# Patient Record
Sex: MALE | Race: BLACK OR AFRICAN AMERICAN | URBAN - METROPOLITAN AREA
[De-identification: names, ages, dates, MRNs, and addresses within clinical notes are randomized per-mention and may not be internally consistent; named-entity substitution may affect disease eponyms.]

---

## 2021-08-28 ENCOUNTER — TELEPHONE ENCOUNTER (OUTPATIENT)
Dept: URBAN - METROPOLITAN AREA CLINIC 13 | Facility: CLINIC | Age: 66
End: 2021-08-28

## 2021-08-29 ENCOUNTER — TELEPHONE ENCOUNTER (OUTPATIENT)
Dept: URBAN - METROPOLITAN AREA CLINIC 13 | Facility: CLINIC | Age: 66
End: 2021-08-29

## 2022-09-09 ENCOUNTER — HOSPITAL ENCOUNTER (INPATIENT)
Dept: HOSPITAL 5 - ED | Age: 67
LOS: 14 days | DRG: 853 | End: 2022-09-23
Attending: STUDENT IN AN ORGANIZED HEALTH CARE EDUCATION/TRAINING PROGRAM | Admitting: STUDENT IN AN ORGANIZED HEALTH CARE EDUCATION/TRAINING PROGRAM
Payer: MEDICARE

## 2022-09-09 DIAGNOSIS — G93.41: ICD-10-CM

## 2022-09-09 DIAGNOSIS — N17.9: ICD-10-CM

## 2022-09-09 DIAGNOSIS — A04.72: ICD-10-CM

## 2022-09-09 DIAGNOSIS — E11.52: ICD-10-CM

## 2022-09-09 DIAGNOSIS — A41.9: Primary | ICD-10-CM

## 2022-09-09 DIAGNOSIS — I25.10: ICD-10-CM

## 2022-09-09 DIAGNOSIS — I12.9: ICD-10-CM

## 2022-09-09 DIAGNOSIS — J96.21: ICD-10-CM

## 2022-09-09 DIAGNOSIS — D50.9: ICD-10-CM

## 2022-09-09 DIAGNOSIS — E87.6: ICD-10-CM

## 2022-09-09 DIAGNOSIS — E11.621: ICD-10-CM

## 2022-09-09 DIAGNOSIS — I96: ICD-10-CM

## 2022-09-09 DIAGNOSIS — E11.22: ICD-10-CM

## 2022-09-09 DIAGNOSIS — I46.9: ICD-10-CM

## 2022-09-09 DIAGNOSIS — L97.529: ICD-10-CM

## 2022-09-09 DIAGNOSIS — N18.9: ICD-10-CM

## 2022-09-09 DIAGNOSIS — Z20.822: ICD-10-CM

## 2022-09-09 DIAGNOSIS — M86.8X7: ICD-10-CM

## 2022-09-09 DIAGNOSIS — E44.0: ICD-10-CM

## 2022-09-09 DIAGNOSIS — D69.6: ICD-10-CM

## 2022-09-09 DIAGNOSIS — E11.65: ICD-10-CM

## 2022-09-09 PROCEDURE — 84300 ASSAY OF URINE SODIUM: CPT

## 2022-09-09 PROCEDURE — 85027 COMPLETE CBC AUTOMATED: CPT

## 2022-09-09 PROCEDURE — 99285 EMERGENCY DEPT VISIT HI MDM: CPT

## 2022-09-09 PROCEDURE — 83036 HEMOGLOBIN GLYCOSYLATED A1C: CPT

## 2022-09-09 PROCEDURE — 81001 URINALYSIS AUTO W/SCOPE: CPT

## 2022-09-09 PROCEDURE — U0003 INFECTIOUS AGENT DETECTION BY NUCLEIC ACID (DNA OR RNA); SEVERE ACUTE RESPIRATORY SYNDROME CORONAVIRUS 2 (SARS-COV-2) (CORONAVIRUS DISEASE [COVID-19]), AMPLIFIED PROBE TECHNIQUE, MAKING USE OF HIGH THROUGHPUT TECHNOLOGIES AS DESCRIBED BY CMS-2020-01-R: HCPCS

## 2022-09-09 PROCEDURE — 71045 X-RAY EXAM CHEST 1 VIEW: CPT

## 2022-09-09 PROCEDURE — 84100 ASSAY OF PHOSPHORUS: CPT

## 2022-09-09 PROCEDURE — 82607 VITAMIN B-12: CPT

## 2022-09-09 PROCEDURE — 83880 ASSAY OF NATRIURETIC PEPTIDE: CPT

## 2022-09-09 PROCEDURE — 80053 COMPREHEN METABOLIC PANEL: CPT

## 2022-09-09 PROCEDURE — C1725 CATH, TRANSLUMIN NON-LASER: HCPCS

## 2022-09-09 PROCEDURE — 94003 VENT MGMT INPAT SUBQ DAY: CPT

## 2022-09-09 PROCEDURE — 76700 US EXAM ABDOM COMPLETE: CPT

## 2022-09-09 PROCEDURE — 37225: CPT

## 2022-09-09 PROCEDURE — 96375 TX/PRO/DX INJ NEW DRUG ADDON: CPT

## 2022-09-09 PROCEDURE — 86038 ANTINUCLEAR ANTIBODIES: CPT

## 2022-09-09 PROCEDURE — 85018 HEMOGLOBIN: CPT

## 2022-09-09 PROCEDURE — C9113 INJ PANTOPRAZOLE SODIUM, VIA: HCPCS

## 2022-09-09 PROCEDURE — 85007 BL SMEAR W/DIFF WBC COUNT: CPT

## 2022-09-09 PROCEDURE — 82550 ASSAY OF CK (CPK): CPT

## 2022-09-09 PROCEDURE — 87070 CULTURE OTHR SPECIMN AEROBIC: CPT

## 2022-09-09 PROCEDURE — 87040 BLOOD CULTURE FOR BACTERIA: CPT

## 2022-09-09 PROCEDURE — A9575 INJ GADOTERATE MEGLUMI 0.1ML: HCPCS

## 2022-09-09 PROCEDURE — 87205 SMEAR GRAM STAIN: CPT

## 2022-09-09 PROCEDURE — 93306 TTE W/DOPPLER COMPLETE: CPT

## 2022-09-09 PROCEDURE — 82803 BLOOD GASES ANY COMBINATION: CPT

## 2022-09-09 PROCEDURE — 37220: CPT

## 2022-09-09 PROCEDURE — 83935 ASSAY OF URINE OSMOLALITY: CPT

## 2022-09-09 PROCEDURE — C1769 GUIDE WIRE: HCPCS

## 2022-09-09 PROCEDURE — 84156 ASSAY OF PROTEIN URINE: CPT

## 2022-09-09 PROCEDURE — 85610 PROTHROMBIN TIME: CPT

## 2022-09-09 PROCEDURE — 82140 ASSAY OF AMMONIA: CPT

## 2022-09-09 PROCEDURE — 94002 VENT MGMT INPAT INIT DAY: CPT

## 2022-09-09 PROCEDURE — 86021 WBC ANTIBODY IDENTIFICATION: CPT

## 2022-09-09 PROCEDURE — P9035 PLATELET PHERES LEUKOREDUCED: HCPCS

## 2022-09-09 PROCEDURE — 96367 TX/PROPH/DG ADDL SEQ IV INF: CPT

## 2022-09-09 PROCEDURE — 80048 BASIC METABOLIC PNL TOTAL CA: CPT

## 2022-09-09 PROCEDURE — 76937 US GUIDE VASCULAR ACCESS: CPT

## 2022-09-09 PROCEDURE — 88305 TISSUE EXAM BY PATHOLOGIST: CPT

## 2022-09-09 PROCEDURE — 80061 LIPID PANEL: CPT

## 2022-09-09 PROCEDURE — 85025 COMPLETE CBC W/AUTO DIFF WBC: CPT

## 2022-09-09 PROCEDURE — 74018 RADEX ABDOMEN 1 VIEW: CPT

## 2022-09-09 PROCEDURE — 36415 COLL VENOUS BLD VENIPUNCTURE: CPT

## 2022-09-09 PROCEDURE — 83010 ASSAY OF HAPTOGLOBIN QUANT: CPT

## 2022-09-09 PROCEDURE — 83735 ASSAY OF MAGNESIUM: CPT

## 2022-09-09 PROCEDURE — 76770 US EXAM ABDO BACK WALL COMP: CPT

## 2022-09-09 PROCEDURE — C1887 CATHETER, GUIDING: HCPCS

## 2022-09-09 PROCEDURE — 87075 CULTR BACTERIA EXCEPT BLOOD: CPT

## 2022-09-09 PROCEDURE — 86160 COMPLEMENT ANTIGEN: CPT

## 2022-09-09 PROCEDURE — 96365 THER/PROPH/DIAG IV INF INIT: CPT

## 2022-09-09 PROCEDURE — C2623 CATH, TRANSLUMIN, DRUG-COAT: HCPCS

## 2022-09-09 PROCEDURE — 86901 BLOOD TYPING SEROLOGIC RH(D): CPT

## 2022-09-09 PROCEDURE — C8929 TTE W OR WO FOL WCON,DOPPLER: HCPCS

## 2022-09-09 PROCEDURE — 96366 THER/PROPH/DIAG IV INF ADDON: CPT

## 2022-09-09 PROCEDURE — 36600 WITHDRAWAL OF ARTERIAL BLOOD: CPT

## 2022-09-09 PROCEDURE — 82570 ASSAY OF URINE CREATININE: CPT

## 2022-09-09 PROCEDURE — 83550 IRON BINDING TEST: CPT

## 2022-09-09 PROCEDURE — 82106 ALPHA-FETOPROTEIN AMNIOTIC: CPT

## 2022-09-09 PROCEDURE — 84133 ASSAY OF URINE POTASSIUM: CPT

## 2022-09-09 PROCEDURE — 88311 DECALCIFY TISSUE: CPT

## 2022-09-09 PROCEDURE — 70450 CT HEAD/BRAIN W/O DYE: CPT

## 2022-09-09 PROCEDURE — 86022 PLATELET ANTIBODIES: CPT

## 2022-09-09 PROCEDURE — 86850 RBC ANTIBODY SCREEN: CPT

## 2022-09-09 PROCEDURE — C1760 CLOSURE DEV, VASC: HCPCS

## 2022-09-09 PROCEDURE — 82728 ASSAY OF FERRITIN: CPT

## 2022-09-09 PROCEDURE — 84165 PROTEIN E-PHORESIS SERUM: CPT

## 2022-09-09 PROCEDURE — 87116 MYCOBACTERIA CULTURE: CPT

## 2022-09-09 PROCEDURE — 85384 FIBRINOGEN ACTIVITY: CPT

## 2022-09-09 PROCEDURE — 82962 GLUCOSE BLOOD TEST: CPT

## 2022-09-09 PROCEDURE — 85014 HEMATOCRIT: CPT

## 2022-09-09 PROCEDURE — C1714 CATH, TRANS ATHERECTOMY, DIR: HCPCS

## 2022-09-09 PROCEDURE — 86900 BLOOD TYPING SEROLOGIC ABO: CPT

## 2022-09-09 PROCEDURE — 94760 N-INVAS EAR/PLS OXIMETRY 1: CPT

## 2022-09-09 PROCEDURE — 86920 COMPATIBILITY TEST SPIN: CPT

## 2022-09-09 PROCEDURE — C1884 EMBOLIZATION PROTECT SYST: HCPCS

## 2022-09-09 PROCEDURE — 90732 PPSV23 VACC 2 YRS+ SUBQ/IM: CPT

## 2022-09-09 PROCEDURE — 94660 CPAP INITIATION&MGMT: CPT

## 2022-09-09 PROCEDURE — P9016 RBC LEUKOCYTES REDUCED: HCPCS

## 2022-09-10 LAB
%HYPO/RBC NFR BLD AUTO: (no result) %
ALBUMIN SERPL-MCNC: 3.6 G/DL (ref 3.9–5)
ALT SERPL-CCNC: 28 UNITS/L (ref 7–56)
ANISOCYTOSIS BLD QL SMEAR: (no result)
BAND NEUTROPHILS # (MANUAL): 0.5 K/MM3
BUN SERPL-MCNC: 57 MG/DL (ref 9–20)
BUN/CREAT SERPL: 34 %
CALCIUM SERPL-MCNC: 8.9 MG/DL (ref 8.4–10.2)
HCT VFR BLD CALC: 25.5 % (ref 35.5–45.6)
HDLC SERPL-MCNC: 37 MG/DL (ref 40–59)
HEMOLYSIS INDEX: 17
HGB BLD-MCNC: 8.2 GM/DL (ref 11.8–15.2)
MACROCYTES BLD QL SMEAR: (no result)
MCHC RBC AUTO-ENTMCNC: 32 % (ref 32–34)
MCV RBC AUTO: 101 FL (ref 84–94)
MYELOCYTES # (MANUAL): 0 K/MM3
PLATELET # BLD: 31 K/MM3 (ref 140–440)
PROMYELOCYTES # (MANUAL): 0 K/MM3
RBC # BLD AUTO: 2.52 M/MM3 (ref 3.65–5.03)
TOTAL CELLS COUNTED BLD: 100

## 2022-09-10 RX ADMIN — Medication SCH ML: at 11:12

## 2022-09-10 RX ADMIN — POVIDONE-IODINE SCH APPLIC: 100 OINTMENT TOPICAL at 16:27

## 2022-09-10 RX ADMIN — HEPARIN SODIUM SCH UNIT: 5000 INJECTION, SOLUTION INTRAVENOUS; SUBCUTANEOUS at 21:44

## 2022-09-10 RX ADMIN — INSULIN HUMAN SCH UNITS: 100 INJECTION, SOLUTION PARENTERAL at 21:46

## 2022-09-10 RX ADMIN — LINEZOLID SCH MLS/HR: 600 INJECTION, SOLUTION INTRAVENOUS at 16:27

## 2022-09-10 RX ADMIN — OXYCODONE AND ACETAMINOPHEN PRN TAB: 5; 325 TABLET ORAL at 15:36

## 2022-09-10 RX ADMIN — CEFEPIME SCH MLS/HR: 1 INJECTION, POWDER, FOR SOLUTION INTRAMUSCULAR; INTRAVENOUS at 16:27

## 2022-09-10 RX ADMIN — SODIUM CHLORIDE SCH MLS/HR: 0.9 INJECTION, SOLUTION INTRAVENOUS at 15:10

## 2022-09-10 RX ADMIN — INSULIN HUMAN SCH UNITS: 100 INJECTION, SOLUTION PARENTERAL at 21:44

## 2022-09-10 RX ADMIN — HEPARIN SODIUM SCH UNIT: 5000 INJECTION, SOLUTION INTRAVENOUS; SUBCUTANEOUS at 15:10

## 2022-09-10 RX ADMIN — CEFEPIME SCH MLS/HR: 1 INJECTION, POWDER, FOR SOLUTION INTRAMUSCULAR; INTRAVENOUS at 09:10

## 2022-09-10 RX ADMIN — Medication SCH ML: at 21:47

## 2022-09-10 RX ADMIN — INSULIN HUMAN SCH UNITS: 100 INJECTION, SOLUTION PARENTERAL at 09:57

## 2022-09-10 RX ADMIN — INSULIN HUMAN SCH UNITS: 100 INJECTION, SOLUTION PARENTERAL at 16:24

## 2022-09-10 NOTE — XRAY REPORT
LEFT FOOT 3 VIEWS.



INDICATION / CLINICAL INFORMATION: r/o osteo 



COMPARISON: None available.

 

FINDINGS:

Small bubbles of gas are present within the soft tissues of the forefoot adjacent the third through f
ifth toes. Osteolysis of the second toe present. The third toe demonstrates overlying small bubbles o
f gas versus osteopenia and possible changes of early acute osteomyelitis. Moderate hallux valgus def
ormity present.



IMPRESSION:

1. Soft tissue gas suggestive of gas producing organism and infection as detailed.

2. Early acute osteomyelitis involving third toe not excluded. MRI recommended for further evaluation
.



Signer Name: Alessandro Chung II, MD 

Signed: 9/10/2022 2:43 AM

Workstation Name: [id number]

## 2022-09-10 NOTE — CONSULTATION
History of Present Illness





- Reason for Consult


Consult date: 09/10/22


3rd toe infection


Requesting physician: NORTH GALLEGOS





- History of Present Illness


67-year-old male with a past medical history of diabetes, CKD, prior bypass of 

the RLE 10+ years ago, stenting of the LLE from outside facility many years ago 

who presents to the emergency room with worsening left foot infection.  The 

patient states he has had an ulcer of the third toe of his left foot for the 

last 1 month.  He regularly sees a podiatrist who referred him to vascular 

surgery.  The patient has undergone vascular work-up including ultrasound and 

diagnostic angiogram by myself.  He was being scheduled for a revascularization 

procedure.  





The patient was also started on doxycycline and instructed to apply Betadine to 

the wound daily for wound care.  The patient states that over the last 2 to 3 

days his ulcer became rapidly worse and became infected.  He is noted a stronger

odor from the area and more drainage.  There is mild swelling of his foot.  In 

the emergency room work-up included lab work and a x-ray of the left foot.  

Patient was noted to have a leukocytosis greater than 20 and blood sugar greater

than 500.  X-ray revealed possible osteomyelitis.





Vascular consulted for evaluation.  Patient has nonpalpable pedal pulses with a 

open left third digit PIP joint with subluxation of the joint and soft tissue 

necrosis of the distal and mid phalanx.  The toe has a foul odor.  Betadine 

applied.





Discussed with patient that plan will be for revascularization on Monday, and 

third digit amputation on Tuesday.  May benefit from hyperbaric oxygen as 

outpatient.





Contacted infectious disease to substitute vancomycin for linezolid to avoid JASMEET

on CKD.  Dr. Pineda agreed with plan.





Past History


Past Medical History: diabetes, PVD (s/p RLE bypass 10+ years ago, occluded 

(pop-dp) ; s/p LLE SFA/pop stenting at outside facility years ago), renal 

failure (CKD)


Past Surgical History: Other (PVD surgery)


Social history: no significant social history


Family history: no significant family history





Medications and Allergies


                                    Allergies











Allergy/AdvReac Type Severity Reaction Status Date / Time


 


No Known Allergies Allergy   Verified 09/10/22 05:35











Active Meds: 


Active Medications





Acetaminophen (Acetaminophen 325 Mg Tab)  650 mg PO Q4H PRN


   PRN Reason: Pain MILD(1-3)/Fever >100.5/HA


Dextrose (Dextrose 50% In Water (25gm) 50 Ml Syringe)  50 ml IV Q30MIN PRN; 

Protocol


   PRN Reason: Hypoglycemia


Heparin Sodium (Porcine) (Heparin 5,000 Unit/1 Ml Vial)  5,000 unit SUB-Q Q8HR 

ELVIE


Sodium Chloride (Nacl 0.9% 1000 Ml)  1,000 mls @ 100 mls/hr IV AS DIRECT ELVIE


Cefepime HCl (Cefepime/Ns 1 Gm/100 Ml)  1 gm in 100 mls @ 200 mls/hr IV Q8H ELVIE;

Protocol


   Last Admin: 09/10/22 09:10 Dose:  200 mls/hr


   


Lactated Ringer's (Lactated Ringers)  1,000 mls @ 999 mls/hr IV BOLUS ONE


   Stop: 09/10/22 14:12


Insulin Human Isoph/Insulin Regular (Insulin Nph/Regular 70/30 Inj)  15 unit 

SUB-Q BIDDIAB ELVIE


Insulin Human Regular (Insulin Regular, Human 100 Units/1 Ml)  0 units SUB-Q Q6H

ELVIE; Protocol


   Last Admin: 09/10/22 09:57 Dose:  8 units


   


Morphine Sulfate (Morphine 4 Mg/1 Ml Inj)  2 mg IV Q4H PRN


   PRN Reason: Pain , Severe (7-10)


Ondansetron HCl (Ondansetron 4 Mg/2 Ml Inj)  4 mg IV Q8H PRN


   PRN Reason: Nausea And Vomiting


Oxycodone/Acetaminophen (Oxycodone /Acetaminophen 5-325mg Tab)  1 tab PO Q6H PRN


   PRN Reason: Pain, Moderate (4-6)


Sodium Chloride (Sodium Chloride 0.9% 10 Ml Flush Syringe)  10 ml IV BID [facility]


   Last Admin: 09/10/22 11:12 Dose:  10 ml


   


Sodium Chloride (Sodium Chloride 0.9% 10 Ml Flush Syringe)  10 ml IV PRN PRN


   PRN Reason: LINE FLUSH











Review of Systems


All systems: negative (see HPI)





Exam





- Constitutional


Vitals: 


                                        











Temp Pulse Resp BP Pulse Ox


 


 99.0 F   109 H  16   124/49   99 


 


 09/09/22 21:14  09/09/22 21:14  09/09/22 21:14  09/09/22 21:14  09/09/22 21:14











General appearance: Present: no acute distress





- EENT


Eyes: Present: EOM intact


ENT: hearing intact





- Neck


Neck: Present: supple





- Respiratory


Respiratory effort: normal





- Extremities


Extremities: normal temperature, normal color


Extremity abnormal: pulses diminished, other (Left third digit as described in 

HPI)





- Abdominal


General gastrointestinal: Present: soft, non-tender





- Psychiatric


Psychiatric: appropriate mood/affect, cooperative





Results





- Labs


CBC & Chem 7: 


                                 09/10/22 02:26





                                 09/10/22 02:26


Labs: 


                              Abnormal lab results











  09/10/22 09/10/22 09/10/22 Range/Units





  02:26 02:26 08:41 


 


WBC  27.0 H    (4.5-11.0)  K/mm3


 


RBC  2.52 L    (3.65-5.03)  M/mm3


 


Hgb  8.2 L    (11.8-15.2)  gm/dl


 


Hct  25.5 L    (35.5-45.6)  %


 


MCV  101 H    (84-94)  fl


 


MCH  33 H    (28-32)  pg


 


RDW  13.1 L    (13.2-15.2)  %


 


Plt Count  31 L    (140-440)  K/mm3


 


Seg Neuts % (Manual)  88.0 H    (40.0-70.0)  %


 


Lymphocytes % (Manual)  7.0 L    (13.4-35.0)  %


 


Seg Neutrophils # Man  23.8 H    (1.8-7.7)  K/mm3


 


Sodium   129 L   (137-145)  mmol/L


 


Chloride   91.5 L   ()  mmol/L


 


Carbon Dioxide   16 L   (22-30)  mmol/L


 


BUN   57 H   (9-20)  mg/dL


 


Creatinine   1.7 H   (0.8-1.3)  mg/dL


 


Glucose   509 H*   ()  mg/dL


 


POC Glucose     ()  mg/dL


 


Hemoglobin A1c    6.5 H  (4-6)  %


 


AST   41 H   (5-40)  units/L


 


Albumin   3.6 L   (3.9-5)  g/dL


 


LDL Cholesterol Direct     ()  mg/dL


 


HDL Cholesterol     (40-59)  mg/dL














  09/10/22 09/10/22 09/10/22 Range/Units





  08:41 09:19 11:22 


 


WBC     (4.5-11.0)  K/mm3


 


RBC     (3.65-5.03)  M/mm3


 


Hgb     (11.8-15.2)  gm/dl


 


Hct     (35.5-45.6)  %


 


MCV     (84-94)  fl


 


MCH     (28-32)  pg


 


RDW     (13.2-15.2)  %


 


Plt Count     (140-440)  K/mm3


 


Seg Neuts % (Manual)     (40.0-70.0)  %


 


Lymphocytes % (Manual)     (13.4-35.0)  %


 


Seg Neutrophils # Man     (1.8-7.7)  K/mm3


 


Sodium     (137-145)  mmol/L


 


Chloride     ()  mmol/L


 


Carbon Dioxide     (22-30)  mmol/L


 


BUN     (9-20)  mg/dL


 


Creatinine     (0.8-1.3)  mg/dL


 


Glucose     ()  mg/dL


 


POC Glucose   394 H  343 H  ()  mg/dL


 


Hemoglobin A1c     (4-6)  %


 


AST     (5-40)  units/L


 


Albumin     (3.9-5)  g/dL


 


LDL Cholesterol Direct  46 L    ()  mg/dL


 


HDL Cholesterol  37 L    (40-59)  mg/dL














Assessment and Plan





67-year-old male with past medical history of diabetes, PVD, right TMA, left thi

rd digit ulceration who presents with wet gangrene of the left third digit 

distal phalanx and mid phalanx.  There is an open left PIP.





There is a septic joint left third digit PIP with distal and mid phalanx 

necrosis and foul odor compatible with wet gangrene.





Patient has nonpalpable pedal pulses.





Plan for revascularization on Monday.  Plan for amputation on Tuesday.





Contact infectious disease for antibiotic management and changing vancomycin to 

linezolid.  Appreciate assistance.





Continue aspirin, cilostazol, and will start Plavix.





N.p.o. after midnight on Monday for revascularization.





Recommend continued IV hydration in preparation for procedure.

## 2022-09-10 NOTE — CONSULTATION
History of Present Illness


Consult date: 09/10/22


Reason for consult: wound care


Chief complaint: 





Left third toe open wound





- History of present illness


History of present illness: 





67-year-old male with a past medical history of diabetes who presents to the 

emergency room with worsening left foot infection.  The patient states he has 

had an infection of the third toe of his left foot for the last 1 month.  He 

regularly sees a podiatrist who referred him to vascular surgery.  The patient 

has undergone vascular work-up including ultrasound and diagnostic angiogram 

with Dr. Wells.  He was being scheduled for a revascularization procedure.  The 

patient was also started on doxycycline and instructed to apply Betadine to the 

wound daily for wound care.  The patient states that over the last 2 to 3 days 

his infection has gotten worse.  He is noted a stronger odor from the area and 

more drainage.  There is mild swelling of his foot.  In the emergency room work-

up included lab work and a x-ray of the left foot.  Patient was noted to have a 

leukocytosis greater than 20 and blood sugar greater than 500.  X-ray revealed 

possible osteomyelitis.





Past History


Past Medical History: diabetes, hypertension


Past Surgical History: Other (Right lower extremity arterial bypass, right 

transmetatarsal amputation)


Social history: no significant social history


Family history: no significant family history





Medications and Allergies


                                    Allergies











Allergy/AdvReac Type Severity Reaction Status Date / Time


 


No Known Allergies Allergy   Verified 09/10/22 05:35











Active Meds: 


Active Medications





Acetaminophen (Acetaminophen 325 Mg Tab)  650 mg PO Q4H PRN


   PRN Reason: Pain MILD(1-3)/Fever >100.5/HA


Dextrose (Dextrose 50% In Water (25gm) 50 Ml Syringe)  50 ml IV Q30MIN PRN; Pr

otocol


   PRN Reason: Hypoglycemia


Heparin Sodium (Porcine) (Heparin 5,000 Unit/1 Ml Vial)  5,000 unit SUB-Q Q8HR 

ELVIE


Sodium Chloride (Nacl 0.9% 1000 Ml)  1,000 mls @ 100 mls/hr IV AS DIRECT [facility]


Cefepime HCl (Cefepime/Ns 1 Gm/100 Ml)  1 gm in 100 mls @ 200 mls/hr IV Q8H EL[facility];

Protocol


   Last Admin: 09/10/22 09:10 Dose:  200 mls/hr


   


Insulin Human Isoph/Insulin Regular (Insulin Nph/Regular 70/30 Inj)  15 unit 

SUB-Q BIDDIAB ELVIE


Insulin Human Regular (Insulin Regular, Human 100 Units/1 Ml)  0 units SUB-Q Q6H

[facility]; Protocol


   Last Admin: 09/10/22 09:57 Dose:  8 units


   


Morphine Sulfate (Morphine 4 Mg/1 Ml Inj)  2 mg IV Q4H PRN


   PRN Reason: Pain , Severe (7-10)


Ondansetron HCl (Ondansetron 4 Mg/2 Ml Inj)  4 mg IV Q8H PRN


   PRN Reason: Nausea And Vomiting


Oxycodone/Acetaminophen (Oxycodone /Acetaminophen 5-325mg Tab)  1 tab PO Q6H PRN


   PRN Reason: Pain, Moderate (4-6)


Sodium Chloride (Sodium Chloride 0.9% 10 Ml Flush Syringe)  10 ml IV BID [facility]


   Last Admin: 09/10/22 11:12 Dose:  10 ml


   


Sodium Chloride (Sodium Chloride 0.9% 10 Ml Flush Syringe)  10 ml IV PRN PRN


   PRN Reason: LINE FLUSH











Review of Systems


All systems: negative (10 point ROS performed and negative except for that 

listed in HPI)





Exam


                                   Vital Signs











Temp Pulse Resp BP Pulse Ox


 


 99.0 F   109 H  16   124/49   99 


 


 09/09/22 21:14  09/09/22 21:14  09/09/22 21:14  09/09/22 21:14  09/09/22 21:14











Narrative exam: 





Gen.: Awake, alert, oriented x3.  No apparent distress


ENT: Trachea midline.  No lymphadenopathy.  No scleral icterus or conjunctival 

pallor


CV: S1, S2 present


Respiratory: No audible wheezes


Abdomen: Soft, nondistended, nontender.  No rebound, rigidity, guarding


Extremities: Previous right TMA.  Left foot is warm.  There is mild edema and 

erythema of the distal forefoot.  Distal pulses are not palpable.  There is an 

open wound of the third toe with necrotic tissue present and bone exposed.  The 

wound was cleansed and Betadine applied.  The wound was wrapped with 4 x 4 gauze

and secured with a Kerlix.





Results





- Labs





                                 09/10/22 02:26





                                 09/10/22 02:26


                              Abnormal lab results











  09/10/22 09/10/22 09/10/22 Range/Units





  02:26 02:26 08:41 


 


WBC  27.0 H    (4.5-11.0)  K/mm3


 


RBC  2.52 L    (3.65-5.03)  M/mm3


 


Hgb  8.2 L    (11.8-15.2)  gm/dl


 


Hct  25.5 L    (35.5-45.6)  %


 


MCV  101 H    (84-94)  fl


 


MCH  33 H    (28-32)  pg


 


RDW  13.1 L    (13.2-15.2)  %


 


Plt Count  31 L    (140-440)  K/mm3


 


Seg Neuts % (Manual)  88.0 H    (40.0-70.0)  %


 


Lymphocytes % (Manual)  7.0 L    (13.4-35.0)  %


 


Seg Neutrophils # Man  23.8 H    (1.8-7.7)  K/mm3


 


Sodium   129 L   (137-145)  mmol/L


 


Chloride   91.5 L   ()  mmol/L


 


Carbon Dioxide   16 L   (22-30)  mmol/L


 


BUN   57 H   (9-20)  mg/dL


 


Creatinine   1.7 H   (0.8-1.3)  mg/dL


 


Glucose   509 H*   ()  mg/dL


 


POC Glucose     ()  mg/dL


 


Hemoglobin A1c    6.5 H  (4-6)  %


 


AST   41 H   (5-40)  units/L


 


Albumin   3.6 L   (3.9-5)  g/dL


 


LDL Cholesterol Direct     ()  mg/dL


 


HDL Cholesterol     (40-59)  mg/dL














  09/10/22 09/10/22 09/10/22 Range/Units





  08:41 09:19 11:22 


 


WBC     (4.5-11.0)  K/mm3


 


RBC     (3.65-5.03)  M/mm3


 


Hgb     (11.8-15.2)  gm/dl


 


Hct     (35.5-45.6)  %


 


MCV     (84-94)  fl


 


MCH     (28-32)  pg


 


RDW     (13.2-15.2)  %


 


Plt Count     (140-440)  K/mm3


 


Seg Neuts % (Manual)     (40.0-70.0)  %


 


Lymphocytes % (Manual)     (13.4-35.0)  %


 


Seg Neutrophils # Man     (1.8-7.7)  K/mm3


 


Sodium     (137-145)  mmol/L


 


Chloride     ()  mmol/L


 


Carbon Dioxide     (22-30)  mmol/L


 


BUN     (9-20)  mg/dL


 


Creatinine     (0.8-1.3)  mg/dL


 


Glucose     ()  mg/dL


 


POC Glucose   394 H  343 H  ()  mg/dL


 


Hemoglobin A1c     (4-6)  %


 


AST     (5-40)  units/L


 


Albumin     (3.9-5)  g/dL


 


LDL Cholesterol Direct  46 L    ()  mg/dL


 


HDL Cholesterol  37 L    (40-59)  mg/dL








                                 Diabetes panel











  09/10/22 09/10/22 09/10/22 Range/Units





  02:26 08:41 08:41 


 


Sodium  129 L    (137-145)  mmol/L


 


Potassium  4.8    (3.6-5.0)  mmol/L


 


Chloride  91.5 L    ()  mmol/L


 


Carbon Dioxide  16 L    (22-30)  mmol/L


 


BUN  57 H    (9-20)  mg/dL


 


Creatinine  1.7 H    (0.8-1.3)  mg/dL


 


Glucose  509 H*    ()  mg/dL


 


Hemoglobin A1c   6.5 H   (4-6)  %


 


Calcium  8.9    (8.4-10.2)  mg/dL


 


AST  41 H    (5-40)  units/L


 


ALT  28    (7-56)  units/L


 


Alkaline Phosphatase  129    ()  units/L


 


Total Protein  6.9    (6.3-8.2)  g/dL


 


Albumin  3.6 L    (3.9-5)  g/dL


 


Triglycerides    114  (2-149)  mg/dL


 


HDL Cholesterol    37 L  (40-59)  mg/dL








                                  Calcium panel











  09/10/22 Range/Units





  02:26 


 


Calcium  8.9  (8.4-10.2)  mg/dL


 


Albumin  3.6 L  (3.9-5)  g/dL








                                 Pituitary panel











  09/10/22 Range/Units





  02:26 


 


Sodium  129 L  (137-145)  mmol/L


 


Potassium  4.8  (3.6-5.0)  mmol/L


 


Chloride  91.5 L  ()  mmol/L


 


Carbon Dioxide  16 L  (22-30)  mmol/L


 


BUN  57 H  (9-20)  mg/dL


 


Creatinine  1.7 H  (0.8-1.3)  mg/dL


 


Glucose  509 H*  ()  mg/dL


 


Calcium  8.9  (8.4-10.2)  mg/dL








                                  Adrenal panel











  09/10/22 Range/Units





  02:26 


 


Sodium  129 L  (137-145)  mmol/L


 


Potassium  4.8  (3.6-5.0)  mmol/L


 


Chloride  91.5 L  ()  mmol/L


 


Carbon Dioxide  16 L  (22-30)  mmol/L


 


BUN  57 H  (9-20)  mg/dL


 


Creatinine  1.7 H  (0.8-1.3)  mg/dL


 


Glucose  509 H*  ()  mg/dL


 


Calcium  8.9  (8.4-10.2)  mg/dL


 


Total Bilirubin  0.70  (0.1-1.2)  mg/dL


 


AST  41 H  (5-40)  units/L


 


ALT  28  (7-56)  units/L


 


Alkaline Phosphatase  129  ()  units/L


 


Total Protein  6.9  (6.3-8.2)  g/dL


 


Albumin  3.6 L  (3.9-5)  g/dL














- Imaging


Additional studies: 





X-ray left foot





Assessment and Plan





67-year-old male with





1.  Infected diabetic foot wound left third toe


2.  Osteomyelitis


3.  Peripheral arterial disease





Plan:


1. Consistent carb diet


2. strict glucose control


3. gentle IVF


4. IV abx


5. Daily wound care


6. Vascular surgery consulted - d/w Dr. Wells. Pt to be scheduled for revasc on 

Monday


7. Obtain MRI L foot 


8.  Patient at the very least will need an amputation of his left third toe.  We

will await results from revascularization procedure and MRI left foot to 

determine extent of amputation.


9.  Patient will be a candidate for hyperbaric oxygen therapy as an outpatient. 

Will obtain chest x-ray as Pre-HBOT workup





Plan was discussed in detail with the patient and he acknowledged understanding.

 All questions answered.


Thank you for this consultation. Please call with any questions or concerns.

## 2022-09-10 NOTE — HISTORY AND PHYSICAL REPORT
History of Present Illness


Date of examination: 09/10/22


Date of admission: 


09/10/22 08:30





Chief complaint: 





Infected left diabetic foot





Past History


Past Medical History: diabetes, PVD (s/p RLE bypass 10+ years ago, occluded 

(pop-dp) ; s/p LLE SFA/pop stenting at outside facility years ago), renal 

failure (CKD)


Past Surgical History: Other (PVD surgery; transmetatarsal amputation of right 

foot)


Social history: no significant social history


Family history: no significant family history





Medications and Allergies


                                    Allergies











Allergy/AdvReac Type Severity Reaction Status Date / Time


 


No Known Allergies Allergy   Verified 09/10/22 05:35











Active Meds: 


Active Medications





Acetaminophen (Acetaminophen 325 Mg Tab)  650 mg PO Q4H PRN


   PRN Reason: Pain MILD(1-3)/Fever >100.5/HA


Dextrose (Dextrose 50% In Water (25gm) 50 Ml Syringe)  50 ml IV Q30MIN PRN; 

Protocol


   PRN Reason: Hypoglycemia


Heparin Sodium (Porcine) (Heparin 5,000 Unit/1 Ml Vial)  5,000 unit SUB-Q Q8HR 

ELVIE


   Last Admin: 09/10/22 15:10 Dose:  5,000 unit


   


Sodium Chloride (Nacl 0.9% 1000 Ml)  1,000 mls @ 100 mls/hr IV AS DIRECT ELVIE


   Last Admin: 09/10/22 15:10 Dose:  100 mls/hr


   


Cefepime HCl (Cefepime/Ns 1 Gm/100 Ml)  1 gm in 100 mls @ 200 mls/hr IV Q8H ELVIE;

Protocol


   Last Admin: 09/10/22 16:27 Dose:  200 mls/hr


   


Linezolid (Zyvox 600mg/300ml)  600 mg in 300 mls @ 300 mls/hr IV Q12H ELVIE; 

Protocol


   Last Admin: 09/10/22 16:27 Dose:  300 mls/hr


   


Insulin Human Isoph/Insulin Regular (Insulin Nph/Regular 70/30 Inj)  25 unit 

SUB-Q BIDDIAB ELVIE


Insulin Human Regular (Insulin Regular, Human 100 Units/1 Ml)  0 units SUB-Q Q6H

ELVIE; Protocol


   Last Admin: 09/10/22 16:24 Dose:  8 units


   


Insulin Human Regular (Insulin Regular, Human 100 Units/1 Ml)  10 units SUB-Q 

ONCE PRN


   PRN Reason: Hyperglycemia


   Last Admin: 09/10/22 18:29 Dose:  10 units


   


Insulin Human Regular (Insulin Regular, Human 100 Units/1 Ml)  5 units SUB-Q 

[facility]


Morphine Sulfate (Morphine 4 Mg/1 Ml Inj)  2 mg IV Q4H PRN


   PRN Reason: Pain , Severe (7-10)


Ondansetron HCl (Ondansetron 4 Mg/2 Ml Inj)  4 mg IV Q8H PRN


   PRN Reason: Nausea And Vomiting


Oxycodone/Acetaminophen (Oxycodone /Acetaminophen 5-325mg Tab)  1 tab PO Q6H PRN


   PRN Reason: Pain, Moderate (4-6)


   Last Admin: 09/10/22 15:36 Dose:  1 tab


   


Pneumococcal Polyvalent Vaccine (Pneumococcal 23 Valent 0.5 Ml Vial)  0.5 ml IM 

.ONCE ONE


   Stop: 09/11/22 12:01


Povidone Iodine (Povidone-Iodine Ointment 28.35 Gm)  1 applic TP DAILY [facility]


   Last Admin: 09/10/22 16:27 Dose:  1 applic


   


Sodium Chloride (Sodium Chloride 0.9% 10 Ml Flush Syringe)  10 ml IV BID [facility]


   Last Admin: 09/10/22 11:12 Dose:  10 ml


   


Sodium Chloride (Sodium Chloride 0.9% 10 Ml Flush Syringe)  10 ml IV PRN PRN


   PRN Reason: LINE FLUSH











Review of Systems


All systems: negative


Constitutional: fever, chills, malaise


Gastrointestinal: nausea, diarrhea





Exam





- Constitutional


Vitals: 


                                        











Temp Pulse Resp BP Pulse Ox


 


 97.8 F   109 H  18   141/61   99 


 


 09/10/22 14:16  09/10/22 14:16  09/10/22 14:16  09/10/22 14:16  09/09/22 21:14











General appearance: Present: no acute distress, well-nourished, malodorous 

(Malodorous wet gangrene of left foot with ulceration of third digit)





- EENT


Eyes: Present: PERRL, EOM intact


ENT: hearing intact, clear oral mucosa, dentition normal





- Neck


Neck: Present: supple, normal ROM





- Respiratory


Respiratory effort: normal


Respiratory: bilateral: CTA





- Cardiovascular


Rhythm: regular


Heart Sounds: Present: S1 & S2





- Extremities


Extremities: no ischemia, pulses symmetrical, No edema, normal temperature, 

abnormal (Transmetatarsal amputation of right foot)


Extremity abnormal: pulses diminished (Nonpalpable pedal pulses)


Peripheral Pulses: within normal limits





- Abdominal


General gastrointestinal: Present: soft, non-tender, non-distended, normal bowel

sounds


Male genitourinary: Present: deferred





- Rectal


Rectal Exam: deferred





- Integumentary


Integumentary: Present: clear, warm, dry





- Musculoskeletal


Musculoskeletal: strength equal bilaterally





- Psychiatric


Psychiatric: appropriate mood/affect, cooperative





- Neurologic


Neurologic: CNII-XII intact, moves all extremities





- Allied Health


Allied health notes reviewed: nursing





Results





- Labs


CBC & Chem 7: 


                                 09/10/22 02:26





                                 09/10/22 02:26


Labs: 


                             Laboratory Last Values











WBC  27.0 K/mm3 (4.5-11.0)  H  09/10/22  02:26    


 


RBC  2.52 M/mm3 (3.65-5.03)  L  09/10/22  02:26    


 


Hgb  8.2 gm/dl (11.8-15.2)  L  09/10/22  02:26    


 


Hct  25.5 % (35.5-45.6)  L  09/10/22  02:26    


 


MCV  101 fl (84-94)  H  09/10/22  02:26    


 


MCH  33 pg (28-32)  H  09/10/22  02:26    


 


MCHC  32 % (32-34)   09/10/22  02:26    


 


RDW  13.1 % (13.2-15.2)  L  09/10/22  02:26    


 


Plt Count  31 K/mm3 (140-440)  L  09/10/22  02:26    


 


Add Manual Diff  Complete   09/10/22  02:26    


 


Total Counted  100   09/10/22  02:26    


 


Seg Neuts % (Manual)  88.0 % (40.0-70.0)  H  09/10/22  02:26    


 


Band Neutrophils %  2.0 %  09/10/22  02:26    


 


Lymphocytes % (Manual)  7.0 % (13.4-35.0)  L  09/10/22  02:26    


 


Reactive Lymphs % (Man)  0 %  09/10/22  02:26    


 


Monocytes % (Manual)  3.0 % (0.0-7.3)   09/10/22  02:26    


 


Eosinophils % (Manual)  0 % (0.0-4.3)   09/10/22  02:26    


 


Basophils % (Manual)  0 % (0.0-1.8)   09/10/22  02:26    


 


Metamyelocytes %  0 %  09/10/22  02:26    


 


Myelocytes %  0 %  09/10/22  02:26    


 


Promyelocytes %  0 %  09/10/22  02:26    


 


Blast Cells %  0 %  09/10/22  02:26    


 


Nucleated RBC %  Not Reportable   09/10/22  02:26    


 


Seg Neutrophils # Man  23.8 K/mm3 (1.8-7.7)  H  09/10/22  02:26    


 


Band Neutrophils #  0.5 K/mm3  09/10/22  02:26    


 


Lymphocytes # (Manual)  1.9 K/mm3 (1.2-5.4)   09/10/22  02:26    


 


Abs React Lymphs (Man)  0.0 K/mm3  09/10/22  02:26    


 


Monocytes # (Manual)  0.8 K/mm3 (0.0-0.8)   09/10/22  02:26    


 


Eosinophils # (Manual)  0.0 K/mm3 (0.0-0.4)   09/10/22  02:26    


 


Basophils # (Manual)  0.0 K/mm3 (0.0-0.1)   09/10/22  02:26    


 


Metamyelocytes #  0.0 K/mm3  09/10/22  02:26    


 


Myelocytes #  0.0 K/mm3  09/10/22  02:26    


 


Promyelocytes #  0.0 K/mm3  09/10/22  02:26    


 


Blast Cells #  0.0 K/mm3  09/10/22  02:26    


 


WBC Morphology  Not Reportable   09/10/22  02:26    


 


Hypersegmented Neuts  Not Reportable   09/10/22  02:26    


 


Hyposegmented Neuts  Not Reportable   09/10/22  02:26    


 


Hypogranular Neuts  Not Reportable   09/10/22  02:26    


 


Smudge Cells  Not Reportable   09/10/22  02:26    


 


Toxic Granulation  Not Reportable   09/10/22  02:26    


 


Toxic Vacuolation  Not Reportable   09/10/22  02:26    


 


Dohle Bodies  Not Reportable   09/10/22  02:26    


 


Pelger-Huet Anomaly  Not Reportable   09/10/22  02:26    


 


Olive Rods  Not Reportable   09/10/22  02:26    


 


Platelet Estimate  Appears decreased   09/10/22  02:26    


 


Clumped Platelets  Few   09/10/22  02:26    


 


Plt Clumps, EDTA  Not Reportable   09/10/22  02:26    


 


Large Platelets  Not Reportable   09/10/22  02:26    


 


Giant Platelets  Not Reportable   09/10/22  02:26    


 


Platelet Satelliting  Not Reportable   09/10/22  02:26    


 


Plt Morphology Comment  Not Reportable   09/10/22  02:26    


 


RBC Morphology  Not Reportable   09/10/22  02:26    


 


Dimorphic RBCs  Not Reportable   09/10/22  02:26    


 


Polychromasia  Not Reportable   09/10/22  02:26    


 


Hypochromasia  2+   09/10/22  02:26    


 


Poikilocytosis  Not Reportable   09/10/22  02:26    


 


Anisocytosis  1+   09/10/22  02:26    


 


Microcytosis  Not Reportable   09/10/22  02:26    


 


Macrocytosis  1+   09/10/22  02:26    


 


Spherocytes  Not Reportable   09/10/22  02:26    


 


Pappenheimer Bodies  Not Reportable   09/10/22  02:26    


 


Sickle Cells  Not Reportable   09/10/22  02:26    


 


Target Cells  Not Reportable   09/10/22  02:26    


 


Tear Drop Cells  Not Reportable   09/10/22  02:26    


 


Ovalocytes  Few   09/10/22  02:26    


 


Helmet Cells  Not Reportable   09/10/22  02:26    


 


[place] Bodies  Not Reportable   09/10/22  02:26    


 


Cabot Rings  Not Reportable   09/10/22  02:26    


 


[place] Cells  Not Reportable   09/10/22  02:26    


 


Bite Cells  Not Reportable   09/10/22  02:26    


 


Crenated Cell  Not Reportable   09/10/22  02:26    


 


Elliptocytes  Not Reportable   09/10/22  02:26    


 


Acanthocytes (Spur)  Not Reportable   09/10/22  02:26    


 


Rouleaux  Not Reportable   09/10/22  02:26    


 


Hemoglobin C Crystals  Not Reportable   09/10/22  02:26    


 


Schistocytes  Not Reportable   09/10/22  02:26    


 


Malaria parasites  Not Reportable   09/10/22  02:26    


 


Tej Bodies  Not Reportable   09/10/22  02:26    


 


Hem Pathologist Commnt  No   09/10/22  02:26    


 


Sodium  129 mmol/L (137-145)  L  09/10/22  02:26    


 


Potassium  4.8 mmol/L (3.6-5.0)   09/10/22  02:26    


 


Chloride  91.5 mmol/L ()  L  09/10/22  02:26    


 


Carbon Dioxide  16 mmol/L (22-30)  L  09/10/22  02:26    


 


Anion Gap  26 mmol/L  09/10/22  02:26    


 


BUN  57 mg/dL (9-20)  H  09/10/22  02:26    


 


Creatinine  1.7 mg/dL (0.8-1.3)  H  09/10/22  02:26    


 


Estimated GFR  40 ml/min  09/10/22  02:26    


 


BUN/Creatinine Ratio  34 %  09/10/22  02:26    


 


Glucose  509 mg/dL ()  H*  09/10/22  02:26    


 


POC Glucose  464 mg/dL ()  H  09/10/22  18:16    


 


Hemoglobin A1c  6.5 % (4-6)  H  09/10/22  08:41    


 


Lactic Acid  1.50 mmol/L (0.7-2.0)   09/10/22  02:26    


 


Calcium  8.9 mg/dL (8.4-10.2)   09/10/22  02:26    


 


Total Bilirubin  0.70 mg/dL (0.1-1.2)   09/10/22  02:26    


 


AST  41 units/L (5-40)  H  09/10/22  02:26    


 


ALT  28 units/L (7-56)   09/10/22  02:26    


 


Alkaline Phosphatase  129 units/L ()   09/10/22  02:26    


 


Total Protein  6.9 g/dL (6.3-8.2)   09/10/22  02:26    


 


Albumin  3.6 g/dL (3.9-5)  L  09/10/22  02:26    


 


Albumin/Globulin Ratio  1.1 %  09/10/22  02:26    


 


Triglycerides  114 mg/dL (2-149)   09/10/22  08:41    


 


Cholesterol  116 mg/dL ()   09/10/22  08:41    


 


LDL Cholesterol Direct  46 mg/dL ()  L  09/10/22  08:41    


 


HDL Cholesterol  37 mg/dL (40-59)  L  09/10/22  08:41    


 


Cholesterol/HDL Ratio  3.13 %  09/10/22  08:41    











Microbiology: 


Microbiology





09/10/22 02:26   Peripheral/Venous   Blood Culture - Preliminary


                            Culture in Progress


09/10/22 02:26   Peripheral/Venous   Blood Culture - Preliminary


                            Culture in Progress








Toney/IV: 


                                        





Voiding Method                   Urinal











Assessment and Plan


Assessment and plan: 





#Wet gangrene of left third distal phalanx and middle phalanx


#Diabetic foot infection with ulceration of left third digit


#Osteomyelitis of left foot


#Peripheral arterial disease of bilateral lower extremities


Visualized on x-ray of left foot and CT of left lower extremity. Pending MRI 

left foot


WBC 27


Continue cefepime 1 g every 8 hours and IV linezolid 600 mg every 12 hours.  

Status post aggressive IV fluid resuscitation.


Infectious disease consulted; pending recs


Vascular surgery consulted; appreciate recs.  Planning for revascularization on

9/12/2022.


General surgery consulted; appreciate recs.  Plan for possible amputation on 

9/13/2022.


Continue to monitor





#Insulin dependent type II diabetes mellitus with hyperglycemia


Worsened by diabetic foot infection


- hemoglobin A1c: 6.5


- home regimen: Unknown


- current regimen: NPH 25 units twice daily + moderate SSI +8 units regular 

insulin with meals


- blood glucose goal 140-180 while inpatient


- continue to monitor





#Pseudohyponatremia


Sodium 129 (corrected 136)





#JASMEET on likely CKD stage III


Creatinine 1.7 (baseline unknown)


Status post aggressive IV fluid resuscitation


Renally dose meds and avoid nephrotoxic drugs.


Monitor with repeat BMP tomorrow.  Consider nephrology consult showed 

creatinine 1 continue to increase.





#Normocytic anemia


#Likely anemia of chronic disease


Hemoglobin 8.2


Pending iron studies; transfuse if hemoglobin <7.





#Chronic thrombocytopenia


Platelets 31


Patient endorses being aware but not having etiology determined by other 

clinicians.


Transfusing 2 jumbo platelets tomorrow for pending vascularization on 

9/12/2022.





#Moderate protein caloric malnutrition


Albumin 3.6


Will initiate dietary supplementation once hyperglycemia is under control.





#Advanced care planning


-Disease education conducted, care plan discussed, diagnoses discussed, 

prognosis discussed, and patient acknowledges understanding with care plan


-Time: +30 min


Advance Directives: No


VTE prophylaxis?: Chemical


Plan of care discussed with patient/family: Yes

## 2022-09-10 NOTE — EMERGENCY DEPARTMENT REPORT
ED General Adult HPI





- General


Chief complaint: Extremity Injury, Lower


Stated complaint: INFECTED TOE


Time Seen by Provider: 09/10/22 01:56


Source: patient


Mode of arrival: Ambulatory


Limitations: No Limitations





- History of Present Illness


Initial comments: 


Patient 67-year-old male with history of diabetes and diabetic right third toe 

presents for drainage pain to same.  Patient is followed by vascular surgery.  

However states symptoms have worsened over the past 2 weeks.  Patient not 

currently on antibiotic therapy.  Is not currently on wound therapy.  Patient 

states yellow-greenish drainage.  Patient denies fevers chills or rigors.  

Patient remains amatory.  Diabetic shoes.  Patient is followed by [facility]

vascular surgery Dr. Wells.  Patient denies fevers chills nausea vomiting or 

other symptoms at this time.








- Related Data


                                    Allergies











Allergy/AdvReac Type Severity Reaction Status Date / Time


 


No Known Allergies Allergy   Verified 09/10/22 05:35














ED Review of Systems


ROS: 


Stated complaint: INFECTED TOE


Other details as noted in HPI





Constitutional: denies: chills, fever


Eyes: denies: eye pain, eye discharge, vision change


ENT: denies: ear pain, throat pain


Respiratory: denies: cough, shortness of breath, wheezing


Cardiovascular: denies: chest pain, palpitations


Endocrine: no symptoms reported


Gastrointestinal: denies: abdominal pain, nausea, diarrhea


Genitourinary: denies: urgency, dysuria


Musculoskeletal: other (Diabetic right foot wound)


Skin: other (Right foot third toe wound).  denies: rash, lesions


Neurological: denies: headache, weakness, paresthesias


Psychiatric: denies: anxiety, depression


Hematological/Lymphatic: denies: easy bleeding, easy bruising





ED Physical Exam





- General


Limitations: No Limitations


General appearance: alert, in no apparent distress





- Head


Head exam: Present: normocephalic, normal inspection





- Eye


Eye exam: Present: normal appearance, EOMI


Pupils: Present: normal accommodation





- ENT


ENT exam: Present: mucous membranes moist





- Neck


Neck exam: Present: normal inspection, full ROM.  Absent: tenderness





- Respiratory


Respiratory exam: Present: normal lung sounds bilaterally.  Absent: respiratory 

distress, wheezes





- Cardiovascular


Cardiovascular Exam: Present: regular rate, normal rhythm, normal heart sounds. 

Absent: systolic murmur, diastolic murmur, rubs, gallop





- GI/Abdominal


GI/Abdominal exam: Present: soft, normal bowel sounds





- Rectal


Rectal exam: Present: deferred





- Extremities Exam


Extremities exam: Present: full ROM





- Expanded Lower Extremity Exam


  ** Right


Foot/Toe exam: Present: tenderness, swelling, erythema (Third toe ulcer with 

drainage).  Absent: ecchymosis, crepidus


Neuro vascular tendon exam: Absent: pulse deficit, motor deficit, sensory 

deficit, tendon deficit


Gait: Positive: observed and limited by pain





- Back Exam


Back exam: Present: normal inspection, full ROM.  Absent: tenderness





- Neurological Exam


Neurological exam: Present: alert, oriented X3, CN II-XII intact, motor sensory 

deficit, reflexes normal





- Expanded Neurological Exam


  ** Expanded


Patient oriented to: Present: person, place, time


Speech: Present: fluid speech


Motor strength exam: RLE: 5, LLE: 5


Best Eye Response ([place]): (4) open spontaneously


Best Motor Response ([place]): (6) obeys commands


Best Verbal Response ([place]): (5) oriented


[place] Total: 15





- Psychiatric


Psychiatric exam: Present: normal affect, normal mood





- Skin


Skin exam: Present: warm, dry, normal color, other (Diabetic right foot ulcer). 

Absent: rash





ED Course


                                   Vital Signs











  09/09/22





  21:14


 


Temperature 99.0 F


 


Pulse Rate 109 H


 


Respiratory 16





Rate 


 


Blood Pressure 124/49


 


O2 Sat by Pulse 99





Oximetry 














ED Medical Decision Making





- Lab Data


Result diagrams: 


                                 09/10/22 02:26





                                 09/10/22 02:26








Labs











  09/10/22 09/10/22 09/10/22





  02:26 02:26 02:26


 


WBC  27.0 H  


 


RBC  2.52 L  


 


Hgb  8.2 L  


 


Hct  25.5 L  


 


MCV  101 H  


 


MCH  33 H  


 


MCHC  32  


 


RDW  13.1 L  


 


Plt Count  31 L  


 


Add Manual Diff  Complete  


 


Total Counted  100  


 


Seg Neuts % (Manual)  88.0 H  


 


Band Neutrophils %  2.0  


 


Lymphocytes % (Manual)  7.0 L  


 


Reactive Lymphs % (Man)  0  


 


Monocytes % (Manual)  3.0  


 


Eosinophils % (Manual)  0  


 


Basophils % (Manual)  0  


 


Metamyelocytes %  0  


 


Myelocytes %  0  


 


Promyelocytes %  0  


 


Blast Cells %  0  


 


Nucleated RBC %  Not Reportable  


 


Seg Neutrophils # Man  23.8 H  


 


Band Neutrophils #  0.5  


 


Lymphocytes # (Manual)  1.9  


 


Abs React Lymphs (Man)  0.0  


 


Monocytes # (Manual)  0.8  


 


Eosinophils # (Manual)  0.0  


 


Basophils # (Manual)  0.0  


 


Metamyelocytes #  0.0  


 


Myelocytes #  0.0  


 


Promyelocytes #  0.0  


 


Blast Cells #  0.0  


 


WBC Morphology  Not Reportable  


 


Hypersegmented Neuts  Not Reportable  


 


Hyposegmented Neuts  Not Reportable  


 


Hypogranular Neuts  Not Reportable  


 


Smudge Cells  Not Reportable  


 


Toxic Granulation  Not Reportable  


 


Toxic Vacuolation  Not Reportable  


 


Dohle Bodies  Not Reportable  


 


Pelger-Huet Anomaly  Not Reportable  


 


Olive Rods  Not Reportable  


 


Platelet Estimate  Appears decreased  


 


Clumped Platelets  Few  


 


Plt Clumps, EDTA  Not Reportable  


 


Large Platelets  Not Reportable  


 


Giant Platelets  Not Reportable  


 


Platelet Satelliting  Not Reportable  


 


Plt Morphology Comment  Not Reportable  


 


RBC Morphology  Not Reportable  


 


Dimorphic RBCs  Not Reportable  


 


Polychromasia  Not Reportable  


 


Hypochromasia  2+  


 


Poikilocytosis  Not Reportable  


 


Anisocytosis  1+  


 


Microcytosis  Not Reportable  


 


Macrocytosis  1+  


 


Spherocytes  Not Reportable  


 


Pappenheimer Bodies  Not Reportable  


 


Sickle Cells  Not Reportable  


 


Target Cells  Not Reportable  


 


Tear Drop Cells  Not Reportable  


 


Ovalocytes  Few  


 


Helmet Cells  Not Reportable  


 


Soler-[place] Bodies  Not Reportable  


 


Cabot Rings  Not Reportable  


 


Jamison Cells  Not Reportable  


 


Bite Cells  Not Reportable  


 


Crenated Cell  Not Reportable  


 


Elliptocytes  Not Reportable  


 


Acanthocytes (Spur)  Not Reportable  


 


Rouleaux  Not Reportable  


 


Hemoglobin C Crystals  Not Reportable  


 


Schistocytes  Not Reportable  


 


Malaria parasites  Not Reportable  


 


Tej Bodies  Not Reportable  


 


Hem Pathologist Commnt  No  


 


Sodium   129 L 


 


Potassium   4.8 


 


Chloride   91.5 L 


 


Carbon Dioxide   16 L 


 


Anion Gap   26 


 


BUN   57 H 


 


Creatinine   1.7 H 


 


Estimated GFR   40 


 


BUN/Creatinine Ratio   34 


 


Glucose   509 H* 


 


Lactic Acid    1.50


 


Calcium   8.9 


 


Total Bilirubin   0.70 


 


AST   41 H 


 


ALT   28 


 


Alkaline Phosphatase   129 


 


Total Protein   6.9 


 


Albumin   3.6 L 


 


Albumin/Globulin Ratio   1.1 














- Radiology Data


Radiology results: report reviewed, image reviewed


LEFT FOOT 3 VIEWS.  


 


 INDICATION / CLINICAL INFORMATION: r/o osteo   


 


 COMPARISON: None available.  


 


 FINDINGS:  


 Small bubbles of gas are present within the soft tissues of the forefoot 

adjacent the third through


fifth toes. Osteolysis of the second toe present. The third toe demonstrates 

overlying small bubbles


of gas versus osteopenia and possible changes of early acute osteomyelitis. 

Moderate hallux valgus 


deformity present.  


 


 IMPRESSION:  


 1. Soft tissue gas suggestive of gas producing organism and infection as 

detailed.  


 2. Early acute osteomyelitis involving third toe not excluded. MRI recommended 

for further 


evaluation.  


 


 Signer Name: Katarina Khan II, MD   


 Signed: 9/10/2022 2:43 AM  


 Workstation Name: [id number]   


 


 


Transcribed By: MJ  


Dictated By: KATARINA KHAN II, MD  


Electronically Authenticated By: KATARINA KHAN II, MD    


Signed Date/Time: 09/10/22 0243                                


 


 


 


DD/DT: 09/10/22 0241                                                            

 


TD/TT:











- Medical Decision Making


Consulted vascular surgery Dr. Wells for diagnosis osteomyelitis left third and 

second toe, recommendation,: Consult general surgery, admit to hospitalist, 

diagnosis osteomyelitis left foot.  Second and third toe.  Discussed same with 

hospitalist hospitalist agrees with treatment plan.  Have discussed same with 

patient patient agrees with treatment plan.  Patient is currently alert oriented

x3 with no acute distress.  Patient will be admitted to inpatient diagnosis 

osteomyelitis left foot.





Gen Surgery consult completed, Dr. Miller, agrees with tx plan, Dx Osteomyelitis

left foot, admit to hospitalists, Consult General Surgery.  Pt admitted to 

hospitalist at this time, pt verbalizes consent and understanding of treatments 

plan. 





Critical care attestation.: 


If time is entered above; I have spent that time in minutes in the direct care 

of this critically ill patient, excluding procedure time.








ED Disposition


Clinical Impression: 


Foot osteomyelitis, left


Qualifiers:


 Osteomyelitis type: other acute Qualified Code(s): M86.172 - Other acute 

osteomyelitis, left ankle and foot





Disposition: 09 ADMITTED AS INPATIENT


Is pt being admited?: Yes


Does the pt Need Aspirin: No


Condition: Stable


Time of Disposition: 05:21

## 2022-09-10 NOTE — XRAY REPORT
CHEST 1 VIEW 9/10/2022 2:35 PM



INDICATION / CLINICAL INFORMATION: hyperbaric oxygen therapy w/u.



COMPARISON: None available.



FINDINGS:



SUPPORT DEVICES: None.

HEART / MEDIASTINUM: No significant abnormality. 

LUNGS / PLEURA: Mild atelectasis in the left lower lung. No pneumothorax. 



ADDITIONAL FINDINGS: No significant additional findings.



IMPRESSION:

1. No acute findings.



Signer Name: Vamsi Monsalve MD 

Signed: 9/10/2022 2:47 PM

Workstation Name: [email or web address]-

## 2022-09-11 LAB
ANISOCYTOSIS BLD QL SMEAR: (no result)
BAND NEUTROPHILS # (MANUAL): 0 K/MM3
BUN SERPL-MCNC: 29 MG/DL (ref 9–20)
BUN/CREAT SERPL: 32 %
CALCIUM SERPL-MCNC: 8.5 MG/DL (ref 8.4–10.2)
HCT VFR BLD CALC: 25.3 % (ref 35.5–45.6)
HEMOLYSIS INDEX: 40
HGB BLD-MCNC: 7.9 GM/DL (ref 11.8–15.2)
IRON SERPL-MCNC: 16 UG/DL (ref 49–181)
MACROCYTES BLD QL SMEAR: (no result)
MCHC RBC AUTO-ENTMCNC: 31 % (ref 32–34)
MCV RBC AUTO: 102 FL (ref 84–94)
MYELOCYTES # (MANUAL): 0 K/MM3
PLATELET # BLD: 27 K/MM3 (ref 140–440)
PROMYELOCYTES # (MANUAL): 0 K/MM3
RBC # BLD AUTO: 2.49 M/MM3 (ref 3.65–5.03)
TIBC SERPL-MCNC: 133 MCG/DL (ref 250–450)
TOTAL CELLS COUNTED BLD: 200

## 2022-09-11 PROCEDURE — 30233R1 TRANSFUSION OF NONAUTOLOGOUS PLATELETS INTO PERIPHERAL VEIN, PERCUTANEOUS APPROACH: ICD-10-PCS | Performed by: STUDENT IN AN ORGANIZED HEALTH CARE EDUCATION/TRAINING PROGRAM

## 2022-09-11 RX ADMIN — INSULIN HUMAN SCH UNITS: 100 INJECTION, SOLUTION PARENTERAL at 17:18

## 2022-09-11 RX ADMIN — NIFEDIPINE SCH MG: 30 TABLET, FILM COATED, EXTENDED RELEASE ORAL at 09:50

## 2022-09-11 RX ADMIN — INSULIN HUMAN SCH UNITS: 100 INJECTION, SOLUTION PARENTERAL at 09:00

## 2022-09-11 RX ADMIN — OXYCODONE AND ACETAMINOPHEN PRN TAB: 5; 325 TABLET ORAL at 09:49

## 2022-09-11 RX ADMIN — Medication SCH ML: at 13:53

## 2022-09-11 RX ADMIN — CEFEPIME SCH MLS/HR: 1 INJECTION, POWDER, FOR SOLUTION INTRAMUSCULAR; INTRAVENOUS at 13:35

## 2022-09-11 RX ADMIN — INSULIN HUMAN SCH UNITS: 100 INJECTION, SOLUTION PARENTERAL at 13:52

## 2022-09-11 RX ADMIN — INSULIN HUMAN SCH: 100 INJECTION, SOLUTION PARENTERAL at 06:00

## 2022-09-11 RX ADMIN — LINEZOLID SCH MLS/HR: 600 INJECTION, SOLUTION INTRAVENOUS at 03:19

## 2022-09-11 RX ADMIN — HEPARIN SODIUM SCH UNIT: 5000 INJECTION, SOLUTION INTRAVENOUS; SUBCUTANEOUS at 15:20

## 2022-09-11 RX ADMIN — INSULIN HUMAN SCH UNIT: 100 INJECTION, SUSPENSION SUBCUTANEOUS at 17:42

## 2022-09-11 RX ADMIN — POVIDONE-IODINE SCH APPLIC: 100 OINTMENT TOPICAL at 13:53

## 2022-09-11 RX ADMIN — HEPARIN SODIUM SCH UNIT: 5000 INJECTION, SOLUTION INTRAVENOUS; SUBCUTANEOUS at 22:03

## 2022-09-11 RX ADMIN — INSULIN HUMAN SCH: 100 INJECTION, SOLUTION PARENTERAL at 23:18

## 2022-09-11 RX ADMIN — INSULIN HUMAN SCH UNITS: 100 INJECTION, SOLUTION PARENTERAL at 13:55

## 2022-09-11 RX ADMIN — HEPARIN SODIUM SCH UNIT: 5000 INJECTION, SOLUTION INTRAVENOUS; SUBCUTANEOUS at 06:08

## 2022-09-11 RX ADMIN — INSULIN HUMAN SCH UNITS: 100 INJECTION, SOLUTION PARENTERAL at 06:08

## 2022-09-11 RX ADMIN — INSULIN HUMAN SCH: 100 INJECTION, SOLUTION PARENTERAL at 00:28

## 2022-09-11 RX ADMIN — Medication SCH ML: at 21:10

## 2022-09-11 RX ADMIN — CEFEPIME SCH MLS/HR: 1 INJECTION, POWDER, FOR SOLUTION INTRAMUSCULAR; INTRAVENOUS at 00:35

## 2022-09-11 RX ADMIN — SODIUM CHLORIDE SCH MLS/HR: 0.9 INJECTION, SOLUTION INTRAVENOUS at 06:16

## 2022-09-11 RX ADMIN — INSULIN HUMAN SCH UNIT: 100 INJECTION, SUSPENSION SUBCUTANEOUS at 09:52

## 2022-09-11 RX ADMIN — INSULIN HUMAN SCH UNITS: 100 INJECTION, SOLUTION PARENTERAL at 09:54

## 2022-09-11 RX ADMIN — LINEZOLID SCH MLS/HR: 600 INJECTION, SOLUTION INTRAVENOUS at 15:19

## 2022-09-11 RX ADMIN — OXYCODONE AND ACETAMINOPHEN PRN TAB: 5; 325 TABLET ORAL at 14:43

## 2022-09-11 RX ADMIN — OXYCODONE AND ACETAMINOPHEN PRN TAB: 5; 325 TABLET ORAL at 21:08

## 2022-09-11 RX ADMIN — OXYCODONE AND ACETAMINOPHEN PRN TAB: 5; 325 TABLET ORAL at 00:35

## 2022-09-11 NOTE — PROGRESS NOTE
Assessment and Plan


Assessment and plan: 





#Wet gangrene of left third distal phalanx and middle phalanx


#Diabetic foot infection with ulceration of left third digit


#Osteomyelitis of left foot


#Peripheral arterial disease of bilateral lower extremities


Visualized on x-ray of left foot and CT of left lower extremity. Pending MRI 

left foot


WBC 27-->21


Continue cefepime 1 g every 8 hours and IV linezolid 600 mg every 12 hours.  

Status post aggressive IV fluid resuscitation.


Infectious disease consulted; pending recs


Vascular surgery consulted; appreciate recs.  Planning for revascularization on

2022.


General surgery consulted; appreciate recs.  Plan for possible amputation on 

2022.


Continue to monitor





#Insulin dependent type II diabetes mellitus with hyperglycemiaimproving


Worsened by diabetic foot infection


- hemoglobin A1c: 6.5


- home regimen: Unknown


- current regimen: NPH 25 units twice daily + moderate SSI +8 units regular 

insulin with meals


- blood glucose goal 140-180 while inpatient


- continue to monitor





#Pseudohyponatremiaresolved


Sodium 129 (corrected 136)





#JASMEET on likely CKD stage IIIresolved


Creatinine 1.7-->0.9 (baseline unknown)


Status post aggressive IV fluid resuscitation


Renally dose meds and avoid nephrotoxic drugs.


Monitor with repeat BMP tomorrow.  Consider nephrology consult showed 

creatinine 1 continue to increase.





#Hypertension


- home medications: Unknown


- current medications: Started nifedipine 30 mg daily


- SBP goal <160 and DBP goal <90 while inpatient


- continue to monitor





#Iron deficiency anemia


#anemia of chronic disease


Hemoglobin 8.2


Pending iron studies; transfuse if hemoglobin <7.





#Chronic thrombocytopenia


Platelets 31-->27


Patient endorses being aware but not having etiology determined by other 

clinicians.


Transfusing 2 jumbo platelets today for pending vascularization on 2022.





#Moderate protein caloric malnutrition


Albumin 3.6


Will initiate dietary supplementation once hyperglycemia is under control.





#Advanced care planning


-Disease education conducted, care plan discussed, diagnoses discussed, 

prognosis discussed, and patient acknowledges understanding with care plan


-Time: +30 min


Disposition Plan: Continue medical management


Total Time Spent with Patient (Minutes): 45 minutes





History


Interval history: 





No acute events overnight.





Hospitalist Physical





- Constitutional


Vitals: 


                                        











Temp Pulse Resp BP Pulse Ox


 


 98.7 F   106 H  16   161/69   97 


 


 22 07:38  22 07:38  22 09:49  22 07:38  22 07:38











General appearance: Present: no acute distress, well-nourished, malodorous 

(Malodorous wet gangrene of left foot with ulceration of third digit)





- EENT


Eyes: Present: PERRL, EOM intact


ENT: hearing intact, clear oral mucosa, dentition normal





- Neck


Neck: Present: supple, normal ROM





- Respiratory


Respiratory effort: normal


Respiratory: bilateral: CTA





- Cardiovascular


Rhythm: regular


Heart Sounds: Present: S1 & S2





- Extremities


Extremities: no ischemia, pulses intact, No edema, normal temperature, normal 

color, abnormal (Transmetatarsal amputation of right foot; weight gangrenous 

ulcer of left foot)


Extremity abnormal: pulses diminished (At about milligrams a)


Peripheral Pulses: within normal limits





- Abdominal


General gastrointestinal: soft, non-tender, non-distended, normal bowel sounds





- Integumentary


Integumentary: Present: clear, warm, dry





- Psychiatric


Psychiatric: appropriate mood/affect, intact judgment & insight, memory intact, 

cooperative





- Neurologic


Neurologic: CNII-XII intact, moves all extremities





- Allied Health


Allied health notes reviewed: nursing





Results





- Labs


CBC & Chem 7: 


                                 22 05:35





                                 22 05:35


Labs: 


                             Laboratory Last Values











WBC  21.2 K/mm3 (4.5-11.0)  H  22  05:35    


 


RBC  2.49 M/mm3 (3.65-5.03)  L  22  05:35    


 


Hgb  7.9 gm/dl (11.8-15.2)  L  22  05:35    


 


Hct  25.3 % (35.5-45.6)  L  22  05:35    


 


MCV  102 fl (84-94)  H  22  05:35    


 


MCH  32 pg (28-32)   22  05:35    


 


MCHC  31 % (32-34)  L  22  05:35    


 


RDW  13.1 % (13.2-15.2)  L  22  05:35    


 


Plt Count  27 K/mm3 (140-440)  L  22  05:35    


 


Add Manual Diff  Complete   22  05:35    


 


Total Counted  200   22  05:35    


 


Seg Neuts % (Manual)  81.5 % (40.0-70.0)  H  22  05:35    


 


Band Neutrophils %  0 %  22  05:35    


 


Lymphocytes % (Manual)  12.0 % (13.4-35.0)  L  22  05:35    


 


Reactive Lymphs % (Man)  0 %  22  05:35    


 


Monocytes % (Manual)  4.5 % (0.0-7.3)   22  05:35    


 


Eosinophils % (Manual)  2.0 % (0.0-4.3)   22  05:35    


 


Basophils % (Manual)  0 % (0.0-1.8)   22  05:35    


 


Metamyelocytes %  0 %  22  05:35    


 


Myelocytes %  0 %  22  05:35    


 


Promyelocytes %  0 %  22  05:35    


 


Blast Cells %  0 %  22  05:35    


 


Nucleated RBC %  Not Reportable   22  05:35    


 


Seg Neutrophils # Man  17.3 K/mm3 (1.8-7.7)  H  22  05:35    


 


Band Neutrophils #  0.0 K/mm3  22  05:35    


 


Lymphocytes # (Manual)  2.5 K/mm3 (1.2-5.4)   22  05:35    


 


Abs React Lymphs (Man)  0.0 K/mm3  22  05:35    


 


Monocytes # (Manual)  1.0 K/mm3 (0.0-0.8)  H  22  05:35    


 


Eosinophils # (Manual)  0.4 K/mm3 (0.0-0.4)   22  05:35    


 


Basophils # (Manual)  0.0 K/mm3 (0.0-0.1)   22  05:35    


 


Metamyelocytes #  0.0 K/mm3  22  05:35    


 


Myelocytes #  0.0 K/mm3  22  05:35    


 


Promyelocytes #  0.0 K/mm3  22  05:35    


 


Blast Cells #  0.0 K/mm3  22  05:35    


 


WBC Morphology  Not Reportable   22  05:35    


 


Hypersegmented Neuts  Not Reportable   22  05:35    


 


Hyposegmented Neuts  Not Reportable   22  05:35    


 


Hypogranular Neuts  Not Reportable   22  05:35    


 


Smudge Cells  Not Reportable   22  05:35    


 


Toxic Granulation  Not Reportable   22  05:35    


 


Toxic Vacuolation  Not Reportable   22  05:35    


 


Dohle Bodies  Not Reportable   22  05:35    


 


Pelger-Huet Anomaly  Not Reportable   22  05:35    


 


Olive Rods  Not Reportable   22  05:35    


 


Platelet Estimate  Consistent w auto   22  05:35    


 


Clumped Platelets  Not Reportable   22  05:35    


 


Plt Clumps, EDTA  Not Reportable   22  05:35    


 


Large Platelets  Not Reportable   22  05:35    


 


Giant Platelets  Not Reportable   22  05:35    


 


Platelet Satelliting  Not Reportable   22  05:35    


 


Plt Morphology Comment  Not Reportable   22  05:35    


 


RBC Morphology  Not Reportable   22  05:35    


 


Dimorphic RBCs  Not Reportable   22  05:35    


 


Polychromasia  Not Reportable   22  05:35    


 


Hypochromasia  Not Reportable   22  05:35    


 


Poikilocytosis  Not Reportable   22  05:35    


 


Anisocytosis  1+   22  05:35    


 


Microcytosis  Not Reportable   22  05:35    


 


Macrocytosis  1+   22  05:35    


 


Spherocytes  Not Reportable   22  05:35    


 


Pappenheimer Bodies  Not Reportable   22  05:35    


 


Sickle Cells  Not Reportable   22  05:35    


 


Target Cells  Not Reportable   22  05:35    


 


Tear Drop Cells  Not Reportable   22  05:35    


 


Ovalocytes  Not Reportable   22  05:35    


 


Helmet Cells  Not Reportable   22  05:35    


 


Soler-Cantril Bodies  Not Reportable   22  05:35    


 


Cabot Rings  Not Reportable   22  05:35    


 


[place] Cells  Not Reportable   22  05:35    


 


Bite Cells  Not Reportable   22  05:35    


 


Crenated Cell  Not Reportable   22  05:35    


 


Elliptocytes  Not Reportable   22  05:35    


 


Acanthocytes (Spur)  Not Reportable   22  05:35    


 


Rouleaux  Not Reportable   22  05:35    


 


Hemoglobin C Crystals  Not Reportable   22  05:35    


 


Schistocytes  Not Reportable   22  05:35    


 


Malaria parasites  Not Reportable   22  05:35    


 


Tej Bodies  Not Reportable   22  05:35    


 


Hem Pathologist Commnt  No   22  05:35    


 


Sodium  134 mmol/L (137-145)  L  22  05:35    


 


Potassium  4.5 mmol/L (3.6-5.0)   22  05:35    


 


Chloride  101.0 mmol/L ()   22  05:35    


 


Carbon Dioxide  21 mmol/L (22-30)  L  22  05:35    


 


Anion Gap  17 mmol/L  22  05:35    


 


BUN  29 mg/dL (9-20)  H  22  05:35    


 


Creatinine  0.9 mg/dL (0.8-1.3)   22  05:35    


 


Estimated GFR  > 60 ml/min  22  05:35    


 


BUN/Creatinine Ratio  32 %  22  05:35    


 


Glucose  179 mg/dL ()  H  22  05:35    


 


POC Glucose  164 mg/dL ()  H  22  05:50    


 


Hemoglobin A1c  6.5 % (4-6)  H  09/10/22  08:41    


 


Lactic Acid  1.50 mmol/L (0.7-2.0)   09/10/22  02:26    


 


Calcium  8.5 mg/dL (8.4-10.2)   22  05:35    


 


Iron  16 ug/dL ()  L  22  05:35    


 


TIBC  133 mcg/dL (250-450)  L  22  05:35    


 


Ferritin  2888.0 ng/mL (30.0-300.0)  H  22  05:35    


 


Total Bilirubin  0.70 mg/dL (0.1-1.2)   09/10/22  02:26    


 


AST  41 units/L (5-40)  H  09/10/22  02:26    


 


ALT  28 units/L (7-56)   09/10/22  02:26    


 


Alkaline Phosphatase  129 units/L ()   09/10/22  02:26    


 


Total Protein  6.9 g/dL (6.3-8.2)   09/10/22  02:26    


 


Albumin  3.6 g/dL (3.9-5)  L  09/10/22  02:26    


 


Albumin/Globulin Ratio  1.1 %  09/10/22  02:26    


 


Triglycerides  114 mg/dL (2-149)   09/10/22  08:41    


 


Cholesterol  116 mg/dL ()   09/10/22  08:41    


 


LDL Cholesterol Direct  46 mg/dL ()  L  09/10/22  08:41    


 


HDL Cholesterol  37 mg/dL (40-59)  L  09/10/22  08:41    


 


Cholesterol/HDL Ratio  3.13 %  09/10/22  08:41    


 


Blood Type  O POSITIVE   22  Unknown











Microbiology: 


Microbiology





09/10/22 02:26   Peripheral/Venous   Blood Culture - Preliminary


                            NO GROWTH AFTER 24 HOURS


09/10/22 02:26   Peripheral/Venous   Blood Culture - Preliminary


                            NO GROWTH AFTER 24 HOURS








Toney/IV: 


                                        





Voiding Method                   Urinal











Active Medications





- Current Medications


Current Medications: 














Generic Name Dose Route Start Last Admin





  Trade Name Freq  PRN Reason Stop Dose Admin


 


Acetaminophen  650 mg  09/10/22 08:30 





  Acetaminophen 325 Mg Tab  PO  





  Q4H PRN  





  Pain MILD(1-3)/Fever >100.5/HA  


 


Atorvastatin Calcium  40 mg  22 22:00 





  Atorvastatin 40 Mg Tab  PO  





  QHS ELVIE  


 


Dextrose  50 ml  09/10/22 08:35 





  Dextrose 50% In Water (25gm) 50 Ml Syringe  IV  





  Q30MIN PRN  





  Hypoglycemia  





  Protocol  


 


Heparin Sodium (Porcine)  5,000 unit  09/10/22 14:00  22 06:08





  Heparin 5,000 Unit/1 Ml Vial  SUB-Q   5,000 unit





  Q8HR ELVIE   Administration


 


Sodium Chloride  1,000 mls @ 100 mls/hr  09/10/22 05:30  22 06:16





  Nacl 0.9% 1000 Ml  IV   100 mls/hr





  AS DIRECT ELVIE   Administration


 


Cefepime HCl  1 gm in 100 mls @ 200 mls/hr  09/10/22 09:00  22 00:35





  Cefepime/Ns 1 Gm/100 Ml  IV   200 mls/hr





  Q8H ELVIE   Administration





  Protocol  


 


Linezolid  600 mg in 300 mls @ 300 mls/hr  09/10/22 15:00  22 03:19





  Zyvox 600mg/300ml  IV   300 mls/hr





  Q12H ELVIE   Administration





  Protocol  


 


Insulin Human Isoph/Insulin Regular  25 unit  09/10/22 19:16  22 09:52





  Insulin Nph/Regular 70/30 Inj  SUB-Q   25 unit





  BIDDIAB ELVIE   Administration


 


Insulin Human Regular  0 units  09/10/22 09:00  22 09:54





  Insulin Regular, Human 100 Units/1 Ml  SUB-Q   2 units





  Q6H ELVIE   Administration





  Protocol  


 


Insulin Human Regular  10 units  09/10/22 17:38  09/10/22 18:29





  Insulin Regular, Human 100 Units/1 Ml  SUB-Q   10 units





  ONCE PRN   Administration





  Hyperglycemia  


 


Insulin Human Regular  5 units  09/10/22 22:00  09/10/22 21:44





  Insulin Regular, Human 100 Units/1 Ml  SUB-Q   5 units





  ACHS ELVIE   Administration


 


Insulin Human Regular  0 units  22 00:00  22 06:08





  Insulin Regular, Human 100 Units/1 Ml  SUB-Q   2 units





  Q6H ELVIE   Administration





  Protocol  


 


Morphine Sulfate  2 mg  09/10/22 08:30 





  Morphine 4 Mg/1 Ml Inj  IV  





  Q4H PRN  





  Pain , Severe (7-10)  


 


Nifedipine  30 mg  22 10:00  22 09:50





  Nifedipine Xl 30 Mg Tab  PO   30 mg





  QDAY ELVIE   Administration


 


Ondansetron HCl  4 mg  09/10/22 08:30 





  Ondansetron 4 Mg/2 Ml Inj  IV  





  Q8H PRN  





  Nausea And Vomiting  


 


Oxycodone/Acetaminophen  1 tab  09/10/22 08:30  22 09:49





  Oxycodone /Acetaminophen 5-325mg Tab  PO   1 tab





  Q6H PRN   Administration





  Pain, Moderate (4-6)  


 


Povidone Iodine  1 applic  09/10/22 15:00  09/10/22 16:27





  Povidone-Iodine Ointment 28.35 Gm  TP   1 applic





  DAILY ELVIE   Administration


 


Sodium Chloride  10 ml  09/10/22 10:00  09/10/22 21:47





  Sodium Chloride 0.9% 10 Ml Flush Syringe  IV   10 ml





  BID ELVIE   Administration


 


Sodium Chloride  10 ml  09/10/22 08:30 





  Sodium Chloride 0.9% 10 Ml Flush Syringe  IV  





  PRN PRN  





  LINE FLUSH  














Nutrition/Malnutrition Assess





- Dietary Evaluation


Nutrition/Malnutrition Findings: 


                                        





Nutrition Notes                                            Start:  22 

11:33


Freq:                                                      Status: Active       




Protocol:                                                                       




 Document     22 11:33  KERRIE  (Rec: 22 11:52  KERRIE  [id number])


 Nutrition Notes


     Need for Assessment generated from:         RN Referral


     Initial or Follow up                        Brief Note


     Current Diagnosis                           Diabetes


     Other Pertinent Diagnosis                   Infected (L) diabetic foot, (L


                                                 ) foot osteomyelitis


     Current Diet                                Consistent CHO


     Labs/Tests                                  BUN 29


                                                 


                                                 Iron 16


                                                 TIBC 133


                                                 A1C 6.5


     Pertinent Medications                       NS at 100ml/hr


     Height                                      5 ft 8 in


     Weight                                      72 kg


     Ideal Body Weight (kg)                      70.00


     BMI                                         24.1


     Weight Status                               Appropriate


     Subjective/Other Information                Pt screened for skin risk


                                                 assessment (Sid score: 18).


                                                 He consumed 50% of dinner


                                                 last pm; he is able to feed


                                                 himself.  Revascularization


                                                 procedure scheduled for


                                                 tomorrow; pt will be NPO after


                                                 midnight tonight.  PMHx


                                                 includes PVD (s/p RLE bypass


                                                 10+ yrs ago), (R) foot


                                                 transmetatarsal amputation.


     Burn                                        Absent


     Trauma                                      Absent


     Current % PO                                Fair (50-74%)


     Minimum of two criteria                     No


     Reduced  Strength                       Measurably Reduced (severe)


     Is patient on ventilator?                   No


     Is Patient Ambulatory and/or Out of Bed     No


     REE-([facility]-confined to bed)      [id number]


     Calculation Used for Recommendations        [facility]


     Additional Notes                            Pro needs 1.25-1.5g/k-


                                                 108g/day


                                                 Fluid needs 1ml/kcal


 Nutrition Intervention


     Follow-Up By:                               09/15/22


     Additional Comments                         F/U: intakes, need for ONS

## 2022-09-12 LAB
BAND NEUTROPHILS # (MANUAL): 0 K/MM3
BASOPHILS # (AUTO): 0.1 K/MM3 (ref 0–0.1)
BASOPHILS NFR BLD AUTO: 0.5 % (ref 0–1.8)
BUN SERPL-MCNC: 13 MG/DL (ref 9–20)
BUN/CREAT SERPL: 16 %
CALCIUM SERPL-MCNC: 8.6 MG/DL (ref 8.4–10.2)
EOSINOPHIL # BLD AUTO: 0.1 K/MM3 (ref 0–0.4)
EOSINOPHIL NFR BLD AUTO: 0.7 % (ref 0–4.3)
HCT VFR BLD CALC: 27.2 % (ref 35.5–45.6)
HCT VFR BLD CALC: 27.3 % (ref 35.5–45.6)
HEMOLYSIS INDEX: 0
HGB BLD-MCNC: 8.6 GM/DL (ref 11.8–15.2)
HGB BLD-MCNC: 8.8 GM/DL (ref 11.8–15.2)
INR PPP: 0.99 (ref 0.87–1.13)
LYMPHOCYTES # BLD AUTO: 2.9 K/MM3 (ref 1.2–5.4)
LYMPHOCYTES NFR BLD AUTO: 16.2 % (ref 13.4–35)
MCHC RBC AUTO-ENTMCNC: 31 % (ref 32–34)
MCHC RBC AUTO-ENTMCNC: 33 % (ref 32–34)
MCV RBC AUTO: 101 FL (ref 84–94)
MCV RBC AUTO: 101 FL (ref 84–94)
MONOCYTES # (AUTO): 2.1 K/MM3 (ref 0–0.8)
MONOCYTES % (AUTO): 11.9 % (ref 0–7.3)
MYELOCYTES # (MANUAL): 0 K/MM3
PLATELET # BLD: 44 K/MM3 (ref 140–440)
PLATELET # BLD: 47 K/MM3 (ref 140–440)
PROMYELOCYTES # (MANUAL): 0 K/MM3
RBC # BLD AUTO: 2.7 M/MM3 (ref 3.65–5.03)
RBC # BLD AUTO: 2.71 M/MM3 (ref 3.65–5.03)
TOTAL CELLS COUNTED BLD: 100

## 2022-09-12 PROCEDURE — 047D3Z1 DILATION OF LEFT COMMON ILIAC ARTERY USING DRUG-COATED BALLOON, PERCUTANEOUS APPROACH: ICD-10-PCS | Performed by: RADIOLOGY

## 2022-09-12 PROCEDURE — B41D1ZZ FLUOROSCOPY OF AORTA AND BILATERAL LOWER EXTREMITY ARTERIES USING LOW OSMOLAR CONTRAST: ICD-10-PCS | Performed by: RADIOLOGY

## 2022-09-12 PROCEDURE — 04CN3ZZ EXTIRPATION OF MATTER FROM LEFT POPLITEAL ARTERY, PERCUTANEOUS APPROACH: ICD-10-PCS | Performed by: RADIOLOGY

## 2022-09-12 PROCEDURE — 047N3Z1 DILATION OF LEFT POPLITEAL ARTERY USING DRUG-COATED BALLOON, PERCUTANEOUS APPROACH: ICD-10-PCS | Performed by: RADIOLOGY

## 2022-09-12 RX ADMIN — INSULIN HUMAN SCH: 100 INJECTION, SOLUTION PARENTERAL at 06:48

## 2022-09-12 RX ADMIN — INSULIN HUMAN SCH: 100 INJECTION, SUSPENSION SUBCUTANEOUS at 09:12

## 2022-09-12 RX ADMIN — INSULIN HUMAN SCH: 100 INJECTION, SOLUTION PARENTERAL at 18:20

## 2022-09-12 RX ADMIN — FENTANYL CITRATE ONE MCG: 50 INJECTION, SOLUTION INTRAMUSCULAR; INTRAVENOUS at 16:20

## 2022-09-12 RX ADMIN — POVIDONE-IODINE SCH APPLIC: 100 OINTMENT TOPICAL at 18:21

## 2022-09-12 RX ADMIN — FENTANYL CITRATE ONE MCG: 50 INJECTION, SOLUTION INTRAMUSCULAR; INTRAVENOUS at 16:00

## 2022-09-12 RX ADMIN — ACETAMINOPHEN PRN MG: 325 TABLET ORAL at 01:31

## 2022-09-12 RX ADMIN — INSULIN HUMAN SCH: 100 INJECTION, SOLUTION PARENTERAL at 18:23

## 2022-09-12 RX ADMIN — MIDAZOLAM ONE MG: 1 INJECTION INTRAMUSCULAR; INTRAVENOUS at 16:00

## 2022-09-12 RX ADMIN — CEFEPIME SCH MLS/HR: 1 INJECTION, POWDER, FOR SOLUTION INTRAMUSCULAR; INTRAVENOUS at 18:57

## 2022-09-12 RX ADMIN — METOCLOPRAMIDE PRN MG: 5 INJECTION, SOLUTION INTRAMUSCULAR; INTRAVENOUS at 22:50

## 2022-09-12 RX ADMIN — CILOSTAZOL SCH: 100 TABLET ORAL at 18:25

## 2022-09-12 RX ADMIN — HEPARIN SODIUM ONE UNIT: 1000 INJECTION, SOLUTION INTRAVENOUS; SUBCUTANEOUS at 16:20

## 2022-09-12 RX ADMIN — HEPARIN SODIUM ONE UNIT: 1000 INJECTION, SOLUTION INTRAVENOUS; SUBCUTANEOUS at 15:36

## 2022-09-12 RX ADMIN — FENTANYL CITRATE ONE MCG: 50 INJECTION, SOLUTION INTRAMUSCULAR; INTRAVENOUS at 15:55

## 2022-09-12 RX ADMIN — HEPARIN SODIUM SCH UNIT: 5000 INJECTION, SOLUTION INTRAVENOUS; SUBCUTANEOUS at 22:50

## 2022-09-12 RX ADMIN — SODIUM CHLORIDE SCH MLS: 0.9 INJECTION, SOLUTION INTRAVENOUS at 15:35

## 2022-09-12 RX ADMIN — CEFEPIME SCH MLS/HR: 1 INJECTION, POWDER, FOR SOLUTION INTRAMUSCULAR; INTRAVENOUS at 01:00

## 2022-09-12 RX ADMIN — INSULIN HUMAN SCH: 100 INJECTION, SOLUTION PARENTERAL at 18:18

## 2022-09-12 RX ADMIN — LINEZOLID SCH MLS/HR: 600 INJECTION, SOLUTION INTRAVENOUS at 02:56

## 2022-09-12 RX ADMIN — MORPHINE SULFATE PRN MG: 4 INJECTION, SOLUTION INTRAMUSCULAR; INTRAVENOUS at 20:04

## 2022-09-12 RX ADMIN — INSULIN HUMAN SCH: 100 INJECTION, SUSPENSION SUBCUTANEOUS at 18:23

## 2022-09-12 RX ADMIN — FENTANYL CITRATE ONE MCG: 50 INJECTION, SOLUTION INTRAMUSCULAR; INTRAVENOUS at 15:50

## 2022-09-12 RX ADMIN — OXYCODONE AND ACETAMINOPHEN PRN TAB: 5; 325 TABLET ORAL at 08:43

## 2022-09-12 RX ADMIN — INSULIN HUMAN SCH UNITS: 100 INJECTION, SOLUTION PARENTERAL at 22:52

## 2022-09-12 RX ADMIN — MIDAZOLAM ONE MG: 1 INJECTION INTRAMUSCULAR; INTRAVENOUS at 15:33

## 2022-09-12 RX ADMIN — INSULIN HUMAN SCH: 100 INJECTION, SOLUTION PARENTERAL at 08:45

## 2022-09-12 RX ADMIN — MIDAZOLAM ONE MG: 1 INJECTION INTRAMUSCULAR; INTRAVENOUS at 15:50

## 2022-09-12 RX ADMIN — FENTANYL CITRATE ONE MCG: 50 INJECTION, SOLUTION INTRAMUSCULAR; INTRAVENOUS at 15:33

## 2022-09-12 RX ADMIN — NIFEDIPINE SCH MG: 30 TABLET, FILM COATED, EXTENDED RELEASE ORAL at 09:12

## 2022-09-12 RX ADMIN — INSULIN HUMAN SCH: 100 INJECTION, SOLUTION PARENTERAL at 01:01

## 2022-09-12 RX ADMIN — Medication SCH ML: at 18:22

## 2022-09-12 RX ADMIN — INSULIN HUMAN SCH: 100 INJECTION, SOLUTION PARENTERAL at 13:00

## 2022-09-12 RX ADMIN — MIDAZOLAM ONE MG: 1 INJECTION INTRAMUSCULAR; INTRAVENOUS at 15:55

## 2022-09-12 RX ADMIN — MIDAZOLAM ONE MG: 1 INJECTION INTRAMUSCULAR; INTRAVENOUS at 16:20

## 2022-09-12 RX ADMIN — HEPARIN SODIUM SCH UNIT: 5000 INJECTION, SOLUTION INTRAVENOUS; SUBCUTANEOUS at 09:12

## 2022-09-12 RX ADMIN — CILOSTAZOL SCH MG: 100 TABLET ORAL at 22:51

## 2022-09-12 RX ADMIN — Medication SCH ML: at 22:54

## 2022-09-12 RX ADMIN — HEPARIN SODIUM SCH: 5000 INJECTION, SOLUTION INTRAVENOUS; SUBCUTANEOUS at 13:00

## 2022-09-12 NOTE — EVENT NOTE
Date: 09/12/22





Pt chart reviewed. Tm 100. WBC trending down.





CXR - no acute findings


HbA1C 6.5





67-year-old male with





1.  Infected diabetic foot wound left third toe


2.  Osteomyelitis


3.  Peripheral arterial disease





Plan:


1. Consistent carb diet after revasc procedure today


2. strict glucose control


3. gentle IVF


4. IV abx - ID on board


5. Daily wound care


6. Vascular on board - for revasc today


7. MRI L foot  pending


8.  Patient at the very least will need an amputation of his left third toe.  We

will await results from revascularization procedure and MRI left foot to 

determine extent of amputation.


9.  Will submit for HBOT auth via outpatient wound clinic. Pt to follow up there

at [place].








Thank you for this consultation. Please call with any questions or concerns.

## 2022-09-12 NOTE — EVENT NOTE
Date: 09/12/22





Patient has thrombocytopenia.  Received 1 unit pheresis yesterday and today.  

Repeat CBC demonstrated platelet count 47.  We will proceed with rev

ascularization.  Risk discussed with patient.





Ordered 1 more unit of platelet pheresis.





May need more platelet infusions for toe amputation tomorrow.

## 2022-09-12 NOTE — CONSULTATION
History of Present Illness





- Reason for Consult


Consult date: 09/12/22


abx management


Requesting physician: TONYA GUALLPA





- History of Present Illness





The patient is a 67-year-old male with diabetes, hypertension, peripheral 

vascular disease with previous history of RLE arterial bypass, right TMA, former

smoker quit in 2006 was admitted to the hospital with left third toe wound.  

Ulcer has been present for the last 1 month, following up outpatient with 

podiatry who referred him to vascular surgery.  Patient was given p.o. 

doxycycline as an outpatient but due to worsening ulcer and infection along with

odor, came to the hospital.  X-ray showed findings concerning for osteomyelitis.

 Vascular and general surgery are following.  Awaiting revascularization and 

possible to amputation.  ID was consulted for antibiotic management.  Low-grade 

fever of 100 F.  Labs revealed leukocytosis which appears to be improving.  

There is also significant thrombocytopenia.





Review of Systems: 


General: no fevers,chills or rigors


HEENT: no new visual disturbance


Respiratory: No cough, sputum, hemoptysis or shortness of breath


Cardiovascular: No chest pain, syncope


Gastrointestinal: No nausea, vomiting or diarrhea


Genitourinary: No dysuria or hematuria


Musculoskeletal: No new or worsening neck pain or back pain 


Neurologic: No headaches, seizures


Hematologic: No easy bruising or bleeding


Endocrine: No night sweats or acute weight loss


Skin: negative for rash, jaundice


Psychiatric: No suicidal or homicidal ideation





Past History


Past Medical History: diabetes, PVD (s/p RLE bypass 10+ years ago, occluded 

(pop-dp) ; s/p LLE SFA/pop stenting at outside facility years ago), renal royce

lure (CKD)


Past Surgical History: Other (PVD surgery; transmetatarsal amputation of right 

foot)


Social history: no significant social history


Family history: no significant family history





Medications and Allergies


                                    Allergies











Allergy/AdvReac Type Severity Reaction Status Date / Time


 


No Known Allergies Allergy   Verified 09/10/22 05:35











                                Home Medications











 Medication  Instructions  Recorded  Confirmed  Last Taken  Type


 


Atorvastatin 40 mg PO DAILY 09/10/22 09/10/22 09/09/22 History


 


Chlorthalidone 12.5 mg PO DAILY 09/10/22 09/10/22 09/09/22 History


 


Lisinopril 30 mg PO DAILY 09/10/22 09/10/22 09/09/22 History


 


Metoprolol 100 mg PO BID 09/10/22 09/10/22 09/09/22 History


 


Sertraline 100 mg PO DAILY 09/10/22 09/10/22 09/09/22 History


 


Tamsulosin [Flomax] 0.4 mg PO QDAY 09/10/22 09/10/22 09/09/22 History


 


traZODone [Desyrel] 100 mg PO QHS 09/10/22 09/10/22 Unknown History











Active Meds: 


Active Medications





Acetaminophen (Acetaminophen 325 Mg Tab)  650 mg PO Q4H PRN


   PRN Reason: Pain MILD(1-3)/Fever >100.5/HA


   Last Admin: 09/12/22 01:31 Dose:  650 mg


   


Atorvastatin Calcium (Atorvastatin 40 Mg Tab)  40 mg PO QHS ELVIE


   Last Admin: 09/11/22 21:08 Dose:  40 mg


   


Dextrose (Dextrose 50% In Water (25gm) 50 Ml Syringe)  50 ml IV Q30MIN PRN; 

Protocol


   PRN Reason: Hypoglycemia


Heparin Sodium (Porcine) (Heparin 5,000 Unit/1 Ml Vial)  5,000 unit SUB-Q Q8HR 

ELVIE


   Last Admin: 09/12/22 09:12 Dose:  5,000 unit


   


Sodium Chloride (Nacl 0.9% 1000 Ml)  1,000 mls @ 100 mls/hr IV AS DIRECT ELVIE


   Last Admin: 09/11/22 06:16 Dose:  100 mls/hr


   


Cefepime HCl (Cefepime/Ns 1 Gm/100 Ml)  1 gm in 100 mls @ 200 mls/hr IV Q8H ELVIE;

 Protocol


   Last Admin: 09/12/22 01:00 Dose:  200 mls/hr


   


Linezolid (Zyvox 600mg/300ml)  600 mg in 300 mls @ 300 mls/hr IV Q12H ELVIE; 

Protocol


   Last Admin: 09/12/22 02:56 Dose:  300 mls/hr


   


Insulin Human Isoph/Insulin Regular (Insulin Nph/Regular 70/30 Inj)  25 unit 

SUB-Q BIDDIAB ELVIE


   Last Admin: 09/12/22 09:12 Dose:  Not Given


   


Insulin Human Regular (Insulin Regular, Human 100 Units/1 Ml)  5 units SUB-Q 

ACHS ELVIE


   Last Admin: 09/12/22 08:45 Dose:  Not Given


   


Insulin Human Regular (Insulin Regular, Human 100 Units/1 Ml)  0 units SUB-Q Q6H

 ELVIE; Protocol


   Last Admin: 09/12/22 06:48 Dose:  Not Given


   


Morphine Sulfate (Morphine 4 Mg/1 Ml Inj)  2 mg IV Q4H PRN


   PRN Reason: Pain , Severe (7-10)


Nifedipine (Nifedipine Xl 30 Mg Tab)  30 mg PO QDAY [facility]


   Last Admin: 09/12/22 09:12 Dose:  30 mg


   


Ondansetron HCl (Ondansetron 4 Mg/2 Ml Inj)  4 mg IV Q8H PRN


   PRN Reason: Nausea And Vomiting


Oxycodone/Acetaminophen (Oxycodone /Acetaminophen 5-325mg Tab)  1 tab PO Q6H PRN


   PRN Reason: Pain, Moderate (4-6)


   Last Admin: 09/12/22 08:43 Dose:  1 tab


   


Povidone Iodine (Povidone-Iodine Ointment 28.35 Gm)  1 applic TP DAILY [facility]


   Last Admin: 09/11/22 13:53 Dose:  1 applic


   


Sodium Chloride (Sodium Chloride 0.9% 10 Ml Flush Syringe)  10 ml IV BID [facility]


   Last Admin: 09/11/22 21:10 Dose:  10 ml


   


Sodium Chloride (Sodium Chloride 0.9% 10 Ml Flush Syringe)  10 ml IV PRN PRN


   PRN Reason: LINE FLUSH











Physical Examination





- Physical Exam


Narrative exam: 





Physical Exam: 


Constitutional: Alert, cooperative. No acute distress


Head, Ears, Nose: Normocephalic, atraumatic. External ears, nose normal


Eyes: Conjunctivae/corneas clear. No icterus. No ptosis.


Neck: Supple, no meningeal signs


Cardiovascular: S1, S2 +


Respiratory: Good air entry, clear to auscultation bilaterally


GI: Soft, non-tender; bowel sounds normal. No peritoneal signs


Musculoskeletal: Right TMA well-healed, left foot in dressing


Skin: No rash or abscess


Hem/Lymphatic: No palpable cervical or supraclavicular nodes. No lymphangitis


Psych: Mood ok. Affect normal


Neurological: Awake, alert, oriented. No gross abnormality





- Constitutional


Vitals: 


                                   Vital Signs











Temp Pulse Resp BP Pulse Ox


 


 98.5 F   99 H  18   140/64   99 


 


 09/12/22 04:50  09/12/22 04:50  09/12/22 04:50  09/12/22 04:50  09/12/22 04:50








                           Temperature -Last 24 Hours











Temperature                    98.5 F


 


Temperature                    100.0 F


 


Temperature                    100.0 F


 


Temperature                    98.6 F


 


Temperature                    98.2 F


 


Temperature                    98.6 F

















Results





- Labs


CBC & Chem 7: 


                                09/12/22 Unknown





                                09/12/22 Unknown


Labs: 


                              Abnormal lab results











  09/11/22 09/11/22 09/11/22 Range/Units





  07:39 11:39 16:33 


 


WBC     (4.5-11.0)  K/mm3


 


RBC     (3.65-5.03)  M/mm3


 


Hgb     (11.8-15.2)  gm/dl


 


Hct     (35.5-45.6)  %


 


MCV     (84-94)  fl


 


MCHC     (32-34)  %


 


RDW     (13.2-15.2)  %


 


Plt Count     (140-440)  K/mm3


 


Mono % (Auto)     (0.0-7.3)  %


 


Mono # (Auto)     (0.0-0.8)  K/mm3


 


Seg Neutrophils %     (40.0-70.0)  %


 


Seg Neutrophils #     (1.8-7.7)  K/mm3


 


Sodium     (137-145)  mmol/L


 


Glucose     ()  mg/dL


 


POC Glucose  180 H  202 H  182 H  ()  mg/dL














  09/12/22 09/12/22 09/12/22 Range/Units





  06:01 Unknown Unknown 


 


WBC   17.9 H   (4.5-11.0)  K/mm3


 


RBC   2.71 L   (3.65-5.03)  M/mm3


 


Hgb   8.6 L   (11.8-15.2)  gm/dl


 


Hct   27.3 L   (35.5-45.6)  %


 


MCV   101 H   (84-94)  fl


 


MCHC   31 L   (32-34)  %


 


RDW   13.1 L   (13.2-15.2)  %


 


Plt Count   47 L   (140-440)  K/mm3


 


Mono % (Auto)   11.9 H   (0.0-7.3)  %


 


Mono # (Auto)   2.1 H   (0.0-0.8)  K/mm3


 


Seg Neutrophils %   70.7 H   (40.0-70.0)  %


 


Seg Neutrophils #   12.6 H   (1.8-7.7)  K/mm3


 


Sodium    136 L  (137-145)  mmol/L


 


Glucose    174 H  ()  mg/dL


 


POC Glucose  150 H    ()  mg/dL














- Imaging and Cardiology


Chest x-ray: report reviewed, image reviewed (no pneumonia)





Assessment and Plan





Cultures:


9/10/2022 blood culture: No growth





A/P:


67-year-old male with diabetes, hypertension, peripheral vascular disease with 

previous history of RLE arterial bypass, right TMA, former smoker quit in 2006 

was admitted to the hospital with left third toe wound:





#Sepsis, secondary to diabetic foot infection, osteomyelitis of left foot third 

toe: Risk factors diabetes and peripheral vascular disease. WBC improving. 





#Thrombocytopenia: ?acute





#JASMEET: Improving.  Renally adjust antibiotics as needed.





#Peripheral vascular disease, history of RLE arterial bypass, prior right TMA.





#Diabetes mellitus type 2, uncontrolled





Recs:


Continue IV cefepime


Due to thrombocytopenia, will discontinue linezolid and switch to IV daptomycin.

  Avoiding vancomycin due to patient's CKD and elevated creatinine at the time 

of admission


Follow-up revascularization and toe amputation plans





Freddie Pineda MD, FACP, [facility] ([facility])


O: [phone]


F: [phone]


C: [phone]

## 2022-09-12 NOTE — OPERATIVE REPORT
Operative Report


Operative Report: 





EXAM: 


1. Ultrasound guided access of the right common femoral artery


2. Angiography of the right lower extremity


3. Selection of the abdominal aorta, left common iliac arteries, left 

superficial femoral artery, left tibioperoneal trunk with angiography


4. Fluoroscopic guided placement of a 7 mm spider in the left popliteal artery


5.  Atherectomy of the left above-the-knee popliteal artery with a Hawkone M 

device


6.  Angioplasty of the left above-the-knee popliteal artery with a 6 mm x 150 mm

0.018 iNPACT balloon


7.  Fluoroscopic guided retrieval of the spider embolic protection device


8.  Angioplasty of the left common iliac artery with a 7 mm x 40 mm angioplasty 

balloon


9.  Closure of the right common femoral artery with a 6 Japanese Pro stent


 


DATE: 9/12/2022


 


: TONYA GUALLPA MD


 


INDICATION: PVD with gangrene of the left third digit


 


MEDICATIONS: Please see nursing report for full details.


 


DEVICES: 


Hawkone M device


6 mm x 150 mm 0.018 iNPACT balloon


7 mm x 40 mm angioplasty balloon


 


CONTRAST: Please see Cath Lab report for full detail


 


PROCEDURE: 


The risk, benefits, and alternatives were discussed with the patient; written 

informed consent was obtained.





The patient was transferred to the angiography table in stable condition and his

groins were prepped and draped in a sterile fashion.





Ultrasound was used to evaluate the right common femoral artery which was 

patent.





Under direct ultrasound guidance, the right common femoral artery was accessed 

with a 21-gauge micropuncture needle.  0.018 inch wire was passed into the 

aorta.  Needle was exchanged for transitional dilator.  Wire was exchanged for a

0.035 inch wire.  Transitional dilator was exchanged for 5 Japanese sheath.





Digital subtraction angiography was performed demonstrating appropriate 

puncture, above the bifurcation below the inferior epigastric artery.





The right external iliac artery, common femoral artery, proximal profunda 

femoral artery and proximal superficial femoral artery were patent.





The abdominal aorta was selected and digital subtraction angiography was 

performed.  Left external iliac artery was selected and digital subtraction 

angiography was performed.  The left profunda femoral artery was selected and 

digital subtraction angiography was performed.  The left superficial femoral ar

alysia was selected and digital subtraction angiography was performed.





The infrarenal abdominal aorta is patent.





The left proximal common iliac artery is patent.  The left mid common iliac 

artery has a 50% stenosis.  The left distal common iliac artery is patent.





The left external iliac artery is patent.  Left internal iliac artery is 

diffusely atherosclerotic.





The left common femoral artery has a high bifurcation but is patent.  The left 

profunda femoral artery is patent.





The left proximal and mid superficial femoral artery are patent.





The left distal superficial femoral artery is stented and the stented area is 

patent.  Immediately below the stented area in the above-the-knee popliteal 

artery is a 70% calcific eccentric stenosis and some 30 to 40% stenosis.  The 

left mid knee popliteal artery is patent.  The left below-knee popliteal artery 

has a 30 to 40% stenosis.





The tibioperoneal trunk is patent.  There is two-vessel runoff through the 

peroneal artery and the posterior tibial artery.  These vessels are both 

predominantly patent.  There is 20 to 30% irregular stenosis in the mid proximal

to midportion of the vessel but otherwise they are patent.  The medial and 

lateral plantar arteries are patent.





Anterior tibial artery is occluded soon after takeoff and reconstitutes at the 

level of the dorsalis pedis artery through wispy collaterals from the peroneal 

artery.  The reconstituted vessel is very small in diameter.





After reviewing the diagnostic imaging, the patient was heparinized.  Sheath was

then exchanged for a 6 Japanese [place] destination positioned in the left 

superficial femoral artery.





Wire and catheter used to cross into the popliteal artery and then the tibial 

peroneal trunk was selected.  Digital subtraction angiography was performed 

confirming position and demonstrating the above-mentioned runoff.  The catheter 

was retracted to the popliteal artery and a 7 mm spider embolic protection 

device was deployed in the left mid popliteal artery.





Atherectomy was performed of the above-the-knee popliteal artery with a Hawkone 

M device.  Once there was less than 40% residual stenosis, I used a 6 mm x 150 

mm 0.018 iNPACT balloon to perform angioplasty of the above-the-knee popliteal 

artery region.





Digital subtraction angiography was performed demonstrating less than 10% 

residual narrowing of the above-the-knee popliteal artery.





The embolic protection device was then retrieved.  Hub was removed and flushed. 

Hub was reattached.  Digital subtraction angiography was performed demonstrating

unchanged runoff.





Sheath was then retracted to the left proximal common iliac artery and 7 mm x 40

mm angioplasty balloon was used to perform angioplasty of the left mid common 

iliac artery.





Digital subtraction angiography was then performed demonstrating less than 30% 

residual stenosis.  I considered stenting this lesion, but the patient was 

having emesis during the procedure and prior to the procedure which was only 

partially being effectively treated with Zofran.  Therefore, I decided to 

conclude the procedure with angioplasty alone.





All wires, catheters, and sheaths were then retracted to the right external 

iliac artery and the right common femoral artery arteriotomy was closed with a 6

Japanese Pro style device.  There is immediate acquisition of hemostasis.





Sterile dressing and pressure dressing applied.





The patient tolerated the procedure well except for emesis which predated the 

procedure.  No immediate postprocedural complications.


 


FINDINGS: Please see procedure note above.


 


IMPRESSION: 


Successful atherectomy and angioplasty of the left above-the-knee popliteal 

artery.


Successful angioplasty of the left common iliac artery.


Successful third order selection of the left tibioperoneal trunk.

## 2022-09-12 NOTE — POST OPERATIVE NOTE
Date of procedure: 09/12/22


Pre-op diagnosis: PVD with gangrene of the left 3rd digit


Post-op diagnosis: same


Findings: 





Patient had adverse effect to Versed with nausea and vomiting.





Zofran provided.  12 mg of Zofran had to be provided for control of vomiting 

during procedure.





May need Reglan.


Procedure: 





1. Ultrasound guided access of the right common femoral artery


2. Angiography of the right lower extremity


3. Selection of the abdominal aorta, left common iliac arteries, left 

superficial femoral artery, left tibioperoneal trunk with angiography


4. Fluoroscopic guided placement of a 7 mm spider in the left popliteal artery


5.  Atherectomy of the left above-the-knee popliteal artery with a [employer] M 

device


6.  Angioplasty of the left above-the-knee popliteal artery with a 6 mm x 150 mm

0.018 iNPACT balloon


7.  Angioplasty of the left common iliac artery with a 7 mm x 40 mm angioplasty 

balloon


8.  Closure of the right common femoral artery with a 6 [place] Pro stent


Anesthesia: local (With conscious sedation)


Surgeon: TONYA GUALLPA


Estimated blood loss: minimal


Condition: stable


Disposition: floor

## 2022-09-12 NOTE — PROGRESS NOTE
Assessment and Plan


Assessment and plan: 





#Wet gangrene of left third distal phalanx and middle phalanx


#Diabetic foot infection with ulceration of left third digit


#Osteomyelitis of left foot


#Peripheral arterial disease of bilateral lower extremities


Visualized on x-ray of left foot and CT of left lower extremity. Pending MRI 

left foot


WBC 27-->21-->17.9


Continue cefepime 1 g every 8 hours and IV linezolid 600 mg every 12 hours.  

Status post aggressive IV fluid resuscitation.


Infectious disease consulted; appreciate recs


Vascular surgery consulted; appreciate recs.  Planning for revascularization on

2022.


General surgery consulted; appreciate recs.  Plan for possible amputation on 

2022.


Continue to monitor





#Insulin dependent type II diabetes mellitus with hyperglycemiaimproving


Worsened by diabetic foot infection


- hemoglobin A1c: 6.5


- home regimen: Unknown


- current regimen: NPH 25 units twice daily + moderate SSI +8 units regular 

insulin with meals


- blood glucose goal 140-180 while inpatient


- continue to monitor





#Pseudohyponatremiaresolved


Sodium 129 (corrected 136)





#JASMEET on likely CKD stage IIIresolved


Creatinine 1.7-->0.9 (baseline unknown)


Status post aggressive IV fluid resuscitation


Renally dose meds and avoid nephrotoxic drugs.


Monitor with repeat BMP tomorrow.  Consider nephrology consult showed cre

atinine 1 continue to increase.





#Hypertension


- home medications: Unknown


- current medications: Started nifedipine 30 mg daily


- SBP goal <160 and DBP goal <90 while inpatient


- continue to monitor





#Iron deficiency anemia


#anemia of chronic disease


Hemoglobin 8.2


 Iron 16, TIBC 133, ferritin 2880.  Transfuse if hemoglobin <7





#Chronic thrombocytopenia


Platelets 31-->27-->47 (status post transfusion of 1 platelet on 2022)


Patient endorses being aware but not having etiology determined by other 

clinicians.


 Pending transfusion of 1 additional jumbo platelet for upcoming amputation by 

general surgery.





#Moderate protein caloric malnutrition


Albumin 3.6


Will initiate dietary supplementation once hyperglycemia is under control.





#Advanced care planning


-Disease education conducted, care plan discussed, diagnoses discussed, 

prognosis discussed, and patient acknowledges understanding with care plan


-Time: +30 min


Disposition Plan: Continue medical management


Total Time Spent with Patient (Minutes): 45 minutes





History


Interval history: 





No acute events overnight.





Hospitalist Physical





- Constitutional


Vitals: 


                                        











Temp Pulse Resp BP Pulse Ox


 


 99.5 F   94 H  16   147/71   96 


 


 22 10:29  22 10:29  22 10:29  22 10:29  22 10:29











General appearance: Present: no acute distress, well-nourished, malodorous 

(Malodorous wet gangrene of left foot with ulceration of third digit)





- EENT


Eyes: Present: PERRL, EOM intact


ENT: hearing intact, clear oral mucosa, dentition normal





- Neck


Neck: Present: supple, normal ROM





- Respiratory


Respiratory effort: normal


Respiratory: bilateral: CTA





- Cardiovascular


Rhythm: regular


Heart Sounds: Present: S1 & S2





- Extremities


Extremities: no ischemia, pulses symmetrical, normal temperature, Full ROM, 

abnormal (Right transmetatarsal amputation)


Extremity abnormal: ulceration (Ulceration of left third digit with wet 

gangrene)


Peripheral Pulses: within normal limits





- Abdominal


General gastrointestinal: soft, non-tender, non-distended, normal bowel sounds





- Integumentary


Integumentary: Present: clear, warm, dry





- Psychiatric


Psychiatric: appropriate mood/affect, intact judgment & insight, memory intact, 

cooperative





- Neurologic


Neurologic: CNII-XII intact, moves all extremities





- Allied Health


Allied health notes reviewed: nursing





Results





- Labs


CBC & Chem 7: 


                                22 Unknown





                                22 Unknown


Labs: 


                             Laboratory Last Values











WBC  17.9 K/mm3 (4.5-11.0)  H  22  Unknown


 


RBC  2.71 M/mm3 (3.65-5.03)  L  22  Unknown


 


Hgb  8.6 gm/dl (11.8-15.2)  L  22  Unknown


 


Hct  27.3 % (35.5-45.6)  L  22  Unknown


 


MCV  101 fl (84-94)  H  22  Unknown


 


MCH  32 pg (28-32)   22  Unknown


 


MCHC  31 % (32-34)  L  22  Unknown


 


RDW  13.1 % (13.2-15.2)  L  22  Unknown


 


Plt Count  47 K/mm3 (140-440)  L  22  Unknown


 


Lymph % (Auto)  16.2 % (13.4-35.0)   22  Unknown


 


Mono % (Auto)  11.9 % (0.0-7.3)  H  22  Unknown


 


Eos % (Auto)  0.7 % (0.0-4.3)   22  Unknown


 


Baso % (Auto)  0.5 % (0.0-1.8)   22  Unknown


 


Lymph # (Auto)  2.9 K/mm3 (1.2-5.4)   22  Unknown


 


Mono # (Auto)  2.1 K/mm3 (0.0-0.8)  H  22  Unknown


 


Eos # (Auto)  0.1 K/mm3 (0.0-0.4)   22  Unknown


 


Baso # (Auto)  0.1 K/mm3 (0.0-0.1)   22  Unknown


 


Add Manual Diff  Complete   22  05:35    


 


Total Counted  200   22  05:35    


 


Seg Neutrophils %  70.7 % (40.0-70.0)  H  22  Unknown


 


Seg Neuts % (Manual)  81.5 % (40.0-70.0)  H  22  05:35    


 


Band Neutrophils %  0 %  22  05:35    


 


Lymphocytes % (Manual)  12.0 % (13.4-35.0)  L  22  05:35    


 


Reactive Lymphs % (Man)  0 %  22  05:35    


 


Monocytes % (Manual)  4.5 % (0.0-7.3)   22  05:35    


 


Eosinophils % (Manual)  2.0 % (0.0-4.3)   22  05:35    


 


Basophils % (Manual)  0 % (0.0-1.8)   22  05:35    


 


Metamyelocytes %  0 %  22  05:35    


 


Myelocytes %  0 %  22  05:35    


 


Promyelocytes %  0 %  22  05:35    


 


Blast Cells %  0 %  22  05:35    


 


Nucleated RBC %  Not Reportable   22  05:35    


 


Seg Neutrophils #  12.6 K/mm3 (1.8-7.7)  H  22  Unknown


 


Seg Neutrophils # Man  17.3 K/mm3 (1.8-7.7)  H  22  05:35    


 


Band Neutrophils #  0.0 K/mm3  22  05:35    


 


Lymphocytes # (Manual)  2.5 K/mm3 (1.2-5.4)   22  05:35    


 


Abs React Lymphs (Man)  0.0 K/mm3  22  05:35    


 


Monocytes # (Manual)  1.0 K/mm3 (0.0-0.8)  H  22  05:35    


 


Eosinophils # (Manual)  0.4 K/mm3 (0.0-0.4)   22  05:35    


 


Basophils # (Manual)  0.0 K/mm3 (0.0-0.1)   22  05:35    


 


Metamyelocytes #  0.0 K/mm3  22  05:35    


 


Myelocytes #  0.0 K/mm3  22  05:35    


 


Promyelocytes #  0.0 K/mm3  22  05:35    


 


Blast Cells #  0.0 K/mm3  22  05:35    


 


WBC Morphology  Not Reportable   22  05:35    


 


Hypersegmented Neuts  Not Reportable   22  05:35    


 


Hyposegmented Neuts  Not Reportable   22  05:35    


 


Hypogranular Neuts  Not Reportable   22  05:35    


 


Smudge Cells  Not Reportable   22  05:35    


 


Toxic Granulation  Not Reportable   22  05:35    


 


Toxic Vacuolation  Not Reportable   22  05:35    


 


Dohle Bodies  Not Reportable   22  05:35    


 


Pelger-Huet Anomaly  Not Reportable   22  05:35    


 


Olive Rods  Not Reportable   22  05:35    


 


Platelet Estimate  Consistent w auto   22  05:35    


 


Clumped Platelets  Not Reportable   22  05:35    


 


Plt Clumps, EDTA  Not Reportable   22  05:35    


 


Large Platelets  Not Reportable   22  05:35    


 


Giant Platelets  Not Reportable   22  05:35    


 


Platelet Satelliting  Not Reportable   22  05:35    


 


Plt Morphology Comment  Not Reportable   22  05:35    


 


RBC Morphology  Not Reportable   22  05:35    


 


Dimorphic RBCs  Not Reportable   22  05:35    


 


Polychromasia  Not Reportable   22  05:35    


 


Hypochromasia  Not Reportable   22  05:35    


 


Poikilocytosis  Not Reportable   22  05:35    


 


Anisocytosis  1+   22  05:35    


 


Microcytosis  Not Reportable   22  05:35    


 


Macrocytosis  1+   22  05:35    


 


Spherocytes  Not Reportable   22  05:35    


 


Pappenheimer Bodies  Not Reportable   22  05:35    


 


Sickle Cells  Not Reportable   22  05:35    


 


Target Cells  Not Reportable   22  05:35    


 


Tear Drop Cells  Not Reportable   22  05:35    


 


Ovalocytes  Not Reportable   22  05:35    


 


Helmet Cells  Not Reportable   22  05:35    


 


[place] Bodies  Not Reportable   22  05:35    


 


Cabot Rings  Not Reportable   22  05:35    


 


Jamison Cells  Not Reportable   22  05:35    


 


Bite Cells  Not Reportable   22  05:35    


 


Crenated Cell  Not Reportable   22  05:35    


 


Elliptocytes  Not Reportable   22  05:35    


 


Acanthocytes (Spur)  Not Reportable   22  05:35    


 


Rouleaux  Not Reportable   22  05:35    


 


Hemoglobin C Crystals  Not Reportable   22  05:35    


 


Schistocytes  Not Reportable   22  05:35    


 


Malaria parasites  Not Reportable   22  05:35    


 


Tej Bodies  Not Reportable   22  05:35    


 


Hem Pathologist Commnt  No   22  05:35    


 


PT  14.5 Sec. (12.2-14.9)   22  Unknown


 


INR  0.99  (0.87-1.13)   22  Unknown


 


Sodium  136 mmol/L (137-145)  L  22  Unknown


 


Potassium  4.3 mmol/L (3.6-5.0)   22  Unknown


 


Chloride  99.2 mmol/L ()   22  Unknown


 


Carbon Dioxide  27 mmol/L (22-30)   22  Unknown


 


Anion Gap  14 mmol/L  22  Unknown


 


BUN  13 mg/dL (9-20)   22  Unknown


 


Creatinine  0.8 mg/dL (0.8-1.3)   22  Unknown


 


Estimated GFR  > 60 ml/min  22  Unknown


 


BUN/Creatinine Ratio  16 %  22  Unknown


 


Glucose  174 mg/dL ()  H  22  Unknown


 


POC Glucose  150 mg/dL ()  H  22  06:01    


 


Hemoglobin A1c  6.5 % (4-6)  H  09/10/22  08:41    


 


Lactic Acid  1.50 mmol/L (0.7-2.0)   09/10/22  02:26    


 


Calcium  8.6 mg/dL (8.4-10.2)   22  Unknown


 


Iron  16 ug/dL ()  L  22  05:35    


 


TIBC  133 mcg/dL (250-450)  L  22  05:35    


 


Ferritin  2888.0 ng/mL (30.0-300.0)  H  22  05:35    


 


Total Bilirubin  0.70 mg/dL (0.1-1.2)   09/10/22  02:26    


 


AST  41 units/L (5-40)  H  09/10/22  02:26    


 


ALT  28 units/L (7-56)   09/10/22  02:26    


 


Alkaline Phosphatase  129 units/L ()   09/10/22  02:26    


 


Total Protein  6.9 g/dL (6.3-8.2)   09/10/22  02:26    


 


Albumin  3.6 g/dL (3.9-5)  L  09/10/22  02:26    


 


Albumin/Globulin Ratio  1.1 %  09/10/22  02:26    


 


Triglycerides  114 mg/dL (2-149)   09/10/22  08:41    


 


Cholesterol  116 mg/dL ()   09/10/22  08:41    


 


LDL Cholesterol Direct  46 mg/dL ()  L  09/10/22  08:41    


 


HDL Cholesterol  37 mg/dL (40-59)  L  09/10/22  08:41    


 


Cholesterol/HDL Ratio  3.13 %  09/10/22  08:41    


 


Blood Type  O POSITIVE   22  Unknown











Microbiology: 


Microbiology





09/10/22 02:26   Peripheral/Venous   Blood Culture - Preliminary


                            NO GROWTH AFTER 48 HOURS


09/10/22 02:26   Peripheral/Venous   Blood Culture - Preliminary


                            NO GROWTH AFTER 48 HOURS








Toney/IV: 


                                        





Voiding Method                   Urinal











Active Medications





- Current Medications


Current Medications: 














Generic Name Dose Route Start Last Admin





  Trade Name Freq  PRN Reason Stop Dose Admin


 


Acetaminophen  650 mg  09/10/22 08:30  22 01:31





  Acetaminophen 325 Mg Tab  PO   650 mg





  Q4H PRN   Administration





  Pain MILD(1-3)/Fever >100.5/HA  


 


Aspirin  81 mg  22 12:00 





  Aspirin Ec 81 Mg Tab  PO  





  QDAY ELVIE  


 


Atorvastatin Calcium  40 mg  22 22:00  22 21:08





  Atorvastatin 40 Mg Tab  PO   40 mg





  QHS ELVIE   Administration


 


Cilostazol  100 mg  22 12:00 





  Cilostazol 100 Mg Tab  PO  





  BID ELVIE  


 


Dextrose  50 ml  09/10/22 08:35 





  Dextrose 50% In Water (25gm) 50 Ml Syringe  IV  





  Q30MIN PRN  





  Hypoglycemia  





  Protocol  


 


Heparin Sodium (Porcine)  5,000 unit  09/10/22 14:00  22 09:12





  Heparin 5,000 Unit/1 Ml Vial  SUB-Q   5,000 unit





  Q8HR ELVIE   Administration


 


Sodium Chloride  1,000 mls @ 100 mls/hr  09/10/22 05:30  22 06:16





  Nacl 0.9% 1000 Ml  IV   100 mls/hr





  AS DIRECT ELVIE   Administration


 


Cefepime HCl  1 gm in 100 mls @ 200 mls/hr  09/10/22 09:00  22 01:00





  Cefepime/Ns 1 Gm/100 Ml  IV   200 mls/hr





  Q8H ELVIE   Administration





  Protocol  


 


Daptomycin 420 mg/ Sodium  100 mls @ 200 mls/hr  22 12:00 





  Chloride  IV  





  Q24H [facility]  





  Protocol  


 


Insulin Human Isoph/Insulin Regular  25 unit  09/10/22 19:16  22 09:12





  Insulin Nph/Regular 70/30 Inj  SUB-Q   Not Given





  BIDDIAB [facility]  


 


Insulin Human Regular  5 units  09/10/22 22:00  22 08:45





  Insulin Regular, Human 100 Units/1 Ml  SUB-Q   Not Given





  ACHS [facility]  


 


Insulin Human Regular  0 units  22 00:00  22 06:48





  Insulin Regular, Human 100 Units/1 Ml  SUB-Q   Not Given





  Q6H [facility]  





  Protocol  


 


Morphine Sulfate  2 mg  09/10/22 08:30 





  Morphine 4 Mg/1 Ml Inj  IV  





  Q4H PRN  





  Pain , Severe (7-10)  


 


Nifedipine  30 mg  22 10:00  22 09:12





  Nifedipine Xl 30 Mg Tab  PO   30 mg





  QDAY ELVIE   Administration


 


Ondansetron HCl  4 mg  09/10/22 08:30 





  Ondansetron 4 Mg/2 Ml Inj  IV  





  Q8H PRN  





  Nausea And Vomiting  


 


Oxycodone/Acetaminophen  1 tab  09/10/22 08:30  22 08:43





  Oxycodone /Acetaminophen 5-325mg Tab  PO   1 tab





  Q6H PRN   Administration





  Pain, Moderate (4-6)  


 


Povidone Iodine  1 applic  09/10/22 15:00  22 13:53





  Povidone-Iodine Ointment 28.35 Gm  TP   1 applic





  DAILY ELVIE   Administration


 


Sodium Chloride  10 ml  09/10/22 10:00  22 21:10





  Sodium Chloride 0.9% 10 Ml Flush Syringe  IV   10 ml





  BID ELVIE   Administration


 


Sodium Chloride  10 ml  09/10/22 08:30 





  Sodium Chloride 0.9% 10 Ml Flush Syringe  IV  





  PRN PRN  





  LINE FLUSH  














Nutrition/Malnutrition Assess





- Dietary Evaluation


Nutrition/Malnutrition Findings: 


                                        





Nutrition Notes                                            Start:  22 

11:33


Freq:                                                      Status: Active       




Protocol:                                                                       




 Document     22 11:33  KERRIE  (Rec: 22 11:52  KERRIE  [id number])


 Nutrition Notes


     Need for Assessment generated from:         RN Referral


     Initial or Follow up                        Brief Note


     Current Diagnosis                           Diabetes


     Other Pertinent Diagnosis                   Infected (L) diabetic foot, (L


                                                 ) foot osteomyelitis


     Current Diet                                Consistent CHO


     Labs/Tests                                  BUN 29


                                                 


                                                 Iron 16


                                                 TIBC 133


                                                 A1C 6.5


     Pertinent Medications                       NS at 100ml/hr


     Height                                      5 ft 8 in


     Weight                                      72 kg


     Ideal Body Weight (kg)                      70.00


     BMI                                         24.1


     Weight Status                               Appropriate


     Subjective/Other Information                Pt screened for skin risk


                                                 assessment (Sid score: 18).


                                                 He consumed 50% of dinner


                                                 last pm; he is able to feed


                                                 himself.  Revascularization


                                                 procedure scheduled for


                                                 tomorrow; pt will be NPO after


                                                 midnight tonight.  PMHx


                                                 includes PVD (s/p RLE bypass


                                                 10+ yrs ago), (R) foot


                                                 transmetatarsal amputation.


     Burn                                        Absent


     Trauma                                      Absent


     Current % PO                                Fair (50-74%)


     Minimum of two criteria                     No


     Reduced  Strength                       Measurably Reduced (severe)


     Is patient on ventilator?                   No


     Is Patient Ambulatory and/or Out of Bed     No


     REE-([facility]-confined to bed)      [phone]


     Calculation Used for Recommendations        [facility]


     Additional Notes                            Pro needs 1.25-1.5g/k-


                                                 108g/day


                                                 Fluid needs 1ml/kcal


 Nutrition Intervention


     Follow-Up By:                               09/15/22


     Additional Comments                         F/U: intakes, need for ONS

## 2022-09-13 LAB
BAND NEUTROPHILS # (MANUAL): 0 K/MM3
BUN SERPL-MCNC: 14 MG/DL (ref 9–20)
BUN/CREAT SERPL: 16 %
CALCIUM SERPL-MCNC: 8.5 MG/DL (ref 8.4–10.2)
HCT VFR BLD CALC: 24.9 % (ref 35.5–45.6)
HEMOLYSIS INDEX: 5
HGB BLD-MCNC: 8.1 GM/DL (ref 11.8–15.2)
MCHC RBC AUTO-ENTMCNC: 32 % (ref 32–34)
MCV RBC AUTO: 99 FL (ref 84–94)
MYELOCYTES # (MANUAL): 0 K/MM3
PLATELET # BLD: 41 K/MM3 (ref 140–440)
PROMYELOCYTES # (MANUAL): 0 K/MM3
RBC # BLD AUTO: 2.51 M/MM3 (ref 3.65–5.03)
TOTAL CELLS COUNTED BLD: 100

## 2022-09-13 PROCEDURE — 0Y6U0Z3 DETACHMENT AT LEFT 3RD TOE, LOW, OPEN APPROACH: ICD-10-PCS | Performed by: SURGERY

## 2022-09-13 RX ADMIN — INSULIN HUMAN SCH UNIT: 100 INJECTION, SUSPENSION SUBCUTANEOUS at 18:52

## 2022-09-13 RX ADMIN — INSULIN HUMAN SCH UNITS: 100 INJECTION, SOLUTION PARENTERAL at 06:17

## 2022-09-13 RX ADMIN — INSULIN HUMAN SCH UNITS: 100 INJECTION, SOLUTION PARENTERAL at 23:18

## 2022-09-13 RX ADMIN — NIFEDIPINE SCH MG: 30 TABLET, FILM COATED, EXTENDED RELEASE ORAL at 09:26

## 2022-09-13 RX ADMIN — INSULIN HUMAN SCH: 100 INJECTION, SOLUTION PARENTERAL at 13:00

## 2022-09-13 RX ADMIN — HEPARIN SODIUM SCH UNIT: 5000 INJECTION, SOLUTION INTRAVENOUS; SUBCUTANEOUS at 21:49

## 2022-09-13 RX ADMIN — CEFEPIME SCH MLS/HR: 1 INJECTION, POWDER, FOR SOLUTION INTRAMUSCULAR; INTRAVENOUS at 01:00

## 2022-09-13 RX ADMIN — Medication SCH ML: at 23:19

## 2022-09-13 RX ADMIN — MORPHINE SULFATE PRN MG: 4 INJECTION, SOLUTION INTRAMUSCULAR; INTRAVENOUS at 19:01

## 2022-09-13 RX ADMIN — CLOPIDOGREL BISULFATE SCH: 75 TABLET ORAL at 09:46

## 2022-09-13 RX ADMIN — HYDROMORPHONE HYDROCHLORIDE PRN MG: 1 INJECTION, SOLUTION INTRAMUSCULAR; INTRAVENOUS; SUBCUTANEOUS at 15:57

## 2022-09-13 RX ADMIN — SODIUM CHLORIDE SCH MLS/HR: 0.9 INJECTION, SOLUTION INTRAVENOUS at 22:00

## 2022-09-13 RX ADMIN — POVIDONE-IODINE SCH APPLIC: 100 OINTMENT TOPICAL at 09:29

## 2022-09-13 RX ADMIN — INSULIN HUMAN SCH UNITS: 100 INJECTION, SOLUTION PARENTERAL at 18:58

## 2022-09-13 RX ADMIN — INSULIN HUMAN SCH: 100 INJECTION, SUSPENSION SUBCUTANEOUS at 09:29

## 2022-09-13 RX ADMIN — INSULIN HUMAN SCH UNITS: 100 INJECTION, SOLUTION PARENTERAL at 00:00

## 2022-09-13 RX ADMIN — METOPROLOL TARTRATE SCH MG: 100 TABLET, FILM COATED ORAL at 21:49

## 2022-09-13 RX ADMIN — Medication SCH ML: at 09:29

## 2022-09-13 RX ADMIN — INSULIN HUMAN SCH UNITS: 100 INJECTION, SOLUTION PARENTERAL at 18:59

## 2022-09-13 RX ADMIN — HEPARIN SODIUM SCH: 5000 INJECTION, SOLUTION INTRAVENOUS; SUBCUTANEOUS at 17:42

## 2022-09-13 RX ADMIN — INSULIN HUMAN SCH: 100 INJECTION, SOLUTION PARENTERAL at 12:00

## 2022-09-13 RX ADMIN — CILOSTAZOL SCH MG: 100 TABLET ORAL at 09:28

## 2022-09-13 RX ADMIN — HYDROMORPHONE HYDROCHLORIDE PRN MG: 1 INJECTION, SOLUTION INTRAMUSCULAR; INTRAVENOUS; SUBCUTANEOUS at 15:41

## 2022-09-13 RX ADMIN — CILOSTAZOL SCH MG: 100 TABLET ORAL at 21:48

## 2022-09-13 RX ADMIN — METOPROLOL TARTRATE SCH: 100 TABLET, FILM COATED ORAL at 14:52

## 2022-09-13 RX ADMIN — INSULIN HUMAN SCH: 100 INJECTION, SOLUTION PARENTERAL at 17:43

## 2022-09-13 RX ADMIN — OXYCODONE AND ACETAMINOPHEN PRN TAB: 5; 325 TABLET ORAL at 21:49

## 2022-09-13 RX ADMIN — OXYCODONE AND ACETAMINOPHEN PRN TAB: 5; 325 TABLET ORAL at 09:26

## 2022-09-13 RX ADMIN — INSULIN HUMAN SCH UNITS: 100 INJECTION, SOLUTION PARENTERAL at 19:00

## 2022-09-13 RX ADMIN — HEPARIN SODIUM SCH: 5000 INJECTION, SOLUTION INTRAVENOUS; SUBCUTANEOUS at 05:39

## 2022-09-13 RX ADMIN — INSULIN HUMAN SCH: 100 INJECTION, SOLUTION PARENTERAL at 09:30

## 2022-09-13 NOTE — ANESTHESIA CONSULTATION
Anesthesia Consult and Med Hx


Date of service: 09/13/22





- Airway


Anesthetic Teeth Evaluation: Edentulous


ROM Head & Neck: Adequate


Mental/Hyoid Distance: Adequate


Mallampati Class: Class II


Intubation Access Assessment: Probably Good





- Pulmonary Exam


CTA: Yes





- Cardiac Exam


Cardiac Exam: RRR





- Pre-Operative Health Status


ASA Pre-Surgery Classification: ASA3


Proposed Anesthetic Plan: General





- Pulmonary


Hx Smoking: No


Hx Respiratory Symptoms: No





- Cardiovascular System


Hx Hypertension: Yes


Hx Heart Attack/AMI: No


Hx Cardia Arrhythmia: No





- Central Nervous System


Hx Neuromuscular Disorder: No


CVA: No





- Gastrointestinal


Hx Gastroesophageal Reflux Disease: No





- Endocrine


Hx Renal Disease: No (Cr 0.9)


Hx Liver Disease: No


Hx Insulin Dependent Diabetes: Yes


Hx Thyroid Disease: No





- Hematic


Hx Anemia: Yes (hgb 8.1)





- Other Systems


Hx Alcohol Use: No


Hx Obesity: No





- Additional Comments


Anesthesia Medical History Comments: No GAC. No FHAC.

## 2022-09-13 NOTE — PROGRESS NOTE
Assessment and Plan





Cultures:


9/10/2022 blood culture: No growth





A/P:


67-year-old male with diabetes, hypertension, peripheral vascular disease with 

previous history of RLE arterial bypass, right TMA, former smoker quit in 2006 

was admitted to the hospital with left third toe wound:





#Sepsis, secondary to diabetic foot infection, osteomyelitis of left foot third 

toe: Risk factors diabetes and peripheral vascular disease. WBC improving. S/p 

revascularization on 9/12/2022.





#Thrombocytopenia: ?acute





#JAMSEET: Improving.  Renally adjust antibiotics as needed.





#Peripheral vascular disease, history of RLE arterial bypass, prior right TMA. 

S/p revascularization on 9/12/2022.





#Diabetes mellitus type 2, uncontrolled





Recs:


Continue IV cefepime + Daptomycin. Avoiding linezolid due to thrombocytopenia. 


CK elevated at 816, recheck tomorrow, if persistently elevated or worsening, 

will switch IV daptomycin to vancomycin.


awaiting toe amputation 





Freddie Pineda MD, FACP, [facility] ([facility])


O: [phone]


F: [phone]


C: [phone]





Subjective


Date of service: 09/13/22


Interval history: 





Afebrile. Had some nausea, vomiting during procedure yesterday. None currently. 





Objective





- Exam


Narrative Exam: 





Physical Exam: 


Constitutional: Alert, cooperative. No acute distress


Head, Ears, Nose: Normocephalic, atraumatic. External ears, nose normal


Eyes: Conjunctivae/corneas clear. No icterus. No ptosis.


Neck: Supple, no meningeal signs


Cardiovascular: S1, S2 +


Respiratory: Good air entry, clear to auscultation bilaterally


GI: Soft, non-tender; bowel sounds normal. No peritoneal signs


Musculoskeletal: Right TMA well-healed, left foot in dressing


Skin: No rash or abscess


Hem/Lymphatic: No palpable cervical or supraclavicular nodes. No lymphangitis


Psych: Mood ok. Affect normal


Neurological: Awake, alert, oriented. No gross abnormality 





- Constitutional


Vitals: 


                                   Vital Signs











Temp Pulse Resp BP Pulse Ox


 


 98.6 F   117 H  16   162/67   97 


 


 09/13/22 08:07  09/13/22 08:10  09/13/22 09:26  09/13/22 08:10  09/13/22 08:10








                           Temperature -Last 24 Hours











Temperature                    98.6 F


 


Temperature                    99.2 F


 


Temperature                    99.2 F


 


Temperature                    99.1 F


 


Temperature                    99.1 F


 


Temperature                    99 F


 


Temperature                    99 F


 


Temperature                    98.9 F


 


Temperature                    100.0 F


 


Temperature                    99.2 F


 


Temperature                    99.3 F

















- Labs


CBC & Chem 7: 


                                 09/13/22 05:10





                                 09/13/22 05:10


Labs: 


                              Abnormal lab results











  09/12/22 09/12/22 09/12/22 Range/Units





  08:50 12:09 13:02 


 


WBC   22.5 H   (4.5-11.0)  K/mm3


 


RBC   2.70 L   (3.65-5.03)  M/mm3


 


Hgb   8.8 L   (11.8-15.2)  gm/dl


 


Hct   27.2 L   (35.5-45.6)  %


 


MCV   101 H   (84-94)  fl


 


MCH   33 H   (28-32)  pg


 


Plt Count   44 L   (140-440)  K/mm3


 


Seg Neuts % (Manual)   77.0 H   (40.0-70.0)  %


 


Lymphocytes % (Manual)   11.0 L   (13.4-35.0)  %


 


Monocytes % (Manual)   12.0 H   (0.0-7.3)  %


 


Seg Neutrophils # Man   17.3 H   (1.8-7.7)  K/mm3


 


Monocytes # (Manual)   2.7 H   (0.0-0.8)  K/mm3


 


Glucose     ()  mg/dL


 


POC Glucose  191 H   228 H  ()  mg/dL


 


Total Creatine Kinase     ()  units/L














  09/12/22 09/13/22 09/13/22 Range/Units





  20:41 05:10 05:10 


 


WBC   25.0 H   (4.5-11.0)  K/mm3


 


RBC   2.51 L   (3.65-5.03)  M/mm3


 


Hgb   8.1 L   (11.8-15.2)  gm/dl


 


Hct   24.9 L   (35.5-45.6)  %


 


MCV   99 H   (84-94)  fl


 


MCH     (28-32)  pg


 


Plt Count   41 L   (140-440)  K/mm3


 


Seg Neuts % (Manual)   91.0 H   (40.0-70.0)  %


 


Lymphocytes % (Manual)   6.0 L   (13.4-35.0)  %


 


Monocytes % (Manual)     (0.0-7.3)  %


 


Seg Neutrophils # Man   22.8 H   (1.8-7.7)  K/mm3


 


Monocytes # (Manual)     (0.0-0.8)  K/mm3


 


Glucose    197 H  ()  mg/dL


 


POC Glucose  380 H    ()  mg/dL


 


Total Creatine Kinase    816 H  ()  units/L

## 2022-09-13 NOTE — MAGNETIC RESONANCE REPORT
MRI LEFT FOOT WITHOUT AND WITH CONTRAST



INDICATION / CLINICAL INFORMATION: L foot infected diabetic wounds, r/o osteo.



TECHNIQUE: Multiplanar, multisequence MR images were obtained. Pre and postcontrast sequences were ob
tained. 17 mL Clariscan injected IV.



COMPARISON: Radiographs 9/10/2022



FINDINGS:



BONES: There is osseous destruction throughout the middle phalanx of the second toe with similar eros
ions of the adjacent distal and proximal phalanges. There is marrow edema involving the proximal and 
middle phalanges of the third toe. No osseous lesion.

JOINTS: Moderate grade tear MTP joint osteoarthrosis. No significant joint effusion or synovitis. 



MUSCLES: No significant abnormality.



FLEXOR TENDONS: No significant abnormality.

EXTENSOR TENDONS: No significant abnormality.

PERONEAL TENDONS: No significant abnormality.

LIGAMENTS: No significant abnormality.



SOFT TISSUES: There is excess soft tissue thickening about the distal forefoot and toes with postcont
rast enhancement, predominantly involving the second and third toes. There is a peripherally fluid we
ighted sequence hyperintense collection extending along the distal intermetatarsal space of the third
 and fourth rays measuring 2.8 x 0.6 x 0.6 cm. A small developing collection is seen between the meta
tarsal heads of the fourth and fifth rays, possibly representing fibrous change/early abscess. Again 
extensive locules of air throughout the subcutaneous tissues, concerning for gas-forming organism.



ADDITIONAL FINDINGS: None.



IMPRESSION:

1. Extensive soft tissue infection of the forefoot with relatively diffuse edema and postcontrast enh
ancement, with extensive scattered locules of air. Findings are consistent with diffuse cellulitis wi
th potential necrotizing fasciitis.

2. Osteomyelitis of the phalanges of the second and third toes.

3. Intermetatarsal abscess measuring up to 2.8 cm between the third and fourth rays.

4. Phlegmonous change/developing abscess between the fourth and fifth metatarsal heads.





==================================================================

Report dictated by: Joselito White MD 

Report dictated on: 9/13/2022 12:19 PM



I have reviewed the images, agree with this report, and edited this report as needed.

 



Signer Name: KADEN Diaz MD 

Signed: 9/13/2022 5:30 PM

Workstation Name: [id number]

## 2022-09-13 NOTE — VASCULAR LAB REPORT
DUPLEX DOPPLER UPPER EXTREMITY VENOUS, RIGHT



INDICATION / CLINICAL INFORMATION: severe pain after IV placement.



TECHNIQUE: Duplex doppler imaging was performed through the veins of the right upper extremity using 
venous compression and other maneuvers.



COMPARISON: None available.



FINDINGS:



RIGHT INTERNAL JUGULAR VEIN: Negative.

RIGHT SUBCLAVIAN VEIN: Negative.

RIGHT AXILLARY VEIN: Negative.

RIGHT BRACHIAL VEIN: Negative.

RIGHT FOREARM VEINS: Negative.

RIGHT BASILIC VEIN (SUPERFICIAL): Negative.



ADDITIONAL FINDINGS: Occlusive superficial thrombus is visualized in the cephalic vein.



IMPRESSION:

1. No sonographic evidence for DVT.  

2. Occlusive superficial thrombus within the right cephalic vein.



Scribed by: Amber Bardales RDMS, KATARINA, KEISHA 

Scribed: 9/13/2022 11:24 AM 



 I have reviewed the images, agree with this report, and edited this report as needed.



Signer Name: Олег Sun MD 

Signed: 9/13/2022 12:38 PM

Workstation Name: [email or web address]

## 2022-09-13 NOTE — VASCULAR LAB REPORT
DUPLEX DOPPLER UPPER EXTREMITY ARTERIAL, RIGHT



INDICATION / CLINICAL INFORMATION: severe pain after IV placement.



TECHNIQUE: Arterial duplex examination of the right upper extremity performed using B-mode, color wendy
w and spectral Doppler assessment.



FINDINGS:



RIGHT:

-Subclavian:  cm/sec. Triphasic waveform.

- Axillary:  cm/sec.  Biphasic waveform.

-Brachial:  cm/sec.  Triphasic waveform.

 proximal Radial: PSV 87 cm/sec.  Biphasic waveform.

-distal Radial:  cm/sec. Monophasic waveform.

-proximal Ulnar:  cm/sec.  Triphasic waveform.

-distal ulnar:  cm/sec. Monophasic waveform.



ADDITIONAL FINDINGS: None.



IMPRESSION:

1. Arterial structures of the right upper extremity are patent as noted above. There is however monop
hasic wave forms in the distal radial and ulnar arteries. The remaining arteries demonstrate multipha
sic flow.





======================

Doppler Waveform: 

======================

- Triphasic is normal. 

- Biphasic is abnormal if clear transition from triphasic signal along vascular tree.

- Monophasic is abnormal.







Signer Name: Jesus Ovalle Jr, MD 

Signed: 9/13/2022 11:59 AM

Workstation Name: [id number]

## 2022-09-13 NOTE — OPERATIVE REPORT
Operative Report


Operative Report: 





Date of procedure: 9/13/2022





Preop diagnosis: Left foot third toe osteomyelitis and soft tissue infection





Postop diagnosis: Same with extension of soft tissue infection to include the 

fourth toe





Procedure: Amputation of left third toe





Surgeon: Dr. Griffin





Anesthesia: LMA 





Specimen: Distal phalanx





Estimated blood loss: Minimal





Findings: Patient is taken to the OR where under LMA anesthesia timeouts 

completed consents on the chart.





The left foot is prepped with Betadine and draped in a sterile fashion:





A teardrop shaped incision is used to expose the joint space between the middle 

and distal phalanx.  The toe amputation is completed through the joint space.  

Extensive soft tissue necrosis is noted circumferentially around the third and 

fourth toes.





The metatarsal phalangeal joint is entered and used to have as the level of 

amputation.  Bone cutter is used to trim the cartilage from the distal 

metatarsal head.





The wound is irrigated with copious amounts of Betadine and saline.  It is then 

packed with iodoform gauze.





Patient tolerated the procedure well.  A sterile dressing is placed over this.

## 2022-09-13 NOTE — PROGRESS NOTE
Assessment and Plan


Assessment and plan: 





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


Diabetic foot infection with ulceration of left third digit


Osteomyelitis of left foot


Peripheral arterial disease of bilateral lower extremities


-Visualized on x-ray of left foot and CT of left lower extremity. Pending MRI 

left foot


WBC 27-->21-->17.9


Continue cefepime 1 g every 8 hours and IV linezolid 600 mg every 12 hours.  

Status post aggressive IV fluid resuscitation.


Infectious disease consulted; appreciate recs


Vascular surgery consulted; appreciate recs.  Planning for revascularization on

2022.


General surgery consulted; appreciate recs.  Plan for amputation on 2022.


Continue to monitor





Insulin dependent type II diabetes mellitus with hyperglycemiaimproving


Worsened by diabetic foot infection


- hemoglobin A1c: 6.5


- home regimen: Unknown


- current regimen: NPH 25 units twice daily + moderate SSI +8 units regular 

insulin with meals


- blood glucose goal 140-180 while inpatient


- continue to monitor





Pseudohyponatremiaresolved


Sodium 129 (corrected 136)





JASMEET on likely CKD stage IIIresolved


Creatinine 1.7-->0.9 (baseline unknown)


Status post aggressive IV fluid resuscitation


Renally dose meds and avoid nephrotoxic drugs.


Monitor with repeat BMP tomorrow.  Consider nephrology consult showed 

creatinine 1 continue to increase.





Hypertension


- home medications: Unknown


- current medications: Started nifedipine 30 mg daily


- SBP goal <160 and DBP goal <90 while inpatient


- continue to monitor





Iron deficiency anemia


anemia of chronic disease


Hemoglobin 8.2


 Iron 16, TIBC 133, ferritin 2880.  Transfuse if hemoglobin <7





Chronic thrombocytopenia


Platelets 31-->27-->47--41 (status post transfusion of 1 platelet on 2022)


Patient endorses being aware but not having etiology determined by other 

clinicians.


 Pending transfusion of 1 additional jumbo platelet for upcoming amputation by 

general surgery.





#Moderate protein caloric malnutrition


Albumin 3.6


Will initiate dietary supplementation once hyperglycemia is under control.





History


Interval history: 





No new issues overnight





Hospitalist Physical





- Constitutional


Vitals: 


                                        











Temp Pulse Resp BP Pulse Ox


 


 98.6 F   117 H  16   162/67   97 


 


 22 08:07  22 08:10  22 09:26  22 08:10  22 08:10











General appearance: Present: no acute distress, well-nourished, malodorous 

(Malodorous wet gangrene of left foot with ulceration of third digit)





- EENT


Eyes: Present: PERRL, EOM intact


ENT: hearing intact, clear oral mucosa, dentition normal





- Neck


Neck: Present: supple, normal ROM





- Respiratory


Respiratory effort: normal


Respiratory: bilateral: CTA





- Cardiovascular


Rhythm: regular


Heart Sounds: Present: S1 & S2.  Absent: gallop, rub





- Extremities


Extremities: no ischemia, No edema, Full ROM





- Abdominal


General gastrointestinal: soft, non-tender, non-distended, normal bowel sounds





- Integumentary


Integumentary: Present: clear, warm, dry





- Neurologic


Neurologic: CNII-XII intact, moves all extremities





Results





- Labs


CBC & Chem 7: 


                                 22 05:10





                                 22 05:10


Labs: 


                             Laboratory Last Values











WBC  25.0 K/mm3 (4.5-11.0)  H  22  05:10    


 


RBC  2.51 M/mm3 (3.65-5.03)  L  22  05:10    


 


Hgb  8.1 gm/dl (11.8-15.2)  L  22  05:10    


 


Hct  24.9 % (35.5-45.6)  L  22  05:10    


 


MCV  99 fl (84-94)  H  22  05:10    


 


MCH  32 pg (28-32)   22  05:10    


 


MCHC  32 % (32-34)   22  05:10    


 


RDW  13.3 % (13.2-15.2)   22  05:10    


 


Plt Count  41 K/mm3 (140-440)  L  22  05:10    


 


Lymph % (Auto)  16.2 % (13.4-35.0)   22  Unknown


 


Mono % (Auto)  11.9 % (0.0-7.3)  H  22  Unknown


 


Eos % (Auto)  0.7 % (0.0-4.3)   22  Unknown


 


Baso % (Auto)  0.5 % (0.0-1.8)   22  Unknown


 


Lymph # (Auto)  2.9 K/mm3 (1.2-5.4)   22  Unknown


 


Mono # (Auto)  2.1 K/mm3 (0.0-0.8)  H  22  Unknown


 


Eos # (Auto)  0.1 K/mm3 (0.0-0.4)   22  Unknown


 


Baso # (Auto)  0.1 K/mm3 (0.0-0.1)   22  Unknown


 


Add Manual Diff  Complete   22  05:10    


 


Total Counted  100   22  05:10    


 


Seg Neutrophils %  70.7 % (40.0-70.0)  H  22  Unknown


 


Seg Neuts % (Manual)  91.0 % (40.0-70.0)  H  22  05:10    


 


Band Neutrophils %  0 %  22  05:10    


 


Lymphocytes % (Manual)  6.0 % (13.4-35.0)  L  22  05:10    


 


Reactive Lymphs % (Man)  0 %  22  05:10    


 


Monocytes % (Manual)  3.0 % (0.0-7.3)   22  05:10    


 


Eosinophils % (Manual)  0 % (0.0-4.3)   22  05:10    


 


Basophils % (Manual)  0 % (0.0-1.8)   22  05:10    


 


Metamyelocytes %  0 %  22  05:10    


 


Myelocytes %  0 %  22  05:10    


 


Promyelocytes %  0 %  22  05:10    


 


Blast Cells %  0 %  22  05:10    


 


Nucleated RBC %  Not Reportable   22  05:10    


 


Seg Neutrophils #  12.6 K/mm3 (1.8-7.7)  H  22  Unknown


 


Seg Neutrophils # Man  22.8 K/mm3 (1.8-7.7)  H  22  05:10    


 


Band Neutrophils #  0.0 K/mm3  22  05:10    


 


Lymphocytes # (Manual)  1.5 K/mm3 (1.2-5.4)   22  05:10    


 


Abs React Lymphs (Man)  0.0 K/mm3  22  05:10    


 


Monocytes # (Manual)  0.8 K/mm3 (0.0-0.8)   22  05:10    


 


Eosinophils # (Manual)  0.0 K/mm3 (0.0-0.4)   22  05:10    


 


Basophils # (Manual)  0.0 K/mm3 (0.0-0.1)   22  05:10    


 


Metamyelocytes #  0.0 K/mm3  22  05:10    


 


Myelocytes #  0.0 K/mm3  22  05:10    


 


Promyelocytes #  0.0 K/mm3  22  05:10    


 


Blast Cells #  0.0 K/mm3  22  05:10    


 


WBC Morphology  Not Reportable   22  05:10    


 


Hypersegmented Neuts  Not Reportable   22  05:10    


 


Hyposegmented Neuts  Not Reportable   22  05:10    


 


Hypogranular Neuts  Not Reportable   22  05:10    


 


Smudge Cells  Not Reportable   22  05:10    


 


Toxic Granulation  Not Reportable   22  05:10    


 


Toxic Vacuolation  Not Reportable   22  05:10    


 


Dohle Bodies  Not Reportable   22  05:10    


 


Pelger-Huet Anomaly  Not Reportable   22  05:10    


 


Olive Rods  Not Reportable   22  05:10    


 


Platelet Estimate  Consistent w auto   22  05:10    


 


Clumped Platelets  Not Reportable   22  05:10    


 


Plt Clumps, EDTA  Not Reportable   22  05:10    


 


Large Platelets  Not Reportable   22  05:10    


 


Giant Platelets  Not Reportable   22  05:10    


 


Platelet Satelliting  Not Reportable   22  05:10    


 


Plt Morphology Comment  Platelet clumping   22  05:10    


 


RBC Morphology  Not Reportable   22  05:10    


 


Dimorphic RBCs  Not Reportable   22  05:10    


 


Polychromasia  Not Reportable   22  05:10    


 


Hypochromasia  Not Reportable   22  05:10    


 


Poikilocytosis  Not Reportable   22  05:10    


 


Anisocytosis  Not Reportable   22  05:10    


 


Microcytosis  Not Reportable   22  05:10    


 


Macrocytosis  Not Reportable   22  05:10    


 


Spherocytes  Not Reportable   22  05:10    


 


Pappenheimer Bodies  Not Reportable   22  05:10    


 


Sickle Cells  Not Reportable   22  05:10    


 


Target Cells  Not Reportable   22  05:10    


 


Tear Drop Cells  Not Reportable   22  05:10    


 


Ovalocytes  Not Reportable   22  05:10    


 


Helmet Cells  Not Reportable   22  05:10    


 


S[place] Bodies  Not Reportable   22  05:10    


 


Cabot Rings  Not Reportable   22  05:10    


 


[place] Cells  Not Reportable   22  05:10    


 


Bite Cells  Not Reportable   22  05:10    


 


Crenated Cell  Not Reportable   22  05:10    


 


Elliptocytes  Not Reportable   22  05:10    


 


Acanthocytes (Spur)  Not Reportable   22  05:10    


 


Rouleaux  Not Reportable   22  05:10    


 


Hemoglobin C Crystals  Not Reportable   22  05:10    


 


Schistocytes  Not Reportable   22  05:10    


 


Malaria parasites  Not Reportable   22  05:10    


 


Tej Bodies  Not Reportable   22  05:10    


 


Hem Pathologist Commnt  No   22  05:10    


 


PT  14.5 Sec. (12.2-14.9)   22  Unknown


 


INR  0.99  (0.87-1.13)   22  Unknown


 


Sodium  139 mmol/L (137-145)   22  05:10    


 


Potassium  4.0 mmol/L (3.6-5.0)   22  05:10    


 


Chloride  99.7 mmol/L ()   22  05:10    


 


Carbon Dioxide  26 mmol/L (22-30)   22  05:10    


 


Anion Gap  17 mmol/L  22  05:10    


 


BUN  14 mg/dL (9-20)   22  05:10    


 


Creatinine  0.9 mg/dL (0.8-1.3)   22  05:10    


 


Estimated GFR  > 60 ml/min  22  05:10    


 


BUN/Creatinine Ratio  16 %  22  05:10    


 


Glucose  197 mg/dL ()  H  22  05:10    


 


POC Glucose  380 mg/dL ()  H  22  20:41    


 


Hemoglobin A1c  6.5 % (4-6)  H  09/10/22  08:41    


 


Lactic Acid  1.50 mmol/L (0.7-2.0)   09/10/22  02:26    


 


Calcium  8.5 mg/dL (8.4-10.2)   22  05:10    


 


Iron  16 ug/dL ()  L  22  05:35    


 


TIBC  133 mcg/dL (250-450)  L  22  05:35    


 


Ferritin  2888.0 ng/mL (30.0-300.0)  H  22  05:35    


 


Total Bilirubin  0.70 mg/dL (0.1-1.2)   09/10/22  02:26    


 


AST  41 units/L (5-40)  H  09/10/22  02:26    


 


ALT  28 units/L (7-56)   09/10/22  02:26    


 


Alkaline Phosphatase  129 units/L ()   09/10/22  02:26    


 


Total Creatine Kinase  816 units/L ()  H  22  05:10    


 


Total Protein  6.9 g/dL (6.3-8.2)   09/10/22  02:26    


 


Albumin  3.6 g/dL (3.9-5)  L  09/10/22  02:26    


 


Albumin/Globulin Ratio  1.1 %  09/10/22  02:26    


 


Triglycerides  114 mg/dL (2-149)   09/10/22  08:41    


 


Cholesterol  116 mg/dL ()   09/10/22  08:41    


 


LDL Cholesterol Direct  46 mg/dL ()  L  09/10/22  08:41    


 


HDL Cholesterol  37 mg/dL (40-59)  L  09/10/22  08:41    


 


Cholesterol/HDL Ratio  3.13 %  09/10/22  08:41    


 


Blood Type  O POSITIVE   22  Unknown











Microbiology: 


Microbiology





09/10/22 02:26   Peripheral/Venous   Blood Culture - Preliminary


                            NO GROWTH AFTER 72 HOURS


09/10/22 02:26   Peripheral/Venous   Blood Culture - Preliminary


                            NO GROWTH AFTER 72 HOURS








Toney/IV: 


                                        





Voiding Method                   Condom Catheter











Active Medications





- Current Medications


Current Medications: 














Generic Name Dose Route Start Last Admin





  Trade Name Freq  PRN Reason Stop Dose Admin


 


Acetaminophen  650 mg  09/10/22 08:30  22 01:31





  Acetaminophen 325 Mg Tab  PO   650 mg





  Q4H PRN   Administration





  Pain MILD(1-3)/Fever >100.5/HA  


 


Atorvastatin Calcium  40 mg  22 22:00  22 22:51





  Atorvastatin 40 Mg Tab  PO   40 mg





  QHS ELVIE   Administration


 


Cilostazol  100 mg  22 12:00  22 09:28





  Cilostazol 100 Mg Tab  PO   100 mg





  BID ELVIE   Administration


 


Clopidogrel Bisulfate  75 mg  22 10:00  22 09:46





  Clopidogrel 75 Mg Tab  PO   Not Given





  QDAY ELVIE  


 


Dextrose  50 ml  09/10/22 08:35 





  Dextrose 50% In Water (25gm) 50 Ml Syringe  IV  





  Q30MIN PRN  





  Hypoglycemia  





  Protocol  


 


Heparin Sodium (Porcine)  5,000 unit  09/10/22 14:00  22 05:39





  Heparin 5,000 Unit/1 Ml Vial  SUB-Q   Not Given





  Q8HR [facility]  


 


Sodium Chloride  1,000 mls @ 100 mls/hr  09/10/22 05:30  22 15:35





  Nacl 0.9% 1000 Ml  IV   100 mls/hr





  AS DIRECT ELVIE   Administration


 


Cefepime HCl  1 gm in 100 mls @ 200 mls/hr  09/10/22 09:00  22 01:00





  Cefepime/Ns 1 Gm/100 Ml  IV   200 mls/hr





  Q8H ELVIE   Administration





  Protocol  


 


Daptomycin 500 mg/ Sodium  100 mls @ 200 mls/hr  22 18:00 





  Chloride  IV  





  Q24H [facility]  





  Protocol  


 


Insulin Human Isoph/Insulin Regular  25 unit  09/10/22 19:16  22 09:29





  Insulin Nph/Regular 70/30 Inj  SUB-Q   Not Given





  BIDDIAB ELVIE  


 


Insulin Human Regular  5 units  09/10/22 22:00  22 09:30





  Insulin Regular, Human 100 Units/1 Ml  SUB-Q   Not Given





  ACHS [facility]  


 


Insulin Human Regular  0 units  22 00:00  22 06:17





  Insulin Regular, Human 100 Units/1 Ml  SUB-Q   2 units





  Q6H ELVIE   Administration





  Protocol  


 


Metoclopramide HCl  10 mg  22 17:34  22 22:50





  Metoclopramide 10 Mg/2 Ml Inj  IV   10 mg





  Q6H PRN   Administration





  Nausea And Vomiting  


 


Miscellaneous Medication  40 mg  22 10:00 





  Atorvastatin  PO  





  DAILY ELVIE  


 


Miscellaneous Medication  100 mg  22 10:00 





  Metoprolol  PO  





  BID ELVIE  


 


Morphine Sulfate  2 mg  09/10/22 08:30  22 20:04





  Morphine 4 Mg/1 Ml Inj  IV   2 mg





  Q4H PRN   Administration





  Pain , Severe (7-10)  


 


Nifedipine  30 mg  22 10:00  22 09:26





  Nifedipine Xl 30 Mg Tab  PO   30 mg





  QDAY ELVIE   Administration


 


Ondansetron HCl  4 mg  09/10/22 08:30 





  Ondansetron 4 Mg/2 Ml Inj  IV  





  Q8H PRN  





  Nausea And Vomiting  


 


Oxycodone/Acetaminophen  1 tab  09/10/22 08:30  22 09:26





  Oxycodone /Acetaminophen 5-325mg Tab  PO   1 tab





  Q6H PRN   Administration





  Pain, Moderate (4-6)  


 


Povidone Iodine  1 applic  09/10/22 15:00  22 09:29





  Povidone-Iodine Ointment 28.35 Gm  TP   1 applic





  DAILY ELVIE   Administration


 


Sodium Chloride  10 ml  09/10/22 10:00  22 09:29





  Sodium Chloride 0.9% 10 Ml Flush Syringe  IV   10 ml





  BID ELVIE   Administration


 


Sodium Chloride  10 ml  09/10/22 08:30 





  Sodium Chloride 0.9% 10 Ml Flush Syringe  IV  





  PRN PRN  





  LINE FLUSH  














Nutrition/Malnutrition Assess





- Dietary Evaluation


Nutrition/Malnutrition Findings: 


                                        





Nutrition Notes                                            Start:  22 

11:33


Freq:                                                      Status: Active       




Protocol:                                                                       




 Document     22 11:33  KERRIE  (Rec: 22 11:52  KERRIE  [id number])


 Nutrition Notes


     Need for Assessment generated from:         RN Referral


     Initial or Follow up                        Brief Note


     Current Diagnosis                           Diabetes


     Other Pertinent Diagnosis                   Infected (L) diabetic foot, (L


                                                 ) foot osteomyelitis


     Current Diet                                Consistent CHO


     Labs/Tests                                  BUN 29


                                                 


                                                 Iron 16


                                                 TIBC 133


                                                 A1C 6.5


     Pertinent Medications                       NS at 100ml/hr


     Height                                      5 ft 8 in


     Weight                                      72 kg


     Ideal Body Weight (kg)                      70.00


     BMI                                         24.1


     Weight Status                               Appropriate


     Subjective/Other Information                Pt screened for skin risk


                                                 assessment (Sid score: 18).


                                                 He consumed 50% of dinner


                                                 last pm; he is able to feed


                                                 himself.  Revascularization


                                                 procedure scheduled for


                                                 tomorrow; pt will be NPO after


                                                 midnight tonight.  PMHx


                                                 includes PVD (s/p RLE bypass


                                                 10+ yrs ago), (R) foot


                                                 transmetatarsal amputation.


     Burn                                        Absent


     Trauma                                      Absent


     Current % PO                                Fair (50-74%)


     Minimum of two criteria                     No


     Reduced  Strength                       Measurably Reduced (severe)


     Is patient on ventilator?                   No


     Is Patient Ambulatory and/or Out of Bed     No


     REE-([facility]-confined to bed)      [phone]


     Calculation Used for Recommendations        [facility]


     Additional Notes                            Pro needs 1.25-1.5g/k-


                                                 108g/day


                                                 Fluid needs 1ml/kcal


 Nutrition Intervention


     Follow-Up By:                               09/15/22


     Additional Comments                         F/U: intakes, need for ONS

## 2022-09-13 NOTE — ANESTHESIA DAY OF SURGERY
Anesthesia Day of Surgery





- Day of Surgery


Patient Examined: Yes


Patient H&P Reviewed: Yes


Patient is NPO: Yes (>24 hours)

## 2022-09-14 LAB
%HYPO/RBC NFR BLD AUTO: (no result) %
BAND NEUTROPHILS # (MANUAL): 0 K/MM3
BUN SERPL-MCNC: 13 MG/DL (ref 9–20)
BUN/CREAT SERPL: 14 %
CALCIUM SERPL-MCNC: 8.3 MG/DL (ref 8.4–10.2)
HCT VFR BLD CALC: 24.7 % (ref 35.5–45.6)
HEMOLYSIS INDEX: 3
HGB BLD-MCNC: 7.9 GM/DL (ref 11.8–15.2)
MCHC RBC AUTO-ENTMCNC: 32 % (ref 32–34)
MCV RBC AUTO: 100 FL (ref 84–94)
MYELOCYTES # (MANUAL): 0 K/MM3
PLATELET # BLD: 51 K/MM3 (ref 140–440)
PROMYELOCYTES # (MANUAL): 0 K/MM3
RBC # BLD AUTO: 2.48 M/MM3 (ref 3.65–5.03)
TOTAL CELLS COUNTED BLD: 100

## 2022-09-14 PROCEDURE — 05HA33Z INSERTION OF INFUSION DEVICE INTO LEFT BRACHIAL VEIN, PERCUTANEOUS APPROACH: ICD-10-PCS | Performed by: STUDENT IN AN ORGANIZED HEALTH CARE EDUCATION/TRAINING PROGRAM

## 2022-09-14 RX ADMIN — HEPARIN SODIUM SCH: 5000 INJECTION, SOLUTION INTRAVENOUS; SUBCUTANEOUS at 14:10

## 2022-09-14 RX ADMIN — CLOPIDOGREL BISULFATE SCH MG: 75 TABLET ORAL at 10:22

## 2022-09-14 RX ADMIN — METOPROLOL TARTRATE SCH MG: 100 TABLET, FILM COATED ORAL at 10:22

## 2022-09-14 RX ADMIN — MORPHINE SULFATE PRN MG: 4 INJECTION, SOLUTION INTRAMUSCULAR; INTRAVENOUS at 21:14

## 2022-09-14 RX ADMIN — HEPARIN SODIUM SCH UNIT: 5000 INJECTION, SOLUTION INTRAVENOUS; SUBCUTANEOUS at 21:22

## 2022-09-14 RX ADMIN — MORPHINE SULFATE PRN MG: 4 INJECTION, SOLUTION INTRAMUSCULAR; INTRAVENOUS at 04:09

## 2022-09-14 RX ADMIN — INSULIN HUMAN SCH: 100 INJECTION, SOLUTION PARENTERAL at 13:56

## 2022-09-14 RX ADMIN — CILOSTAZOL SCH MG: 100 TABLET ORAL at 21:13

## 2022-09-14 RX ADMIN — CEFEPIME SCH MLS/HR: 1 INJECTION, POWDER, FOR SOLUTION INTRAMUSCULAR; INTRAVENOUS at 01:46

## 2022-09-14 RX ADMIN — INSULIN HUMAN SCH: 100 INJECTION, SUSPENSION SUBCUTANEOUS at 17:58

## 2022-09-14 RX ADMIN — INSULIN HUMAN SCH UNIT: 100 INJECTION, SUSPENSION SUBCUTANEOUS at 10:23

## 2022-09-14 RX ADMIN — INSULIN HUMAN SCH: 100 INJECTION, SOLUTION PARENTERAL at 07:51

## 2022-09-14 RX ADMIN — CILOSTAZOL SCH MG: 100 TABLET ORAL at 10:22

## 2022-09-14 RX ADMIN — NIFEDIPINE SCH MG: 30 TABLET, FILM COATED, EXTENDED RELEASE ORAL at 10:22

## 2022-09-14 RX ADMIN — POVIDONE-IODINE SCH APPLIC: 100 OINTMENT TOPICAL at 10:23

## 2022-09-14 RX ADMIN — INSULIN HUMAN SCH: 100 INJECTION, SOLUTION PARENTERAL at 10:23

## 2022-09-14 RX ADMIN — Medication SCH ML: at 21:42

## 2022-09-14 RX ADMIN — SODIUM CHLORIDE SCH MLS/HR: 0.9 INJECTION, SOLUTION INTRAVENOUS at 17:04

## 2022-09-14 RX ADMIN — METOPROLOL TARTRATE SCH MG: 100 TABLET, FILM COATED ORAL at 21:14

## 2022-09-14 RX ADMIN — HEPARIN SODIUM SCH UNIT: 5000 INJECTION, SOLUTION INTRAVENOUS; SUBCUTANEOUS at 05:22

## 2022-09-14 RX ADMIN — CEFEPIME SCH MLS/HR: 1 INJECTION, POWDER, FOR SOLUTION INTRAMUSCULAR; INTRAVENOUS at 10:24

## 2022-09-14 RX ADMIN — INSULIN HUMAN SCH: 100 INJECTION, SOLUTION PARENTERAL at 17:57

## 2022-09-14 RX ADMIN — INSULIN HUMAN SCH: 100 INJECTION, SOLUTION PARENTERAL at 18:27

## 2022-09-14 RX ADMIN — INSULIN HUMAN SCH UNITS: 100 INJECTION, SOLUTION PARENTERAL at 13:45

## 2022-09-14 RX ADMIN — Medication SCH ML: at 10:28

## 2022-09-14 RX ADMIN — SODIUM CHLORIDE SCH MLS/HR: 0.9 INJECTION, SOLUTION INTRAVENOUS at 17:02

## 2022-09-14 RX ADMIN — INSULIN HUMAN SCH UNITS: 100 INJECTION, SOLUTION PARENTERAL at 00:18

## 2022-09-14 NOTE — PROGRESS NOTE
Assessment and Plan


Assessment and plan: 


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


Diabetic foot infection with ulceration of left third digit


Osteomyelitis of left foot


Peripheral arterial disease of bilateral lower extremities


-Visualized on x-ray of left foot and CT of left lower extremity. Pending MRI 

left foot


WBC 27-->21-->17.9


Continue cefepime 1 g every 8 hours and IV linezolid 600 mg every 12 hours.  

Status post aggressive IV fluid resuscitation.


Infectious disease consulted; appreciate recs


Vascular surgery consulted; appreciate recs.  Planning for revascularization on

2022.


General surgery consulted; appreciate recs.  Plan for amputation on 2022.


Amputation complete.  Tolerated procedure well.  Follow further recommendations 

per surgery and ID.


Continue to monitor





Insulin dependent type II diabetes mellitus with hyperglycemiaimproving


Worsened by diabetic foot infection


- hemoglobin A1c: 6.5


- home regimen: Unknown


- current regimen: NPH 25 units twice daily + moderate SSI +8 units regular 

insulin with meals


- blood glucose goal 140-180 while inpatient


- continue to monitor





Pseudohyponatremiaresolved


Sodium 129 (corrected 136)





JASMEET on likely CKD stage IIIresolved


Creatinine 1.7-->0.9 (baseline unknown)


Resolved.  Patient back at baseline.


Status post aggressive IV fluid resuscitation


Renally dose meds and avoid nephrotoxic drugs.


Monitor with repeat BMP tomorrow.  Consider nephrology consult showed 

creatinine 1 continue to increase.





Hypertension


- home medications: Unknown


Suboptimal control.  Will increase nifedipine to 60.


- current medications: Started nifedipine 30 mg daily increased to 60 mg.


- SBP goal <160 and DBP goal <90 while inpatient


- continue to monitor





Iron deficiency anemia


anemia of chronic disease


Hemoglobin 8.2


 Iron 16, TIBC 133, ferritin 2880.  Transfuse if hemoglobin <7





Chronic thrombocytopenia


Platelets 31-->27-->47--41 (status post transfusion of 1 platelet on 2022)


Patient endorses being aware but not having etiology determined by other 

clinicians.


 Pending transfusion of 1 additional jumbo platelet for upcoming amputation by 

general surgery.





#Moderate protein caloric malnutrition


Albumin 3.6


Will initiate dietary supplementation once hyperglycemia is under control.








History


Interval history: 


Presents with sepsis.  Peripheral vascular disease.  Revascularization procedure

2022.  Patient septic with a white count of 27.


Patient feels much better today denies pain.  Surgical procedure amputation went

well.  Patient remains on cefepime and daptomycin.


Improvement of platelets from 41-51 over p.m.  Tolerated surgical procedure 

well.  No fever or chills.  No pain.  Area bandaged.





Hospitalist Physical





- Constitutional


Vitals: 


                                        











Temp Pulse Resp BP Pulse Ox


 


 98.4 F   106 H  18   144/57   94 


 


 22 05:31  22 05:31  22 05:31  22 05:31  22 05:31











General appearance: Present: no acute distress, well-nourished, malodorous 

(Malodorous wet gangrene of left foot with ulceration of third digit)





- EENT


Eyes: Present: PERRL, EOM intact


ENT: clear oral mucosa, dentition normal





- Neck


Neck: Present: supple, normal ROM





- Respiratory


Respiratory: bilateral: CTA





- Cardiovascular


Rhythm: regular





- Extremities


Extremities: no ischemia, pulses intact


Extremity abnormal: other (Foot bandaged.  Not evaluated today.  Surgical 

dressing.)


Peripheral Pulses: within normal limits





- Abdominal


General gastrointestinal: soft, non-tender, non-distended





- Psychiatric


Psychiatric: appropriate mood/affect, cooperative





- Neurologic


Neurologic: CNII-XII intact, focal deficits





Results





- Labs


CBC & Chem 7: 


                                 22 06:04





                                 22 06:04


Labs: 


                             Laboratory Last Values











WBC  27.9 K/mm3 (4.5-11.0)  H  22  06:04    


 


RBC  2.48 M/mm3 (3.65-5.03)  L  22  06:04    


 


Hgb  7.9 gm/dl (11.8-15.2)  L  22  06:04    


 


Hct  24.7 % (35.5-45.6)  L  22  06:04    


 


MCV  100 fl (84-94)  H  22  06:04    


 


MCH  32 pg (28-32)   22  06:04    


 


MCHC  32 % (32-34)   22  06:04    


 


RDW  13.3 % (13.2-15.2)   22  06:04    


 


Plt Count  51 K/mm3 (140-440)  L  22  06:04    


 


Lymph % (Auto)  16.2 % (13.4-35.0)   22  Unknown


 


Mono % (Auto)  11.9 % (0.0-7.3)  H  22  Unknown


 


Eos % (Auto)  0.7 % (0.0-4.3)   22  Unknown


 


Baso % (Auto)  0.5 % (0.0-1.8)   22  Unknown


 


Lymph # (Auto)  2.9 K/mm3 (1.2-5.4)   22  Unknown


 


Mono # (Auto)  2.1 K/mm3 (0.0-0.8)  H  22  Unknown


 


Eos # (Auto)  0.1 K/mm3 (0.0-0.4)   22  Unknown


 


Baso # (Auto)  0.1 K/mm3 (0.0-0.1)   22  Unknown


 


Add Manual Diff  Complete   22  06:04    


 


Total Counted  100   22  06:04    


 


Seg Neutrophils %  70.7 % (40.0-70.0)  H  22  Unknown


 


Seg Neuts % (Manual)  82.0 % (40.0-70.0)  H  22  06:04    


 


Band Neutrophils %  0 %  22  06:04    


 


Lymphocytes % (Manual)  8.0 % (13.4-35.0)  L  22  06:04    


 


Reactive Lymphs % (Man)  0 %  22  06:04    


 


Monocytes % (Manual)  10.0 % (0.0-7.3)  H  22  06:04    


 


Eosinophils % (Manual)  0 % (0.0-4.3)   22  06:04    


 


Basophils % (Manual)  0 % (0.0-1.8)   22  06:04    


 


Metamyelocytes %  0 %  22  06:04    


 


Myelocytes %  0 %  22  06:04    


 


Promyelocytes %  0 %  22  06:04    


 


Blast Cells %  0 %  22  06:04    


 


Nucleated RBC %  Not Reportable   22  06:04    


 


Seg Neutrophils #  12.6 K/mm3 (1.8-7.7)  H  22  Unknown


 


Seg Neutrophils # Man  22.9 K/mm3 (1.8-7.7)  H  22  06:04    


 


Band Neutrophils #  0.0 K/mm3  22  06:04    


 


Lymphocytes # (Manual)  2.2 K/mm3 (1.2-5.4)   22  06:04    


 


Abs React Lymphs (Man)  0.0 K/mm3  22  06:04    


 


Monocytes # (Manual)  2.8 K/mm3 (0.0-0.8)  H  22  06:04    


 


Eosinophils # (Manual)  0.0 K/mm3 (0.0-0.4)   22  06:04    


 


Basophils # (Manual)  0.0 K/mm3 (0.0-0.1)   22  06:04    


 


Metamyelocytes #  0.0 K/mm3  22  06:04    


 


Myelocytes #  0.0 K/mm3  22  06:04    


 


Promyelocytes #  0.0 K/mm3  22  06:04    


 


Blast Cells #  0.0 K/mm3  22  06:04    


 


WBC Morphology  Not Reportable   22  06:04    


 


Hypersegmented Neuts  Not Reportable   22  06:04    


 


Hyposegmented Neuts  Not Reportable   22  06:04    


 


Hypogranular Neuts  Not Reportable   22  06:04    


 


Smudge Cells  Not Reportable   22  06:04    


 


Toxic Granulation  Not Reportable   22  06:04    


 


Toxic Vacuolation  Not Reportable   22  06:04    


 


Dohle Bodies  Not Reportable   22  06:04    


 


Pelger-Huet Anomaly  Not Reportable   22  06:04    


 


Olive Rods  Not Reportable   22  06:04    


 


Platelet Estimate  Consistent w auto   22  06:04    


 


Clumped Platelets  Not Reportable   22  06:04    


 


Plt Clumps, EDTA  Not Reportable   22  06:04    


 


Large Platelets  Not Reportable   22  06:04    


 


Giant Platelets  Not Reportable   22  06:04    


 


Platelet Satelliting  Not Reportable   22  06:04    


 


Plt Morphology Comment  Not Reportable   22  06:04    


 


RBC Morphology  Not Reportable   22  06:04    


 


Dimorphic RBCs  Not Reportable   22  06:04    


 


Polychromasia  Not Reportable   22  06:04    


 


Hypochromasia  1+   22  06:04    


 


Poikilocytosis  Not Reportable   22  06:04    


 


Anisocytosis  Not Reportable   22  06:04    


 


Microcytosis  Not Reportable   22  06:04    


 


Macrocytosis  Not Reportable   22  06:04    


 


Spherocytes  Not Reportable   22  06:04    


 


Pappenheimer Bodies  Not Reportable   22  06:04    


 


Sickle Cells  Not Reportable   22  06:04    


 


Target Cells  Not Reportable   22  06:04    


 


Tear Drop Cells  Not Reportable   22  06:04    


 


Ovalocytes  Not Reportable   22  06:04    


 


Helmet Cells  Not Reportable   22  06:04    


 


Soler-Pacheco Bodies  Not Reportable   22  06:04    


 


[place] Rings  Not Reportable   22  06:04    


 


[place] Cells  Not Reportable   22  06:04    


 


Bite Cells  Not Reportable   22  06:04    


 


Crenated Cell  Not Reportable   22  06:04    


 


Elliptocytes  Not Reportable   22  06:04    


 


Acanthocytes (Spur)  Not Reportable   22  06:04    


 


Rouleaux  Not Reportable   22  06:04    


 


Hemoglobin C Crystals  Not Reportable   22  06:04    


 


Schistocytes  Not Reportable   22  06:04    


 


Malaria parasites  Not Reportable   22  06:04    


 


Tej Bodies  Not Reportable   22  06:04    


 


Hem Pathologist Commnt  No   22  06:04    


 


PT  14.5 Sec. (12.2-14.9)   22  Unknown


 


INR  0.99  (0.87-1.13)   22  Unknown


 


Sodium  141 mmol/L (137-145)   22  06:04    


 


Potassium  4.4 mmol/L (3.6-5.0)   22  06:04    


 


Chloride  100.8 mmol/L ()   22  06:04    


 


Carbon Dioxide  23 mmol/L (22-30)   22  06:04    


 


Anion Gap  22 mmol/L  22  06:04    


 


BUN  13 mg/dL (9-20)   22  06:04    


 


Creatinine  0.9 mg/dL (0.8-1.3)   22  06:04    


 


Estimated GFR  > 60 ml/min  22  06:04    


 


BUN/Creatinine Ratio  14 %  22  06:04    


 


Glucose  64 mg/dL ()  L  22  06:04    


 


POC Glucose  250 mg/dL ()  H  22  20:20    


 


Hemoglobin A1c  6.5 % (4-6)  H  09/10/22  08:41    


 


Lactic Acid  1.50 mmol/L (0.7-2.0)   09/10/22  02:26    


 


Calcium  8.3 mg/dL (8.4-10.2)  L  22  06:04    


 


Iron  16 ug/dL ()  L  22  05:35    


 


TIBC  133 mcg/dL (250-450)  L  22  05:35    


 


Ferritin  2888.0 ng/mL (30.0-300.0)  H  22  05:35    


 


Total Bilirubin  0.70 mg/dL (0.1-1.2)   09/10/22  02:26    


 


AST  41 units/L (5-40)  H  09/10/22  02:26    


 


ALT  28 units/L (7-56)   09/10/22  02:26    


 


Alkaline Phosphatase  129 units/L ()   09/10/22  02:26    


 


Total Creatine Kinase  483 units/L ()  H  22  06:04    


 


Total Protein  6.9 g/dL (6.3-8.2)   09/10/22  02:26    


 


Albumin  3.6 g/dL (3.9-5)  L  09/10/22  02:26    


 


Albumin/Globulin Ratio  1.1 %  09/10/22  02:26    


 


Triglycerides  114 mg/dL (2-149)   09/10/22  08:41    


 


Cholesterol  116 mg/dL ()   09/10/22  08:41    


 


LDL Cholesterol Direct  46 mg/dL ()  L  09/10/22  08:41    


 


HDL Cholesterol  37 mg/dL (40-59)  L  09/10/22  08:41    


 


Cholesterol/HDL Ratio  3.13 %  09/10/22  08:41    


 


Blood Type  O POSITIVE   22  Unknown











Microbiology: 


Microbiology





09/10/22 02:26   Peripheral/Venous   Blood Culture - Preliminary


                            NO GROWTH AFTER 4 DAYS


09/10/22 02:26   Peripheral/Venous   Blood Culture - Preliminary


                            NO GROWTH AFTER 4 DAYS








Toney/IV: 


                                        





Voiding Method                   Condom Catheter











Active Medications





- Current Medications


Current Medications: 














Generic Name Dose Route Start Last Admin





  Trade Name Freq  PRN Reason Stop Dose Admin


 


Acetaminophen  650 mg  09/10/22 08:30  22 01:31





  Acetaminophen 325 Mg Tab  PO   650 mg





  Q4H PRN   Administration





  Pain MILD(1-3)/Fever >100.5/HA  


 


Atorvastatin Calcium  40 mg  22 11:00  22 17:39





  Atorvastatin 40 Mg Tab  PO   Not Given





  DAILY ELVIE  


 


Cilostazol  100 mg  22 12:00  22 21:48





  Cilostazol 100 Mg Tab  PO   100 mg





  BID ELVIE   Administration


 


Clopidogrel Bisulfate  75 mg  22 10:00  22 09:46





  Clopidogrel 75 Mg Tab  PO   Not Given





  QDAY ELVIE  


 


Dextrose  50 ml  09/10/22 08:35 





  Dextrose 50% In Water (25gm) 50 Ml Syringe  IV  





  Q30MIN PRN  





  Hypoglycemia  





  Protocol  


 


Heparin Sodium (Porcine)  5,000 unit  09/10/22 14:00  22 05:22





  Heparin 5,000 Unit/1 Ml Vial  SUB-Q   5,000 unit





  Q8HR ELVIE   Administration


 


Sodium Chloride  1,000 mls @ 100 mls/hr  09/10/22 05:30  22 22:00





  Nacl 0.9% 1000 Ml  IV   100 mls/hr





  AS DIRECT ELVIE   Administration


 


Cefepime HCl  1 gm in 100 mls @ 200 mls/hr  09/10/22 09:00  22 04:57





  Cefepime/Ns 1 Gm/100 Ml  IV   Infused





  Q8H ELVIE   Infusion





  Protocol  


 


Daptomycin 500 mg/ Sodium  100 mls @ 200 mls/hr  22 18:00  22 20:37





  Chloride  IV   Infused





  Q24H ELVIE   Infusion





  Protocol  


 


Insulin Human Isoph/Insulin Regular  25 unit  09/10/22 19:16  22 18:52





  Insulin Nph/Regular 70/30 Inj  SUB-Q   25 unit





  BIDDIAB ELVIE   Administration


 


Insulin Human Regular  5 units  09/10/22 22:00  22 23:18





  Insulin Regular, Human 100 Units/1 Ml  SUB-Q   5 units





  ACHS ELVIE   Administration


 


Insulin Human Regular  0 units  22 00:00  22 07:51





  Insulin Regular, Human 100 Units/1 Ml  SUB-Q   Not Given





  Q6H [facility]  





  Protocol  


 


Metoclopramide HCl  10 mg  22 17:34  22 22:50





  Metoclopramide 10 Mg/2 Ml Inj  IV   10 mg





  Q6H PRN   Administration





  Nausea And Vomiting  


 


Metoprolol Tartrate  100 mg  22 11:00  22 21:49





  Metoprolol Tartrate 100 Mg Tab  PO   100 mg





  BID ELVIE   Administration


 


Morphine Sulfate  2 mg  09/10/22 08:30  22 04:09





  Morphine 4 Mg/1 Ml Inj  IV   2 mg





  Q4H PRN   Administration





  Pain , Severe (7-10)  


 


Nifedipine  30 mg  22 10:00  22 09:26





  Nifedipine Xl 30 Mg Tab  PO   30 mg





  QDAY ELVIE   Administration


 


Ondansetron HCl  4 mg  09/10/22 08:30 





  Ondansetron 4 Mg/2 Ml Inj  IV  





  Q8H PRN  





  Nausea And Vomiting  


 


Oxycodone/Acetaminophen  1 tab  09/10/22 08:30  22 21:49





  Oxycodone /Acetaminophen 5-325mg Tab  PO   1 tab





  Q6H PRN   Administration





  Pain, Moderate (4-6)  


 


Povidone Iodine  1 applic  09/10/22 15:00  22 09:29





  Povidone-Iodine Ointment 28.35 Gm  TP   1 applic





  DAILY ELVIE   Administration


 


Sodium Chloride  10 ml  09/10/22 10:00  22 23:19





  Sodium Chloride 0.9% 10 Ml Flush Syringe  IV   10 ml





  BID ELVIE   Administration


 


Sodium Chloride  10 ml  09/10/22 08:30 





  Sodium Chloride 0.9% 10 Ml Flush Syringe  IV  





  PRN PRN  





  LINE FLUSH  














Nutrition/Malnutrition Assess





- Dietary Evaluation


Nutrition/Malnutrition Findings: 


                                        





Nutrition Notes                                            Start:  22 

11:33


Freq:                                                      Status: Active       




Protocol:                                                                       




 Document     22 11:33  KERRIE  (Rec: 22 11:52  KERRIE  [id number])


 Nutrition Notes


     Need for Assessment generated from:         RN Referral


     Initial or Follow up                        Brief Note


     Current Diagnosis                           Diabetes


     Other Pertinent Diagnosis                   Infected (L) diabetic foot, (L


                                                 ) foot osteomyelitis


     Current Diet                                Consistent CHO


     Labs/Tests                                  BUN 29


                                                 


                                                 Iron 16


                                                 TIBC 133


                                                 A1C 6.5


     Pertinent Medications                       NS at 100ml/hr


     Height                                      5 ft 8 in


     Weight                                      72 kg


     Ideal Body Weight (kg)                      70.00


     BMI                                         24.1


     Weight Status                               Appropriate


     Subjective/Other Information                Pt screened for skin risk


                                                 assessment (Sid score: 18).


                                                 He consumed 50% of dinner


                                                 last pm; he is able to feed


                                                 himself.  Revascularization


                                                 procedure scheduled for


                                                 tomorrow; pt will be NPO after


                                                 midnight tonight.  PMHx


                                                 includes PVD (s/p RLE bypass


                                                 10+ yrs ago), (R) foot


                                                 transmetatarsal amputation.


     Burn                                        Absent


     Trauma                                      Absent


     Current % PO                                Fair (50-74%)


     Minimum of two criteria                     No


     Reduced  Strength                       Measurably Reduced (severe)


     Is patient on ventilator?                   No


     Is Patient Ambulatory and/or Out of Bed     No


     REE-([facility]-confined to bed)      [phone]


     Calculation Used for Recommendations        [facility]


     Additional Notes                            Pro needs 1.25-1.5g/k-


                                                 108g/day


                                                 Fluid needs 1ml/kcal


 Nutrition Intervention


     Follow-Up By:                               09/15/22


     Additional Comments                         F/U: intakes, need for ONS

## 2022-09-14 NOTE — POST ANESTHESIA EVALUATION
- Post Anesthesia Evaluation


Patient Participated: Yes


Airway Patent: Yes


Temp > 96.8F: Yes


Pain Manageable: Yes


Adequeate Hydration: Yes


Anesthesia Complications: No


Block Receding Appropriately: No


Patient on Ventilator: No

## 2022-09-14 NOTE — PROGRESS NOTE
Assessment and Plan





Patient is postop day 1 status post amputation of his third toe.  Intraoperative

observations include an abscess between the third and fourth toes extending to 

space between the metatarsal bones.  Extensive necrosis also noted to the distal

part of the fourth toe and possibly extending to include the fifth toe.





MRI reports and it x-rays reviewed with radiologist today.  They confirmed the 

presence of the abscess between the third and fourth toe also extensive bony 

destruction of the third fourth fifth and even the second toe.





Intraoperative findings and MRI findings discussed with the patient.  

Recommendations are to return to the OR tomorrow for potentially transmetatarsal

amputations of the second through the fifth toes.  Patient understands and 

agrees to proceed with surgery.





Subjective


Date of service: 09/14/22


Patient Reports: Positive: still having pain


Narrative: 





Patient is postop day 1 status post amputation of his third toe.  Intraoperative

observations include an abscess between the third and fourth toes extending to 

space between the metatarsal bones.  Extensive necrosis also noted to the distal

part of the fourth toe and possibly extending to include the fifth toe.





MRI reports and it x-rays reviewed with radiologist today.  They confirmed the 

presence of the abscess between the third and fourth toe also extensive bony 

destruction of the third fourth fifth and even the second toe.





Intraoperative findings and MRI findings discussed with the patient.  

Recommendations are to return to the OR tomorrow for potentially transmetatarsal

amputations of the second through the fifth toes.  Patient understands and ag

romero to proceed with surgery.





Objective


                               Vital Signs - 12hr











  09/14/22 09/14/22





  05:31 14:00


 


Temperature 98.4 F 


 


Pulse Rate 106 H 92 H


 


Respiratory 18 





Rate  


 


Blood Pressure 144/57 


 


O2 Sat by Pulse 94 98





Oximetry  














- Labs





                                 09/14/22 06:04





                                 09/14/22 06:04


                                 Diabetes panel











  09/14/22 Range/Units





  06:04 


 


Sodium  141  (137-145)  mmol/L


 


Potassium  4.4  (3.6-5.0)  mmol/L


 


Chloride  100.8  ()  mmol/L


 


Carbon Dioxide  23  (22-30)  mmol/L


 


BUN  13  (9-20)  mg/dL


 


Creatinine  0.9  (0.8-1.3)  mg/dL


 


Glucose  64 L  ()  mg/dL


 


Calcium  8.3 L  (8.4-10.2)  mg/dL








                                  Calcium panel











  09/14/22 Range/Units





  06:04 


 


Calcium  8.3 L  (8.4-10.2)  mg/dL








                                 Pituitary panel











  09/14/22 Range/Units





  06:04 


 


Sodium  141  (137-145)  mmol/L


 


Potassium  4.4  (3.6-5.0)  mmol/L


 


Chloride  100.8  ()  mmol/L


 


Carbon Dioxide  23  (22-30)  mmol/L


 


BUN  13  (9-20)  mg/dL


 


Creatinine  0.9  (0.8-1.3)  mg/dL


 


Glucose  64 L  ()  mg/dL


 


Calcium  8.3 L  (8.4-10.2)  mg/dL








                                  Adrenal panel











  09/14/22 Range/Units





  06:04 


 


Sodium  141  (137-145)  mmol/L


 


Potassium  4.4  (3.6-5.0)  mmol/L


 


Chloride  100.8  ()  mmol/L


 


Carbon Dioxide  23  (22-30)  mmol/L


 


BUN  13  (9-20)  mg/dL


 


Creatinine  0.9  (0.8-1.3)  mg/dL


 


Glucose  64 L  ()  mg/dL


 


Calcium  8.3 L  (8.4-10.2)  mg/dL

## 2022-09-14 NOTE — PROGRESS NOTE
Assessment and Plan





Cultures:


9/10/2022 blood culture: No growth





A/P:


67-year-old male with diabetes, hypertension, peripheral vascular disease with 

previous history of RLE arterial bypass, right TMA, former smoker quit in 2006 

was admitted to the hospital with left third toe wound:





#Sepsis, secondary to diabetic foot infection, osteomyelitis of left foot third 

toe: Risk factors diabetes and peripheral vascular disease. WBC improving. S/p 

revascularization on 9/12/2022, followed by toe amputation 9/13/2022. Noted to 

have necrotic tissue. 





#Thrombocytopenia: ?acute





#JASMEET: Improving.  Renally adjust antibiotics as needed.





#Peripheral vascular disease, history of RLE arterial bypass, prior right TMA. 

S/p revascularization on 9/12/2022.





#Diabetes mellitus type 2, uncontrolled





Recs:


-Continue IV cefepime + Daptomycin. Avoiding linezolid due to thrombocytopenia. 


-CK downtrending, OK to continue Daptomycin


-monitor WBC 





Freddie Pineda MD, FACP, [facility] ([facility])


O: [phone]


F: [phone]


C: [phone]





Subjective


Date of service: 09/14/22


Interval history: 


Afebrile. tolerating abx, no complaints. Underwent toe amputation yesterday. 





Objective





- Exam


Narrative Exam: 





Physical Exam: 


Constitutional: Alert, cooperative. No acute distress


Head, Ears, Nose: Normocephalic, atraumatic. External ears, nose normal


Eyes: Conjunctivae/corneas clear. No icterus. No ptosis.


Neck: Supple, no meningeal signs


Cardiovascular: S1, S2 +


Respiratory: Good air entry, clear to auscultation bilaterally


GI: Soft, non-tender; bowel sounds normal. No peritoneal signs


Musculoskeletal: Right TMA well-healed, left foot dressing +


Skin: No rash or abscess


Hem/Lymphatic: No palpable cervical or supraclavicular nodes. No lymphangitis


Psych: Mood ok. Affect normal


Neurological: Awake, alert, oriented. No gross abnormality 





- Constitutional


Vitals: 


                                   Vital Signs











Temp Pulse Resp BP Pulse Ox


 


 98.4 F   106 H  18   144/57   94 


 


 09/14/22 05:31  09/14/22 05:31  09/14/22 05:31  09/14/22 05:31  09/14/22 05:31








                           Temperature -Last 24 Hours











Temperature                    98.4 F


 


Temperature                    98.0 F


 


Temperature                    98.6 F


 


Temperature                    98.4 F


 


Temperature                    97.7 F

















- Labs


CBC & Chem 7: 


                                 09/14/22 06:04





                                 09/14/22 06:04


Labs: 


                              Abnormal lab results











  09/13/22 09/13/22 09/13/22 Range/Units





  05:49 15:31 15:33 


 


WBC     (4.5-11.0)  K/mm3


 


RBC     (3.65-5.03)  M/mm3


 


Hgb     (11.8-15.2)  gm/dl


 


Hct     (35.5-45.6)  %


 


MCV     (84-94)  fl


 


Plt Count     (140-440)  K/mm3


 


Seg Neuts % (Manual)     (40.0-70.0)  %


 


Lymphocytes % (Manual)     (13.4-35.0)  %


 


Monocytes % (Manual)     (0.0-7.3)  %


 


Seg Neutrophils # Man     (1.8-7.7)  K/mm3


 


Monocytes # (Manual)     (0.0-0.8)  K/mm3


 


Glucose     ()  mg/dL


 


POC Glucose  230 H  319 H  335 H  ()  mg/dL


 


Calcium     (8.4-10.2)  mg/dL


 


Total Creatine Kinase     ()  units/L














  09/13/22 09/13/22 09/14/22 Range/Units





  17:52 20:20 06:04 


 


WBC    27.9 H  (4.5-11.0)  K/mm3


 


RBC    2.48 L  (3.65-5.03)  M/mm3


 


Hgb    7.9 L  (11.8-15.2)  gm/dl


 


Hct    24.7 L  (35.5-45.6)  %


 


MCV    100 H  (84-94)  fl


 


Plt Count    51 L  (140-440)  K/mm3


 


Seg Neuts % (Manual)    82.0 H  (40.0-70.0)  %


 


Lymphocytes % (Manual)    8.0 L  (13.4-35.0)  %


 


Monocytes % (Manual)    10.0 H  (0.0-7.3)  %


 


Seg Neutrophils # Man    22.9 H  (1.8-7.7)  K/mm3


 


Monocytes # (Manual)    2.8 H  (0.0-0.8)  K/mm3


 


Glucose     ()  mg/dL


 


POC Glucose  334 H  250 H   ()  mg/dL


 


Calcium     (8.4-10.2)  mg/dL


 


Total Creatine Kinase     ()  units/L














  09/14/22 Range/Units





  06:04 


 


WBC   (4.5-11.0)  K/mm3


 


RBC   (3.65-5.03)  M/mm3


 


Hgb   (11.8-15.2)  gm/dl


 


Hct   (35.5-45.6)  %


 


MCV   (84-94)  fl


 


Plt Count   (140-440)  K/mm3


 


Seg Neuts % (Manual)   (40.0-70.0)  %


 


Lymphocytes % (Manual)   (13.4-35.0)  %


 


Monocytes % (Manual)   (0.0-7.3)  %


 


Seg Neutrophils # Man   (1.8-7.7)  K/mm3


 


Monocytes # (Manual)   (0.0-0.8)  K/mm3


 


Glucose  64 L  ()  mg/dL


 


POC Glucose   ()  mg/dL


 


Calcium  8.3 L  (8.4-10.2)  mg/dL


 


Total Creatine Kinase  483 H  ()  units/L

## 2022-09-15 PROCEDURE — 0Y6N0ZB DETACHMENT AT LEFT FOOT, PARTIAL 2ND RAY, OPEN APPROACH: ICD-10-PCS | Performed by: SURGERY

## 2022-09-15 PROCEDURE — 0Y6N0ZD DETACHMENT AT LEFT FOOT, PARTIAL 4TH RAY, OPEN APPROACH: ICD-10-PCS | Performed by: SURGERY

## 2022-09-15 PROCEDURE — 0Y6N0ZC DETACHMENT AT LEFT FOOT, PARTIAL 3RD RAY, OPEN APPROACH: ICD-10-PCS | Performed by: SURGERY

## 2022-09-15 PROCEDURE — 0Y6N0ZF DETACHMENT AT LEFT FOOT, PARTIAL 5TH RAY, OPEN APPROACH: ICD-10-PCS | Performed by: SURGERY

## 2022-09-15 RX ADMIN — INSULIN HUMAN SCH UNITS: 100 INJECTION, SOLUTION PARENTERAL at 17:19

## 2022-09-15 RX ADMIN — CILOSTAZOL SCH MG: 100 TABLET ORAL at 08:59

## 2022-09-15 RX ADMIN — Medication SCH ML: at 22:29

## 2022-09-15 RX ADMIN — INSULIN HUMAN SCH UNIT: 100 INJECTION, SUSPENSION SUBCUTANEOUS at 18:08

## 2022-09-15 RX ADMIN — INSULIN HUMAN SCH: 100 INJECTION, SUSPENSION SUBCUTANEOUS at 08:03

## 2022-09-15 RX ADMIN — Medication SCH ML: at 08:59

## 2022-09-15 RX ADMIN — POVIDONE-IODINE SCH: 100 OINTMENT TOPICAL at 09:03

## 2022-09-15 RX ADMIN — MORPHINE SULFATE PRN MG: 4 INJECTION, SOLUTION INTRAMUSCULAR; INTRAVENOUS at 18:13

## 2022-09-15 RX ADMIN — INSULIN HUMAN SCH UNITS: 100 INJECTION, SOLUTION PARENTERAL at 14:48

## 2022-09-15 RX ADMIN — INSULIN HUMAN SCH UNITS: 100 INJECTION, SOLUTION PARENTERAL at 18:09

## 2022-09-15 RX ADMIN — METRONIDAZOLE SCH MLS/HR: 5 INJECTION, SOLUTION INTRAVENOUS at 12:20

## 2022-09-15 RX ADMIN — CEFEPIME SCH MLS/HR: 1 INJECTION, POWDER, FOR SOLUTION INTRAMUSCULAR; INTRAVENOUS at 01:42

## 2022-09-15 RX ADMIN — CEFEPIME SCH MLS/HR: 1 INJECTION, POWDER, FOR SOLUTION INTRAMUSCULAR; INTRAVENOUS at 01:39

## 2022-09-15 RX ADMIN — HEPARIN SODIUM SCH UNIT: 5000 INJECTION, SOLUTION INTRAVENOUS; SUBCUTANEOUS at 04:59

## 2022-09-15 RX ADMIN — CEFEPIME SCH MLS/HR: 1 INJECTION, POWDER, FOR SOLUTION INTRAMUSCULAR; INTRAVENOUS at 15:01

## 2022-09-15 RX ADMIN — INSULIN HUMAN SCH: 100 INJECTION, SOLUTION PARENTERAL at 12:08

## 2022-09-15 RX ADMIN — CEFEPIME SCH MLS/HR: 1 INJECTION, POWDER, FOR SOLUTION INTRAMUSCULAR; INTRAVENOUS at 01:40

## 2022-09-15 RX ADMIN — SODIUM CHLORIDE SCH MLS/HR: 0.9 INJECTION, SOLUTION INTRAVENOUS at 18:13

## 2022-09-15 RX ADMIN — CEFEPIME SCH: 1 INJECTION, POWDER, FOR SOLUTION INTRAMUSCULAR; INTRAVENOUS at 18:31

## 2022-09-15 RX ADMIN — METOPROLOL TARTRATE SCH MG: 100 TABLET, FILM COATED ORAL at 22:27

## 2022-09-15 RX ADMIN — INSULIN HUMAN SCH: 100 INJECTION, SOLUTION PARENTERAL at 12:23

## 2022-09-15 RX ADMIN — CLOPIDOGREL BISULFATE SCH MG: 75 TABLET ORAL at 08:59

## 2022-09-15 RX ADMIN — INSULIN HUMAN SCH: 100 INJECTION, SOLUTION PARENTERAL at 01:31

## 2022-09-15 RX ADMIN — OXYCODONE AND ACETAMINOPHEN PRN TAB: 5; 325 TABLET ORAL at 19:23

## 2022-09-15 RX ADMIN — INSULIN HUMAN SCH: 100 INJECTION, SOLUTION PARENTERAL at 01:32

## 2022-09-15 RX ADMIN — HEPARIN SODIUM SCH UNIT: 5000 INJECTION, SOLUTION INTRAVENOUS; SUBCUTANEOUS at 22:30

## 2022-09-15 RX ADMIN — INSULIN HUMAN SCH: 100 INJECTION, SOLUTION PARENTERAL at 08:03

## 2022-09-15 RX ADMIN — CILOSTAZOL SCH MG: 100 TABLET ORAL at 22:26

## 2022-09-15 RX ADMIN — METOPROLOL TARTRATE SCH MG: 100 TABLET, FILM COATED ORAL at 08:59

## 2022-09-15 RX ADMIN — NIFEDIPINE SCH MG: 30 TABLET, FILM COATED, EXTENDED RELEASE ORAL at 08:59

## 2022-09-15 RX ADMIN — INSULIN HUMAN SCH: 100 INJECTION, SOLUTION PARENTERAL at 05:01

## 2022-09-15 RX ADMIN — METRONIDAZOLE SCH MLS/HR: 5 INJECTION, SOLUTION INTRAVENOUS at 22:34

## 2022-09-15 RX ADMIN — HEPARIN SODIUM SCH: 5000 INJECTION, SOLUTION INTRAVENOUS; SUBCUTANEOUS at 13:37

## 2022-09-15 NOTE — PROGRESS NOTE
Assessment and Plan


Assessment and plan: 





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


Diabetic foot infection with ulceration of left third digit


Osteomyelitis of left foot


Peripheral arterial disease of bilateral lower extremities


-Visualized on x-ray of left foot and CT of left lower extremity. Pending MRI 

left foot


WBC 27-->21-->17.9


-Continue IV cefepime + Daptomycin. Avoiding linezolid due to thrombocytopenia. 


-CK downtrending, ID wants to cintinue Daptomycin


Infectious disease consulted; appreciate recs


Vascular surgery consulted; appreciate recs.  Planning for revascularization on

2022.


General surgery consulted; appreciate recs.  Plan for amputation on 2022.


Continue to monitor





Insulin dependent type II diabetes mellitus with hyperglycemiaimproving


Worsened by diabetic foot infection


- hemoglobin A1c: 6.5


- home regimen: Unknown


- current regimen: NPH 25 units twice daily + moderate SSI +8 units regular 

insulin with meals


- blood glucose goal 140-180 while inpatient


- continue to monitor





Pseudohyponatremiaresolved


Sodium 129 (corrected 136)





JASMEET on likely CKD stage IIIresolved


Creatinine 1.7-->0.9 (baseline unknown)


Status post aggressive IV fluid resuscitation


Renally dose meds and avoid nephrotoxic drugs.


Monitor with repeat BMP tomorrow.  Consider nephrology consult showed 

creatinine 1 continue to increase.





Hypertension


- home medications: Unknown


- current medications: Started nifedipine 30 mg daily


- SBP goal <160 and DBP goal <90 while inpatient


- continue to monitor





Iron deficiency anemia


anemia of chronic disease


Hemoglobin 8.2


 Iron 16, TIBC 133, ferritin 2880.  Transfuse if hemoglobin <7





Chronic thrombocytopenia


Platelets 31-->27-->47--41 (status post transfusion of 1 platelet on 2022)


Patient endorses being aware but not having etiology determined by other 

clinicians.


 Pending transfusion of 1 additional jumbo platelet for upcoming amputation by 

general surgery.





#Moderate protein caloric malnutrition


Albumin 3.6


Will initiate dietary supplementation once hyperglycemia is under control.





Disposition


Case management reports placement at a group home





History


Interval history: 





No new issues overnight





Hospitalist Physical





- Constitutional


Vitals: 


                                        











Temp Pulse Resp BP Pulse Ox


 


 98.4 F   125 H  18   175/70   95 


 


 09/15/22 07:51  09/15/22 08:59  09/15/22 07:51  09/15/22 08:59  09/15/22 07:51











General appearance: Present: no acute distress, well-nourished, malodorous 

(Malodorous wet gangrene of left foot with ulceration of third digit)





- EENT


Eyes: Present: PERRL, EOM intact


ENT: hearing intact, clear oral mucosa, dentition normal





- Neck


Neck: Present: supple, normal ROM





- Respiratory


Respiratory effort: normal


Respiratory: bilateral: CTA





- Cardiovascular


Rhythm: regular


Heart Sounds: Present: S1 & S2.  Absent: gallop, rub





- Extremities


Extremities: no ischemia, No edema, Full ROM





- Abdominal


General gastrointestinal: soft, non-tender, non-distended, normal bowel sounds





- Integumentary


Integumentary: Present: clear, warm, dry





- Neurologic


Neurologic: CNII-XII intact, moves all extremities





Results





- Labs


CBC & Chem 7: 


                                 22 06:04





                                 22 06:04


Labs: 


                             Laboratory Last Values











WBC  27.9 K/mm3 (4.5-11.0)  H  22  06:04    


 


RBC  2.48 M/mm3 (3.65-5.03)  L  22  06:04    


 


Hgb  7.9 gm/dl (11.8-15.2)  L  22  06:04    


 


Hct  24.7 % (35.5-45.6)  L  22  06:04    


 


MCV  100 fl (84-94)  H  22  06:04    


 


MCH  32 pg (28-32)   22  06:04    


 


MCHC  32 % (32-34)   22  06:04    


 


RDW  13.3 % (13.2-15.2)   22  06:04    


 


Plt Count  51 K/mm3 (140-440)  L  22  06:04    


 


Lymph % (Auto)  16.2 % (13.4-35.0)   22  Unknown


 


Mono % (Auto)  11.9 % (0.0-7.3)  H  22  Unknown


 


Eos % (Auto)  0.7 % (0.0-4.3)   22  Unknown


 


Baso % (Auto)  0.5 % (0.0-1.8)   22  Unknown


 


Lymph # (Auto)  2.9 K/mm3 (1.2-5.4)   22  Unknown


 


Mono # (Auto)  2.1 K/mm3 (0.0-0.8)  H  22  Unknown


 


Eos # (Auto)  0.1 K/mm3 (0.0-0.4)   22  Unknown


 


Baso # (Auto)  0.1 K/mm3 (0.0-0.1)   22  Unknown


 


Add Manual Diff  Complete   22  06:04    


 


Total Counted  100   22  06:04    


 


Seg Neutrophils %  70.7 % (40.0-70.0)  H  22  Unknown


 


Seg Neuts % (Manual)  82.0 % (40.0-70.0)  H  22  06:04    


 


Band Neutrophils %  0 %  22  06:04    


 


Lymphocytes % (Manual)  8.0 % (13.4-35.0)  L  22  06:04    


 


Reactive Lymphs % (Man)  0 %  22  06:04    


 


Monocytes % (Manual)  10.0 % (0.0-7.3)  H  22  06:04    


 


Eosinophils % (Manual)  0 % (0.0-4.3)   22  06:04    


 


Basophils % (Manual)  0 % (0.0-1.8)   22  06:04    


 


Metamyelocytes %  0 %  22  06:04    


 


Myelocytes %  0 %  22  06:04    


 


Promyelocytes %  0 %  22  06:04    


 


Blast Cells %  0 %  22  06:04    


 


Nucleated RBC %  Not Reportable   22  06:04    


 


Seg Neutrophils #  12.6 K/mm3 (1.8-7.7)  H  22  Unknown


 


Seg Neutrophils # Man  22.9 K/mm3 (1.8-7.7)  H  22  06:04    


 


Band Neutrophils #  0.0 K/mm3  22  06:04    


 


Lymphocytes # (Manual)  2.2 K/mm3 (1.2-5.4)   22  06:04    


 


Abs React Lymphs (Man)  0.0 K/mm3  22  06:04    


 


Monocytes # (Manual)  2.8 K/mm3 (0.0-0.8)  H  22  06:04    


 


Eosinophils # (Manual)  0.0 K/mm3 (0.0-0.4)   22  06:04    


 


Basophils # (Manual)  0.0 K/mm3 (0.0-0.1)   22  06:04    


 


Metamyelocytes #  0.0 K/mm3  22  06:04    


 


Myelocytes #  0.0 K/mm3  22  06:04    


 


Promyelocytes #  0.0 K/mm3  22  06:04    


 


Blast Cells #  0.0 K/mm3  22  06:04    


 


WBC Morphology  Not Reportable   22  06:04    


 


Hypersegmented Neuts  Not Reportable   22  06:04    


 


Hyposegmented Neuts  Not Reportable   22  06:04    


 


Hypogranular Neuts  Not Reportable   22  06:04    


 


Smudge Cells  Not Reportable   22  06:04    


 


Toxic Granulation  Not Reportable   22  06:04    


 


Toxic Vacuolation  Not Reportable   22  06:04    


 


Dohle Bodies  Not Reportable   22  06:04    


 


Pelger-Huet Anomaly  Not Reportable   22  06:04    


 


Olive Rods  Not Reportable   22  06:04    


 


Platelet Estimate  Consistent w auto   22  06:04    


 


Clumped Platelets  Not Reportable   22  06:04    


 


Plt Clumps, EDTA  Not Reportable   22  06:04    


 


Large Platelets  Not Reportable   22  06:04    


 


Giant Platelets  Not Reportable   22  06:04    


 


Platelet Satelliting  Not Reportable   22  06:04    


 


Plt Morphology Comment  Not Reportable   22  06:04    


 


RBC Morphology  Not Reportable   22  06:04    


 


Dimorphic RBCs  Not Reportable   22  06:04    


 


Polychromasia  Not Reportable   22  06:04    


 


Hypochromasia  1+   22  06:04    


 


Poikilocytosis  Not Reportable   22  06:04    


 


Anisocytosis  Not Reportable   22  06:04    


 


Microcytosis  Not Reportable   22  06:04    


 


Macrocytosis  Not Reportable   22  06:04    


 


Spherocytes  Not Reportable   22  06:04    


 


Pappenheimer Bodies  Not Reportable   22  06:04    


 


Sickle Cells  Not Reportable   22  06:04    


 


Target Cells  Not Reportable   22  06:04    


 


Tear Drop Cells  Not Reportable   22  06:04    


 


Ovalocytes  Not Reportable   22  06:04    


 


Helmet Cells  Not Reportable   22  06:04    


 


Soler-August Bodies  Not Reportable   22  06:04    


 


Cabot Rings  Not Reportable   22  06:04    


 


Jamison Cells  Not Reportable   22  06:04    


 


Bite Cells  Not Reportable   22  06:04    


 


Crenated Cell  Not Reportable   22  06:04    


 


Elliptocytes  Not Reportable   22  06:04    


 


Acanthocytes (Spur)  Not Reportable   22  06:04    


 


Rouleaux  Not Reportable   22  06:04    


 


Hemoglobin C Crystals  Not Reportable   22  06:04    


 


Schistocytes  Not Reportable   22  06:04    


 


Malaria parasites  Not Reportable   22  06:04    


 


Tej Bodies  Not Reportable   22  06:04    


 


Hem Pathologist Commnt  No   22  06:04    


 


PT  14.5 Sec. (12.2-14.9)   22  Unknown


 


INR  0.99  (0.87-1.13)   22  Unknown


 


Sodium  141 mmol/L (137-145)   22  06:04    


 


Potassium  4.4 mmol/L (3.6-5.0)   22  06:04    


 


Chloride  100.8 mmol/L ()   22  06:04    


 


Carbon Dioxide  23 mmol/L (22-30)   22  06:04    


 


Anion Gap  22 mmol/L  22  06:04    


 


BUN  13 mg/dL (9-20)   22  06:04    


 


Creatinine  0.9 mg/dL (0.8-1.3)   22  06:04    


 


Estimated GFR  > 60 ml/min  22  06:04    


 


BUN/Creatinine Ratio  14 %  22  06:04    


 


Glucose  64 mg/dL ()  L  22  06:04    


 


POC Glucose  109 mg/dL ()  H  22  22:37    


 


Hemoglobin A1c  6.5 % (4-6)  H  09/10/22  08:41    


 


Lactic Acid  1.50 mmol/L (0.7-2.0)   09/10/22  02:26    


 


Calcium  8.3 mg/dL (8.4-10.2)  L  22  06:04    


 


Iron  16 ug/dL ()  L  22  05:35    


 


TIBC  133 mcg/dL (250-450)  L  22  05:35    


 


Ferritin  2888.0 ng/mL (30.0-300.0)  H  22  05:35    


 


Total Bilirubin  0.70 mg/dL (0.1-1.2)   09/10/22  02:26    


 


AST  41 units/L (5-40)  H  09/10/22  02:26    


 


ALT  28 units/L (7-56)   09/10/22  02:26    


 


Alkaline Phosphatase  129 units/L ()   09/10/22  02:26    


 


Total Creatine Kinase  304 units/L ()  H  09/15/22  05:15    


 


Total Protein  6.9 g/dL (6.3-8.2)   09/10/22  02:26    


 


Albumin  3.6 g/dL (3.9-5)  L  09/10/22  02:26    


 


Albumin/Globulin Ratio  1.1 %  09/10/22  02:26    


 


Triglycerides  114 mg/dL (2-149)   09/10/22  08:41    


 


Cholesterol  116 mg/dL ()   09/10/22  08:41    


 


LDL Cholesterol Direct  46 mg/dL ()  L  09/10/22  08:41    


 


HDL Cholesterol  37 mg/dL (40-59)  L  09/10/22  08:41    


 


Cholesterol/HDL Ratio  3.13 %  09/10/22  08:41    


 


Blood Type  O POSITIVE   22  Unknown











Microbiology: 


Microbiology





22 15:32   Toe - Left Third   Surgical Culture - Preliminary


09/10/22 02:26   Peripheral/Venous   Blood Culture - Final


                            NO GROWTH AFTER 5 DAYS


09/10/22 02:26   Peripheral/Venous   Blood Culture - Final


                            NO GROWTH AFTER 5 DAYS








Toney/IV: 


                                        





Voiding Method                   Urinal











Active Medications





- Current Medications


Current Medications: 














Generic Name Dose Route Start Last Admin





  Trade Name Freq  PRN Reason Stop Dose Admin


 


Acetaminophen  650 mg  09/10/22 08:30  22 01:31





  Acetaminophen 325 Mg Tab  PO   650 mg





  Q4H PRN   Administration





  Pain MILD(1-3)/Fever >100.5/HA  


 


Atorvastatin Calcium  40 mg  22 11:00  09/15/22 08:59





  Atorvastatin 40 Mg Tab  PO   40 mg





  DAILY ELVIE   Administration


 


Cilostazol  100 mg  22 12:00  09/15/22 08:59





  Cilostazol 100 Mg Tab  PO   100 mg





  BID ELVIE   Administration


 


Clopidogrel Bisulfate  75 mg  22 10:00  09/15/22 08:59





  Clopidogrel 75 Mg Tab  PO   75 mg





  QDAY ELVIE   Administration


 


Dextrose  50 ml  09/10/22 08:35 





  Dextrose 50% In Water (25gm) 50 Ml Syringe  IV  





  Q30MIN PRN  





  Hypoglycemia  





  Protocol  


 


Heparin Sodium (Porcine)  5,000 unit  09/10/22 14:00  09/15/22 04:59





  Heparin 5,000 Unit/1 Ml Vial  SUB-Q   5,000 unit





  Q8HR ELVIE   Administration


 


Sodium Chloride  1,000 mls @ 100 mls/hr  09/10/22 05:30  22 17:04





  Nacl 0.9% 1000 Ml  IV   100 mls/hr





  AS DIRECT ELVIE   Administration


 


Cefepime HCl  1 gm in 100 mls @ 200 mls/hr  09/10/22 09:00  09/15/22 01:42





  Cefepime/Ns 1 Gm/100 Ml  IV   200 mls/hr





  Q8H ELVIE   Administration





  Protocol  


 


Daptomycin 500 mg/ Sodium  100 mls @ 200 mls/hr  22 18:00  22 17:59





  Chloride  IV   200 mls/hr





  Q24H ELVIE   Administration





  Protocol  


 


Insulin Human Isoph/Insulin Regular  25 unit  09/10/22 19:16  09/15/22 08:03





  Insulin Nph/Regular 70/30 Inj  SUB-Q   Not Given





  [facility]  


 


Insulin Human Regular  5 units  09/10/22 22:00  09/15/22 08:03





  Insulin Regular, Human 100 Units/1 Ml  SUB-Q   Not Given





  [facility]  


 


Insulin Human Regular  0 units  22 00:00  09/15/22 05:01





  Insulin Regular, Human 100 Units/1 Ml  SUB-Q   Not Given





  Q6H [facility]  





  Protocol  


 


Metoclopramide HCl  10 mg  22 17:34  22 22:50





  Metoclopramide 10 Mg/2 Ml Inj  IV   10 mg





  Q6H PRN   Administration





  Nausea And Vomiting  


 


Metoprolol Tartrate  100 mg  22 11:00  09/15/22 08:59





  Metoprolol Tartrate 100 Mg Tab  PO   100 mg





  BID ELVIE   Administration


 


Morphine Sulfate  2 mg  09/10/22 08:30  22 21:14





  Morphine 4 Mg/1 Ml Inj  IV   2 mg





  Q4H PRN   Administration





  Pain , Severe (7-10)  


 


Nifedipine  30 mg  22 10:00  09/15/22 08:59





  Nifedipine Xl 30 Mg Tab  PO   30 mg





  QDAY ELVIE   Administration


 


Ondansetron HCl  4 mg  09/10/22 08:30 





  Ondansetron 4 Mg/2 Ml Inj  IV  





  Q8H PRN  





  Nausea And Vomiting  


 


Oxycodone/Acetaminophen  1 tab  09/10/22 08:30  22 21:49





  Oxycodone /Acetaminophen 5-325mg Tab  PO   1 tab





  Q6H PRN   Administration





  Pain, Moderate (4-6)  


 


Povidone Iodine  1 applic  09/10/22 15:00  09/15/22 09:03





  Povidone-Iodine Ointment 28.35 Gm  TP   Not Given





  DAILY [facility]  


 


Sodium Chloride  10 ml  09/10/22 10:00  09/15/22 08:59





  Sodium Chloride 0.9% 10 Ml Flush Syringe  IV   10 ml





  BID ELVIE   Administration


 


Sodium Chloride  10 ml  09/10/22 08:30 





  Sodium Chloride 0.9% 10 Ml Flush Syringe  IV  





  PRN PRN  





  LINE FLUSH  














Nutrition/Malnutrition Assess





- Dietary Evaluation


Nutrition/Malnutrition Findings: 


                                        





Nutrition Notes                                            Start:  22 

11:33


Freq:                                                      Status: Active       




Protocol:                                                                       




 Document     22 11:33  KERRIE  (Rec: 22 11:52  KERRIE  [id number])


 Nutrition Notes


     Need for Assessment generated from:         RN Referral


     Initial or Follow up                        Brief Note


     Current Diagnosis                           Diabetes


     Other Pertinent Diagnosis                   Infected (L) diabetic foot, (L


                                                 ) foot osteomyelitis


     Current Diet                                Consistent CHO


     Labs/Tests                                  BUN 29


                                                 


                                                 Iron 16


                                                 TIBC 133


                                                 A1C 6.5


     Pertinent Medications                       NS at 100ml/hr


     Height                                      5 ft 8 in


     Weight                                      72 kg


     Ideal Body Weight (kg)                      70.00


     BMI                                         24.1


     Weight Status                               Appropriate


     Subjective/Other Information                Pt screened for skin risk


                                                 assessment (Sid score: 18).


                                                 He consumed 50% of dinner


                                                 last pm; he is able to feed


                                                 himself.  Revascularization


                                                 procedure scheduled for


                                                 tomorrow; pt will be NPO after


                                                 midnight tonight.  PMHx


                                                 includes PVD (s/p RLE bypass


                                                 10+ yrs ago), (R) foot


                                                 transmetatarsal amputation.


     Burn                                        Absent


     Trauma                                      Absent


     Current % PO                                Fair (50-74%)


     Minimum of two criteria                     No


     Reduced  Strength                       Measurably Reduced (severe)


     Is patient on ventilator?                   No


     Is Patient Ambulatory and/or Out of Bed     No


     REE-([facility]-confined to bed)      [phone]


     Calculation Used for Recommendations        [facility]


     Additional Notes                            Pro needs 1.25-1.5g/k-


                                                 108g/day


                                                 Fluid needs 1ml/kcal


 Nutrition Intervention


     Follow-Up By:                               09/15/22


     Additional Comments                         F/U: intakes, need for ONS

## 2022-09-15 NOTE — OPERATIVE REPORT
Operative Report


Operative Report: 





Procedure date: 9/15/2022





Preop diagnosis: Osteomyelitis and extensive soft tissue infection forefoot left

side





Postop diagnosis: Same





Procedure: Transmit amputation of second third fourth and fifth toes





Surgeon: Dr. Griffin





Assistant: Dr. Connell





Estimated blood loss minimal.





Specimen: Necrotic left foot second third fourth and fifth toes





Findings: This is a 67-year-old -American gentleman with diabetes and 

peripheral vascular disease.  He is with an extensive soft tissue necrosis of 

the left forefoot.  Air and abscess collections between the metatarsals are 

noted.  Changes consistent with osteomyelitis also noted on MRI of the second 

third fourth and fifth toes.





Patient is taken to the OR under general LMA anesthesia timeouts completed 

consent is reviewed and put laced on the chart.  The left foot is then prepped 

with Betadine draped in a sterile fashion.  An incision of the skin along the 

necrotic necrosis line on the dorsum and palmar aspect of the of the foot is 

then made.  Scalpel was used to take the incision sharply down to the level of 

the bone.  Periosteal elevator is used to elevate the soft tissue and periosteum

off of the metatarsal heads.  A oscillating saw was then used to transect the 

metatarsal heads.  Condition room necrotic tissue was debrided from the plantar 

and dorsal aspects.  An additional pocket of abscess infection is noted between 

the third and fourth metatarsals.  This is irrigated with copious months of 

Dakin's solution full-strength.  Hemostasis is good and is maintained with 

electrocautery.





Loose approximation of the skin flaps is done down with a 2-0 Vicryl suture for 

the deep subcu tissue not particular leg there.  The skin closure is then 

completed with 2-0 nylon suture interrupted.  1/4 inch Penrose is placed to the 

deep space and tied to itself.  Sterile dressing is placed over this patient 

tolerated seizure well.

## 2022-09-15 NOTE — PROGRESS NOTE
Assessment and Plan





Patient doing well following his revascularization procedure.  Management per 

surgery regarding toe amputations.  Patient otherwise doing well





Subjective


Date of service: 09/15/22


Principal diagnosis: PVD with critical limb


Interval history: 





Patient with a history of toe infection status post amputation of the third toe 

on his left foot.  Prior to this, he underwent a revascularization procedure.  

His foot is wrapped as he has planned a second through fourth toe amputations 

today.  The patient's lower leg to the dorsum of the foot are warm and 

asymmetrically warmer than his contralateral leg indicating increased perfusion.

 His right groin puncture site is soft with no significant hematoma.





Objective





- Constitutional


Vitals: 


                               Vital Signs - 12hr











  09/15/22 09/15/22 09/15/22





  03:44 07:51 08:59


 


Temperature 97.5 F L 98.4 F 


 


Pulse Rate 106 H 125 H 125 H


 


Respiratory 18 18 





Rate   


 


Blood Pressure 147/60 175/70 175/70


 


O2 Sat by Pulse 90 95 





Oximetry   














  09/15/22





  11:09


 


Temperature 98.7 F


 


Pulse Rate 97 H


 


Respiratory 18





Rate 


 


Blood Pressure 148/65


 


O2 Sat by Pulse 96





Oximetry 











General appearance: Present: no acute distress





- EENT


Eyes: EOM intact


ENT: hearing intact





- Neck


Neck: supple, normal ROM





- Respiratory


Respiratory effort: normal


Extremities: abnormal





- Gastrointestinal


General gastrointestinal: Present: deferred


Rectal Exam: deferred





- Genitourinary


Male genitourinary: deferred





- Labs


CBC & Chem 7: 


                                 09/14/22 06:04





                                 09/14/22 06:04


Labs: 


                              Abnormal lab results











  09/14/22 09/14/22 09/14/22 Range/Units





  13:06 16:00 22:37 


 


POC Glucose  220 H  107 H  109 H  ()  mg/dL


 


Total Creatine Kinase     ()  units/L














  09/15/22 09/15/22 09/15/22 Range/Units





  04:51 05:15 07:53 


 


POC Glucose  189 H   242 H  ()  mg/dL


 


Total Creatine Kinase   304 H   ()  units/L














  09/15/22 Range/Units





  11:09 


 


POC Glucose  269 H  ()  mg/dL


 


Total Creatine Kinase   ()  units/L














Medications & Allergies





- Medications


Allergies/Adverse Reactions: 


                                    Allergies





No Known Allergies Allergy (Verified 09/13/22 13:17)


   








Home Medications: 


                                Home Medications











 Medication  Instructions  Recorded  Confirmed  Last Taken  Type


 


Tamsulosin [Flomax] 0.4 mg PO QDAY 09/10/22 09/13/22 Unknown History


 


traZODone [Desyrel] 100 mg PO QHS 09/10/22 09/13/22 Unknown History


 


AtorvaSTATin [Lipitor] 40 mg PO QHS 09/13/22 09/13/22 Unknown History


 


Chlorthalidone [Thalitone] 25 mg PO QDAY 09/13/22 09/13/22 Unknown History


 


Insulin Glargine,Hum.rec.anlog 60 units SQ QDAY 09/13/22 09/13/22 Unknown 

History





[Lantus Solostar]     


 


Lisinopril [Zestril TAB] 30 mg PO QDAY 09/13/22 09/13/22 Unknown History


 


Lispro Insulin [HumaLOG] 10 units SQ TIDWM 09/13/22 09/13/22 Unknown History


 


Metoprolol [Lopressor] 100 mg PO BID 09/13/22 09/13/22 Unknown History


 


Multivitamin 1 each PO QDAY 09/13/22 09/13/22 Unknown History


 


Sertraline [Zoloft] 100 mg PO QDAY 09/13/22 09/13/22 Unknown History


 


Timolol Maleate/Pf [Timolol 1 drop OU BID 09/13/22 09/13/22 Unknown History





Maleate 0.5% Eye Drop]     


 


cilostazoL [Pletal] 100 mg PO BID 09/13/22 09/13/22 Unknown History











Active Medications: 














Generic Name Dose Route Start Last Admin





  Trade Name Freq  PRN Reason Stop Dose Admin


 


Acetaminophen  650 mg  09/10/22 08:30  09/12/22 01:31





  Acetaminophen 325 Mg Tab  PO   650 mg





  Q4H PRN   Administration





  Pain MILD(1-3)/Fever >100.5/HA  


 


Atorvastatin Calcium  40 mg  09/13/22 11:00  09/15/22 08:59





  Atorvastatin 40 Mg Tab  PO   40 mg





  DAILY ELVIE   Administration


 


Cilostazol  100 mg  09/12/22 12:00  09/15/22 08:59





  Cilostazol 100 Mg Tab  PO   100 mg





  BID ELVIE   Administration


 


Clopidogrel Bisulfate  75 mg  09/13/22 10:00  09/15/22 08:59





  Clopidogrel 75 Mg Tab  PO   75 mg





  QDAY ELVIE   Administration


 


Dextrose  50 ml  09/10/22 08:35 





  Dextrose 50% In Water (25gm) 50 Ml Syringe  IV  





  Q30MIN PRN  





  Hypoglycemia  





  Protocol  


 


Heparin Sodium (Porcine)  5,000 unit  09/10/22 14:00  09/15/22 04:59





  Heparin 5,000 Unit/1 Ml Vial  SUB-Q   5,000 unit





  Q8HR ELVIE   Administration


 


Sodium Chloride  1,000 mls @ 100 mls/hr  09/10/22 05:30  09/14/22 17:04





  Nacl 0.9% 1000 Ml  IV   100 mls/hr





  AS DIRECT ELVIE   Administration


 


Cefepime HCl  1 gm in 100 mls @ 200 mls/hr  09/10/22 09:00  09/15/22 01:42





  Cefepime/Ns 1 Gm/100 Ml  IV   200 mls/hr





  Q8H ELVIE   Administration





  Protocol  


 


Daptomycin 500 mg/ Sodium  100 mls @ 200 mls/hr  09/13/22 18:00  09/14/22 17:59





  Chloride  IV   200 mls/hr





  Q24H ELVIE   Administration





  Protocol  


 


Metronidazole  500 mg in 100 mls @ 100 mls/hr  09/15/22 12:00  09/15/22 12:20





  Flagyl 500 Mg/100 Ml  IV   100 mls/hr





  Q8H ELVIE   Administration





  Protocol  


 


Cefepime HCl  1 gm in 100 mls @ 200 mls/hr  09/15/22 13:00 





  Cefepime/Ns 1 Gm/100 Ml  IV  





  Q8H [facility]  





  Protocol  


 


Insulin Human Isoph/Insulin Regular  25 unit  09/10/22 19:16  09/15/22 08:03





  Insulin Nph/Regular 70/30 Inj  SUB-Q   Not Given





  BIDDIAB [facility]  


 


Insulin Human Regular  5 units  09/10/22 22:00  09/15/22 12:08





  Insulin Regular, Human 100 Units/1 Ml  SUB-Q   Not Given





  ACHS [facility]  


 


Insulin Human Regular  0 units  09/11/22 00:00  09/15/22 12:23





  Insulin Regular, Human 100 Units/1 Ml  SUB-Q   Not Given





  Q6H [facility]  





  Protocol  


 


Metoclopramide HCl  10 mg  09/12/22 17:34  09/12/22 22:50





  Metoclopramide 10 Mg/2 Ml Inj  IV   10 mg





  Q6H PRN   Administration





  Nausea And Vomiting  


 


Metoprolol Tartrate  100 mg  09/13/22 11:00  09/15/22 08:59





  Metoprolol Tartrate 100 Mg Tab  PO   100 mg





  BID ELVIE   Administration


 


Morphine Sulfate  2 mg  09/10/22 08:30  09/14/22 21:14





  Morphine 4 Mg/1 Ml Inj  IV   2 mg





  Q4H PRN   Administration





  Pain , Severe (7-10)  


 


Nifedipine  30 mg  09/11/22 10:00  09/15/22 08:59





  Nifedipine Xl 30 Mg Tab  PO   30 mg





  QDAY ELVIE   Administration


 


Ondansetron HCl  4 mg  09/10/22 08:30 





  Ondansetron 4 Mg/2 Ml Inj  IV  





  Q8H PRN  





  Nausea And Vomiting  


 


Oxycodone/Acetaminophen  1 tab  09/10/22 08:30  09/13/22 21:49





  Oxycodone /Acetaminophen 5-325mg Tab  PO   1 tab





  Q6H PRN   Administration





  Pain, Moderate (4-6)  


 


Povidone Iodine  1 applic  09/10/22 15:00  09/15/22 09:03





  Povidone-Iodine Ointment 28.35 Gm  TP   Not Given





  DAILY ELVIE  


 


Sodium Chloride  10 ml  09/10/22 10:00  09/15/22 08:59





  Sodium Chloride 0.9% 10 Ml Flush Syringe  IV   10 ml





  BID ELVIE   Administration


 


Sodium Chloride  10 ml  09/10/22 08:30 





  Sodium Chloride 0.9% 10 Ml Flush Syringe  IV  





  PRN PRN  





  LINE FLUSH

## 2022-09-15 NOTE — PROGRESS NOTE
Assessment and Plan





Cultures:


9/10/2022 blood culture: No growth


9/13/2022 left third toe surgical culture: In process.  Gram stain appears mixed

with GPC in pairs, rare GNR.





A/P:


67-year-old male with diabetes, hypertension, peripheral vascular disease with 

previous history of RLE arterial bypass, right TMA, former smoker quit in 2006 

was admitted to the hospital with left third toe wound:





#Sepsis, secondary to diabetic foot infection, osteomyelitis of left foot third 

toe with abscess: Risk factors diabetes and peripheral vascular disease. WBC 

elevated. S/p revascularization on 9/12/2022, followed by toe amputation 

9/13/2022. Noted to have necrotic tissue.  MRI with abscess between the third 

and fourth toe also extensive bony destruction of the third fourth fifth and 

even the second toe.





#Thrombocytopenia: ?acute





#JASMEET: Improved.  Renally adjust antibiotics as needed.





#Peripheral vascular disease, history of RLE arterial bypass, prior right TMA. 

S/p revascularization on 9/12/2022.





#Diabetes mellitus type 2, uncontrolled





Recs:


-Continue IV cefepime + Daptomycin. Avoiding linezolid due to thrombocytopenia. 


-Flagyl added for anaerobic coverage


-CK continues to downtrend, okay to continue IV daptomycin


-Follow-up surgical culture


-Noted plans to return to the OR for potential transmetatarsal amputations of 

the second through the fifth toes





Freddie Pineda MD, FACP, [facility] ([facility]


O: [phone]


F: [phone]


C: [phone]





Subjective


Date of service: 09/15/22


Interval history: 


Tmax 99.7F. Tolerating abx, no complaints. Awaiting OR. 





Objective





- Exam


Narrative Exam: 





Physical Exam: 


Constitutional: Alert, cooperative. No acute distress


Head, Ears, Nose: Normocephalic, atraumatic. External ears, nose normal


Eyes: Conjunctivae/corneas clear. No icterus. No ptosis.


Neck: Supple, no meningeal signs


Cardiovascular: S1, S2 +


Respiratory: Good air entry, clear to auscultation bilaterally


GI: Soft, non-tender; bowel sounds normal. No peritoneal signs


Musculoskeletal: Right TMA well-healed, left foot dressing +


Skin: No rash or abscess


Hem/Lymphatic: No palpable cervical or supraclavicular nodes. No lymphangitis


Psych: Mood ok. Affect normal


Neurological: Awake, alert, oriented. No gross abnormality  





- Constitutional


Vitals: 


                                   Vital Signs











Temp Pulse Resp BP Pulse Ox


 


 98.4 F   125 H  18   175/70   95 


 


 09/15/22 07:51  09/15/22 08:59  09/15/22 07:51  09/15/22 08:59  09/15/22 07:51








                           Temperature -Last 24 Hours











Temperature                    98.4 F


 


Temperature                    97.5 F


 


Temperature                    99.3 F


 


Temperature                    99.7 F


 


Temperature                    98.3 F


 


Temperature                    98.1 F

















- Labs


CBC & Chem 7: 


                                 09/14/22 06:04





                                 09/14/22 06:04


Labs: 


                              Abnormal lab results











  09/14/22 09/14/22 09/14/22 Range/Units





  08:35 13:06 16:00 


 


POC Glucose  111 H  220 H  107 H  ()  mg/dL


 


Total Creatine Kinase     ()  units/L














  09/14/22 09/15/22 Range/Units





  22:37 05:15 


 


POC Glucose  109 H   ()  mg/dL


 


Total Creatine Kinase   304 H  ()  units/L

## 2022-09-16 RX ADMIN — HEPARIN SODIUM SCH UNIT: 5000 INJECTION, SOLUTION INTRAVENOUS; SUBCUTANEOUS at 13:19

## 2022-09-16 RX ADMIN — METOPROLOL TARTRATE SCH MG: 100 TABLET, FILM COATED ORAL at 22:55

## 2022-09-16 RX ADMIN — HEPARIN SODIUM SCH UNIT: 5000 INJECTION, SOLUTION INTRAVENOUS; SUBCUTANEOUS at 06:10

## 2022-09-16 RX ADMIN — INSULIN HUMAN SCH: 100 INJECTION, SOLUTION PARENTERAL at 00:01

## 2022-09-16 RX ADMIN — MORPHINE SULFATE PRN MG: 4 INJECTION, SOLUTION INTRAMUSCULAR; INTRAVENOUS at 03:32

## 2022-09-16 RX ADMIN — CILOSTAZOL SCH MG: 100 TABLET ORAL at 21:12

## 2022-09-16 RX ADMIN — INSULIN HUMAN SCH UNIT: 100 INJECTION, SUSPENSION SUBCUTANEOUS at 17:11

## 2022-09-16 RX ADMIN — CEFEPIME SCH MLS/HR: 1 INJECTION, POWDER, FOR SOLUTION INTRAMUSCULAR; INTRAVENOUS at 06:19

## 2022-09-16 RX ADMIN — MORPHINE SULFATE PRN MG: 4 INJECTION, SOLUTION INTRAMUSCULAR; INTRAVENOUS at 12:29

## 2022-09-16 RX ADMIN — INSULIN HUMAN SCH: 100 INJECTION, SOLUTION PARENTERAL at 12:24

## 2022-09-16 RX ADMIN — HEPARIN SODIUM SCH UNIT: 5000 INJECTION, SOLUTION INTRAVENOUS; SUBCUTANEOUS at 21:13

## 2022-09-16 RX ADMIN — SODIUM CHLORIDE SCH MLS/HR: 0.9 INJECTION, SOLUTION INTRAVENOUS at 12:29

## 2022-09-16 RX ADMIN — INSULIN HUMAN SCH: 100 INJECTION, SOLUTION PARENTERAL at 07:03

## 2022-09-16 RX ADMIN — METRONIDAZOLE SCH MLS/HR: 5 INJECTION, SOLUTION INTRAVENOUS at 20:48

## 2022-09-16 RX ADMIN — NIFEDIPINE SCH MG: 30 TABLET, FILM COATED, EXTENDED RELEASE ORAL at 09:23

## 2022-09-16 RX ADMIN — CEFEPIME SCH MLS/HR: 1 INJECTION, POWDER, FOR SOLUTION INTRAMUSCULAR; INTRAVENOUS at 00:09

## 2022-09-16 RX ADMIN — METOPROLOL TARTRATE SCH MG: 100 TABLET, FILM COATED ORAL at 09:23

## 2022-09-16 RX ADMIN — INSULIN HUMAN SCH: 100 INJECTION, SOLUTION PARENTERAL at 12:23

## 2022-09-16 RX ADMIN — INSULIN HUMAN SCH: 100 INJECTION, SUSPENSION SUBCUTANEOUS at 17:50

## 2022-09-16 RX ADMIN — ONDANSETRON PRN MG: 2 INJECTION INTRAMUSCULAR; INTRAVENOUS at 21:27

## 2022-09-16 RX ADMIN — ACETAMINOPHEN PRN MG: 325 TABLET ORAL at 23:47

## 2022-09-16 RX ADMIN — INSULIN HUMAN SCH: 100 INJECTION, SOLUTION PARENTERAL at 17:13

## 2022-09-16 RX ADMIN — CLOPIDOGREL BISULFATE SCH MG: 75 TABLET ORAL at 09:23

## 2022-09-16 RX ADMIN — CILOSTAZOL SCH MG: 100 TABLET ORAL at 09:23

## 2022-09-16 RX ADMIN — INSULIN HUMAN SCH UNITS: 100 INJECTION, SOLUTION PARENTERAL at 08:22

## 2022-09-16 RX ADMIN — INSULIN HUMAN SCH UNIT: 100 INJECTION, SUSPENSION SUBCUTANEOUS at 09:22

## 2022-09-16 RX ADMIN — POVIDONE-IODINE SCH: 100 OINTMENT TOPICAL at 09:24

## 2022-09-16 RX ADMIN — Medication SCH ML: at 21:13

## 2022-09-16 RX ADMIN — INSULIN HUMAN SCH UNITS: 100 INJECTION, SOLUTION PARENTERAL at 23:47

## 2022-09-16 RX ADMIN — METRONIDAZOLE SCH MLS/HR: 5 INJECTION, SOLUTION INTRAVENOUS at 06:26

## 2022-09-16 RX ADMIN — INSULIN HUMAN SCH UNITS: 100 INJECTION, SOLUTION PARENTERAL at 00:08

## 2022-09-16 RX ADMIN — Medication SCH ML: at 09:23

## 2022-09-16 RX ADMIN — METRONIDAZOLE SCH MLS/HR: 5 INJECTION, SOLUTION INTRAVENOUS at 12:22

## 2022-09-16 RX ADMIN — CEFEPIME SCH MLS/HR: 1 INJECTION, POWDER, FOR SOLUTION INTRAMUSCULAR; INTRAVENOUS at 13:19

## 2022-09-16 RX ADMIN — CEFEPIME SCH MLS/HR: 1 INJECTION, POWDER, FOR SOLUTION INTRAMUSCULAR; INTRAVENOUS at 21:27

## 2022-09-16 RX ADMIN — INSULIN HUMAN SCH UNITS: 100 INJECTION, SOLUTION PARENTERAL at 21:11

## 2022-09-16 NOTE — PROGRESS NOTE
Assessment and Plan





Cultures:


9/10/2022 blood culture: No growth


9/13/2022 left third toe surgical culture: In process.  Gram stain appears mixed

with GPC in pairs, rare GNR.





A/P:


67-year-old male with diabetes, hypertension, peripheral vascular disease with 

previous history of RLE arterial bypass, right TMA, former smoker quit in 2006 

was admitted to the hospital with left third toe wound:





#Sepsis, secondary to diabetic foot infection, osteomyelitis of left foot third 

toe with abscess: Risk factors diabetes and peripheral vascular disease. WBC 

elevated. S/p revascularization on 9/12/2022, followed by toe amputation 

9/13/2022. Noted to have necrotic tissue.  MRI with abscess between the third 

and fourth toe also extensive bony destruction of the third fourth fifth and 

even the second toe. S/p TMA of second through fifth toes on 9/15/2022





#Thrombocytopenia: ?acute





#JASMEET: Improved.  Renally adjust antibiotics as needed.





#Peripheral vascular disease, history of RLE arterial bypass, prior right TMA. 

S/p revascularization on 9/12/2022.





#Diabetes mellitus type 2, uncontrolled





Recs:


-Continue IV cefepime + Daptomycin + Flagyl. Avoiding linezolid due to 

thrombocytopenia. 


-CK continues to downtrend, okay to continue IV daptomycin. Recheck on Monday


-Follow-up surgical culture to help decide final abx plan





Freddie Pineda MD, FACP, [facility] ([facility]


O: [phone]


F: [phone]


C: [phone]





Subjective


Date of service: 09/16/22


Principal diagnosis: PVD with critical limb


Interval history: 





No fever.  Went to the OR yesterday and underwent transmetatarsal amputation of 

the second through fifth toes.





Objective





- Exam


Narrative Exam: 





Physical Exam: 


Constitutional: Alert, cooperative. No acute distress


Head, Ears, Nose: Normocephalic, atraumatic. External ears, nose normal


Eyes: Conjunctivae/corneas clear. No icterus. No ptosis.


Neck: Supple, no meningeal signs


Cardiovascular: S1, S2 +


Respiratory: Good air entry, clear to auscultation bilaterally


GI: Soft, non-tender; bowel sounds normal. No peritoneal signs


Musculoskeletal: Right TMA well-healed, left foot dressing +


Skin: No rash or abscess


Hem/Lymphatic: No palpable cervical or supraclavicular nodes. No lymphangitis


Psych: Mood ok. Affect normal


Neurological: Awake, alert, oriented. No gross abnormality   





- Constitutional


Vitals: 


                                   Vital Signs











Temp Pulse Resp BP Pulse Ox


 


 97.4 F L  111 H  17   141/58   94 


 


 09/16/22 03:10  09/16/22 04:30  09/16/22 03:32  09/16/22 03:10  09/16/22 03:10








                           Temperature -Last 24 Hours











Temperature                    97.4 F


 


Temperature                    99.9 F


 


Temperature                    99.0 F


 


Temperature                    98.6 F


 


Temperature                    97.9 F


 


Temperature                    100.1 F


 


Temperature                    98.7 F

















- Labs


CBC & Chem 7: 


                                 09/14/22 06:04





                                 09/14/22 06:04


Labs: 


                              Abnormal lab results











  09/15/22 09/15/22 09/15/22 Range/Units





  04:51 07:53 11:09 


 


POC Glucose  189 H  242 H  269 H  ()  mg/dL














  09/15/22 09/15/22 09/15/22 Range/Units





  14:04 17:11 17:50 


 


POC Glucose  285 H  301 H  299 H  ()  mg/dL














  09/15/22 Range/Units





  21:18 


 


POC Glucose  331 H  ()  mg/dL

## 2022-09-16 NOTE — HEM/ONC CONSULTATION
History of Present Illness





- Reason for Consult


Consult date: 09/16/22


Thrombocytopenia





- History of Present Illness


Heme Consult Note


CPT 00008


Dx Thrombocytopenia








This is a 68yo male who presented to [facility] ED with worsening left foot infection.

Past medical history of diabetes, CKD, prior bypass of the RLE 10+ years ago, 

and stenting of the LLE from outside facility many years ago.  The patient 

states he has had an ulcer of the third toe of his left foot for the last 1 

month.  He regularly sees a podiatrist who referred him to vascular surgeon.  

The patient has undergone vascular work-up including ultrasound and diagnostic 

angiogram.  He was being scheduled for a revascularization procedure.   The 

patient was also started on doxycycline and instructed to apply Betadine to the 

wound daily for wound care.  The patient states that over the last 2 to 3 days 

his ulcer became rapidly worse and became infected.  He is noted a stronger odor

from the area and more drainage.  There is mild swelling of his foot.  In the 

emergency room work-up included lab work and a x-ray of the left foot.  Patient 

was noted to have a leukocytosis greater than 20 and blood sugar greater than 

500.  X-ray revealed possible osteomyelitis. S/p revascularization on 9/12/2022,

followed by toe amputation 9/13/2022. Noted to have necrotic tissue.  MRI with 

abscess between the third and fourth toe also extensive bony destruction of the 

third fourth fifth and even the second toe. S/p TMA of second through fifth toes

on 9/15/2022. Hematology was consulted for evaluation of thrombocytopenia. 








DATA REVIEWED BELOW 


IMP:


Thrombocytopenia secondary to sepsis, diabetic foot infection, osteomyelitis


--reports chronic low platelets, likely due to liver dysfunction


Macrocytic anemia secondary to liver dysfunction and sepsis


--noted with iron deficiency, 12% sat


--possible GI etiology or recent bleed





PLAN:


Monitor CBC


Transfuse 1 dose of plts whenever plts <20


Transfuse 1 unit pRBC whenever HCT <23


B12 level 


Liver U/S


IV Ferrlicet 125mg while inpatient


IV antibx


Consider GI eval





Case D/w Dr. Jasbir Coe.





                             Laboratory Last Values











WBC  27.9 K/mm3 (4.5-11.0)  H  09/14/22  06:04    


 


    


 


Hgb  7.9 gm/dl (11.8-15.2)  L  09/14/22  06:04    


 


Hct  24.7 % (35.5-45.6)  L  09/14/22  06:04    


 


MCV  100 fl (84-94)  H  09/14/22  06:04    


 


  


 


  


 


    


 


Plt Count  51 K/mm3 (140-440)  L  09/14/22  06:04    


 


  


 


Mono % (Auto)  11.9 % (0.0-7.3)  H  09/12/22  Unknown


 


  


 


  


 


  


 


Mono # (Auto)  2.1 K/mm3 (0.0-0.8)  H  09/12/22  Unknown


 


  


 


  


 


  


 


    


 


Seg Neutrophils %  70.7 % (40.0-70.0)  H  09/12/22  Unknown


 


Seg Neuts % (Manual)  82.0 % (40.0-70.0)  H  09/14/22  06:04    


 


   


 


Lymphocytes % (Manual)  8.0 % (13.4-35.0)  L  09/14/22  06:04    


 


     


 


Monocytes % (Manual)  10.0 % (0.0-7.3)  H  09/14/22  06:04    


 


  


 


  


 


  


 


  


 


  


 


  


 


     


 


Seg Neutrophils #  12.6 K/mm3 (1.8-7.7)  H  09/12/22  Unknown


 


Seg Neutrophils # Man  22.9 K/mm3 (1.8-7.7)  H  09/14/22  06:04    


 


  


 


  


 


    


 


Monocytes # (Manual)  2.8 K/mm3 (0.0-0.8)  H  09/14/22  06:04    


 


  


 


  


 


  


 


  


 


  


 


  


 


  


 


  


 


  


 


  


 


  


 


  


 


  


 


  


 


  


 


  


 


  


 


  


 


  


 


  


 


  


 


  


 


  


 


  


 


  


 


  


 


  


 


  


 


  


 


  


 


  


 


  


 


  


 


  


 


  


 


  


 


  


 


  


 


  


 


  


 


  


 


  


 


  


 


  


 


  


 


  


 


  


 


  


 


  


 


  


 


    


 


PT  14.5 Sec. (12.2-14.9)   09/12/22  Unknown


 


INR  0.99  (0.87-1.13)   09/12/22  Unknown


 


  


 


  


 


  


 


  


 


  


 


      


 


Creatinine  0.9 mg/dL (0.8-1.3)   09/14/22  06:04    


 


  


 


  


 


  


 


  


 


  


 


  


 


     


 


Iron  16 ug/dL ()  L  09/11/22  05:35    


 


TIBC  133 mcg/dL (250-450)  L  09/11/22  05:35    


 


Ferritin  2888.0 ng/mL (30.0-300.0)  H  09/11/22  05:35    


 


Total Bilirubin  0.70 mg/dL (0.1-1.2)   09/10/22  02:26    


 


AST  41 units/L (5-40)  H  09/10/22  02:26    


 


ALT  28 units/L (7-56)   09/10/22  02:26    


 


Alkaline Phosphatase  129 units/L ()   09/10/22  02:26    


 


Total Creatine Kinase  304 units/L ()  H  09/15/22  05:15    


 


  


 


  


 


  


 


  


 


  


 


  


 


  


 


  1    


 


Blood Type  O POSITIVE   09/11/22  Unknown








 





 





Past History


Past Medical History: diabetes, PVD (s/p RLE bypass 10+ years ago, occluded 

(pop-dp) ; s/p LLE SFA/pop stenting at outside facility years ago), renal 

failure (CKD)


Past Surgical History: Other (PVD surgery; transmetatarsal amputation of right 

foot)


Social history: no significant social history


Family history: no significant family history





Medications and Allergies


                                    Allergies











Allergy/AdvReac Type Severity Reaction Status Date / Time


 


No Known Allergies Allergy   Verified 09/13/22 13:17











                                Home Medications











 Medication  Instructions  Recorded  Confirmed  Last Taken  Type


 


Tamsulosin [Flomax] 0.4 mg PO QDAY 09/10/22 09/13/22 Unknown History


 


traZODone [Desyrel] 100 mg PO QHS 09/10/22 09/13/22 Unknown History


 


AtorvaSTATin [Lipitor] 40 mg PO QHS 09/13/22 09/13/22 Unknown History


 


Chlorthalidone [Thalitone] 25 mg PO QDAY 09/13/22 09/13/22 Unknown History


 


Insulin Glargine,Hum.rec.anlog 60 units SQ QDAY 09/13/22 09/13/22 Unknown 

History





[Lantus Solostar]     


 


Lisinopril [Zestril TAB] 30 mg PO QDAY 09/13/22 09/13/22 Unknown History


 


Lispro Insulin [HumaLOG] 10 units SQ TIDWM 09/13/22 09/13/22 Unknown History


 


Metoprolol [Lopressor] 100 mg PO BID 09/13/22 09/13/22 Unknown History


 


Multivitamin 1 each PO QDAY 09/13/22 09/13/22 Unknown History


 


Sertraline [Zoloft] 100 mg PO QDAY 09/13/22 09/13/22 Unknown History


 


Timolol Maleate/Pf [Timolol 1 drop OU BID 09/13/22 09/13/22 Unknown History





Maleate 0.5% Eye Drop]     


 


cilostazoL [Pletal] 100 mg PO BID 09/13/22 09/13/22 Unknown History











Active Meds: 


Active Medications





Acetaminophen (Acetaminophen 325 Mg Tab)  650 mg PO Q4H PRN


   PRN Reason: Pain MILD(1-3)/Fever >100.5/HA


   Last Admin: 09/12/22 01:31 Dose:  650 mg


   


Atorvastatin Calcium (Atorvastatin 40 Mg Tab)  40 mg PO DAILY [facility]


   Last Admin: 09/16/22 09:23 Dose:  40 mg


   


Cilostazol (Cilostazol 100 Mg Tab)  100 mg PO BID [facility]


   Last Admin: 09/16/22 09:23 Dose:  100 mg


   


Clopidogrel Bisulfate (Clopidogrel 75 Mg Tab)  75 mg PO QDAY [facility]


   Last Admin: 09/16/22 09:23 Dose:  75 mg


   


Dextrose (Dextrose 50% In Water (25gm) 50 Ml Syringe)  50 ml IV Q30MIN PRN; 

Protocol


   PRN Reason: Hypoglycemia


Heparin Sodium (Porcine) (Heparin 5,000 Unit/1 Ml Vial)  5,000 unit SUB-Q Q8HR 

[facility]


   Last Admin: 09/16/22 06:10 Dose:  5,000 unit


   


Hydromorphone HCl (Hydromorphone 0.5 Mg/0.5 Ml Inj)  0.5 mg IV Q10MIN PRN


   PRN Reason: Pain , Severe (7-10)


Sodium Chloride (Nacl 0.9% 1000 Ml)  1,000 mls @ 100 mls/hr IV AS DIRECT [facility]


   Last Admin: 09/15/22 18:13 Dose:  100 mls/hr


   


Daptomycin 500 mg/ Sodium (Chloride)  100 mls @ 200 mls/hr IV Q24H [facility]; Protocol


   Last Admin: 09/15/22 18:09 Dose:  200 mls/hr


   


Metronidazole (Flagyl 500 Mg/100 Ml)  500 mg in 100 mls @ 100 mls/hr IV Q8H [facility];

 Protocol


   Last Admin: 09/16/22 06:26 Dose:  100 mls/hr


   


Cefepime HCl (Cefepime/Ns 1 Gm/100 Ml)  1 gm in 100 mls @ 200 mls/hr IV Q8H [facility];

 Protocol


   Last Admin: 09/16/22 06:19 Dose:  200 mls/hr


   


Insulin Human Isoph/Insulin Regular (Insulin Nph/Regular 70/30 Inj)  25 unit 

SUB-Q BIDDIAB [facility]


   Last Admin: 09/16/22 09:22 Dose:  25 unit


   


Insulin Human Regular (Insulin Regular, Human 100 Units/1 Ml)  5 units SUB-Q 

ACHS [facility]


   Last Admin: 09/16/22 08:22 Dose:  5 units


   


Insulin Human Regular (Insulin Regular, Human 100 Units/1 Ml)  0 units SUB-Q Q6H

 [facility]; Protocol


   Last Admin: 09/16/22 07:03 Dose:  Not Given


   


Metoclopramide HCl (Metoclopramide 10 Mg/2 Ml Inj)  10 mg IV Q6H PRN


   PRN Reason: Nausea And Vomiting


   Last Admin: 09/12/22 22:50 Dose:  10 mg


   


Metoprolol Tartrate (Metoprolol Tartrate 100 Mg Tab)  100 mg PO BID [facility]


   Last Admin: 09/16/22 09:23 Dose:  100 mg


   


Morphine Sulfate (Morphine 4 Mg/1 Ml Inj)  2 mg IV Q4H PRN


   PRN Reason: Pain , Severe (7-10)


   Last Admin: 09/16/22 03:32 Dose:  2 mg


   


Nifedipine (Nifedipine Xl 30 Mg Tab)  30 mg PO QDAY [facility]


   Last Admin: 09/16/22 09:23 Dose:  30 mg


   


Ondansetron HCl (Ondansetron 4 Mg/2 Ml Inj)  4 mg IV Q8H PRN


   PRN Reason: Nausea And Vomiting


Oxycodone/Acetaminophen (Oxycodone /Acetaminophen 5-325mg Tab)  1 tab PO Q6H PRN


   PRN Reason: Pain, Moderate (4-6)


   Last Admin: 09/15/22 19:23 Dose:  1 tab


   


Povidone Iodine (Povidone-Iodine Ointment 28.35 Gm)  1 applic TP DAILY [facility]


   Last Admin: 09/16/22 09:24 Dose:  Not Given


   


Sodium Chloride (Sodium Chloride 0.9% 10 Ml Flush Syringe)  10 ml IV BID [facility]


   Last Admin: 09/16/22 09:23 Dose:  10 ml


   


Sodium Chloride (Sodium Chloride 0.9% 10 Ml Flush Syringe)  10 ml IV PRN PRN


   PRN Reason: LINE FLUSH











Exam





- Constitutional


Vitals: 


                                Last Vital Signs











Temp  98.7 F   09/16/22 08:10


 


Pulse  119 H  09/16/22 08:10


 


Resp  18   09/16/22 08:10


 


BP  174/56   09/16/22 08:10


 


Pulse Ox  98   09/16/22 08:10














Results





- Labs


lab Results: 


                         Laboratory Results - last 24 hr











  09/15/22 09/15/22 09/15/22





  04:51 07:53 11:09


 


POC Glucose  189 H  242 H  269 H














  09/15/22 09/15/22 09/15/22





  14:04 17:11 17:50


 


POC Glucose  285 H  301 H  299 H














  09/15/22 09/16/22 09/16/22





  21:18 07:02 08:10


 


POC Glucose  331 H  134 H  158 H

## 2022-09-16 NOTE — PROGRESS NOTE
Assessment and Plan


Assessment and plan: 





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


Diabetic foot infection with abscess of left third digit


Osteomyelitis of left foot


Peripheral arterial disease of bilateral lower extremities


WBC elevated. S/p revascularization on 9/12/2022, followed by toe amputation 9/ 13/2022. Noted to have necrotic tissue.  MRI with abscess between the third and 

fourth toe also extensive bony destruction of the third fourth fifth and even 

the second toe. S/p TMA of second through fifth toes on 9/15/2022





Insulin dependent type II diabetes mellitus with hyperglycemiaimproving


Worsened by diabetic foot infection


- hemoglobin A1c: 6.5


- home regimen: Unknown


- current regimen: NPH 25 units twice daily + moderate SSI +8 units regular 

insulin with meals


- blood glucose goal 140-180 while inpatient


- continue to monitor





Pseudohyponatremiaresolved





JASMEET on likely CKD stage IIIresolved


Status post aggressive IV fluid resuscitation


Renally dose meds and avoid nephrotoxic drugs.


Monitor BMP





Hypertension


- home medications: Unknown


- current medications: Started nifedipine 30 mg daily


- SBP goal <160 and DBP goal <90 while inpatient


- continue to monitor





Iron deficiency anemia


anemia of chronic disease


Hemoglobin 8.2


 Iron 16, TIBC 133, ferritin 2880.  Transfuse if hemoglobin <7





Acute thrombocytopenia


status post transfusion of 1 platelet on 9/11/2022


Patient endorses being aware but not having etiology determined by other 

clinicians.


-Consult hematology





Moderate protein caloric malnutrition


Albumin 3.6


Will initiate dietary supplementation once hyperglycemia is under control.





Disposition


Case management reports placement at a group home





History


Interval history: 





No new issues overnight





Hospitalist Physical





- Constitutional


Vitals: 


                                        











Temp Pulse Resp BP Pulse Ox


 


 98.7 F   119 H  18   174/56   98 


 


 09/16/22 08:10  09/16/22 08:10  09/16/22 08:10  09/16/22 08:10  09/16/22 08:10











General appearance: Present: no acute distress, well-nourished, malodorous 

(Malodorous wet gangrene of left foot with ulceration of third digit)





- EENT


Eyes: Present: PERRL, EOM intact


ENT: hearing intact, clear oral mucosa, dentition normal





- Neck


Neck: Present: supple, normal ROM





- Respiratory


Respiratory effort: normal


Respiratory: bilateral: CTA





- Cardiovascular


Rhythm: regular


Heart Sounds: Present: S1 & S2.  Absent: gallop, rub





- Extremities


Extremities: no ischemia, No edema, Full ROM





- Abdominal


General gastrointestinal: soft, non-tender, non-distended, normal bowel sounds





- Integumentary


Integumentary: Present: clear, warm, dry





- Neurologic


Neurologic: CNII-XII intact, moves all extremities





Results





- Labs


CBC & Chem 7: 


                                 09/14/22 06:04





                                 09/14/22 06:04


Labs: 


                             Laboratory Last Values











WBC  27.9 K/mm3 (4.5-11.0)  H  09/14/22  06:04    


 


RBC  2.48 M/mm3 (3.65-5.03)  L  09/14/22  06:04    


 


Hgb  7.9 gm/dl (11.8-15.2)  L  09/14/22  06:04    


 


Hct  24.7 % (35.5-45.6)  L  09/14/22  06:04    


 


MCV  100 fl (84-94)  H  09/14/22  06:04    


 


MCH  32 pg (28-32)   09/14/22  06:04    


 


MCHC  32 % (32-34)   09/14/22  06:04    


 


RDW  13.3 % (13.2-15.2)   09/14/22  06:04    


 


Plt Count  51 K/mm3 (140-440)  L  09/14/22  06:04    


 


Lymph % (Auto)  16.2 % (13.4-35.0)   09/12/22  Unknown


 


Mono % (Auto)  11.9 % (0.0-7.3)  H  09/12/22  Unknown


 


Eos % (Auto)  0.7 % (0.0-4.3)   09/12/22  Unknown


 


Baso % (Auto)  0.5 % (0.0-1.8)   09/12/22  Unknown


 


Lymph # (Auto)  2.9 K/mm3 (1.2-5.4)   09/12/22  Unknown


 


Mono # (Auto)  2.1 K/mm3 (0.0-0.8)  H  09/12/22  Unknown


 


Eos # (Auto)  0.1 K/mm3 (0.0-0.4)   09/12/22  Unknown


 


Baso # (Auto)  0.1 K/mm3 (0.0-0.1)   09/12/22  Unknown


 


Add Manual Diff  Complete   09/14/22  06:04    


 


Total Counted  100   09/14/22  06:04    


 


Seg Neutrophils %  70.7 % (40.0-70.0)  H  09/12/22  Unknown


 


Seg Neuts % (Manual)  82.0 % (40.0-70.0)  H  09/14/22  06:04    


 


Band Neutrophils %  0 %  09/14/22  06:04    


 


Lymphocytes % (Manual)  8.0 % (13.4-35.0)  L  09/14/22  06:04    


 


Reactive Lymphs % (Man)  0 %  09/14/22  06:04    


 


Monocytes % (Manual)  10.0 % (0.0-7.3)  H  09/14/22  06:04    


 


Eosinophils % (Manual)  0 % (0.0-4.3)   09/14/22  06:04    


 


Basophils % (Manual)  0 % (0.0-1.8)   09/14/22  06:04    


 


Metamyelocytes %  0 %  09/14/22  06:04    


 


Myelocytes %  0 %  09/14/22  06:04    


 


Promyelocytes %  0 %  09/14/22  06:04    


 


Blast Cells %  0 %  09/14/22  06:04    


 


Nucleated RBC %  Not Reportable   09/14/22  06:04    


 


Seg Neutrophils #  12.6 K/mm3 (1.8-7.7)  H  09/12/22  Unknown


 


Seg Neutrophils # Man  22.9 K/mm3 (1.8-7.7)  H  09/14/22  06:04    


 


Band Neutrophils #  0.0 K/mm3  09/14/22  06:04    


 


Lymphocytes # (Manual)  2.2 K/mm3 (1.2-5.4)   09/14/22  06:04    


 


Abs React Lymphs (Man)  0.0 K/mm3  09/14/22  06:04    


 


Monocytes # (Manual)  2.8 K/mm3 (0.0-0.8)  H  09/14/22  06:04    


 


Eosinophils # (Manual)  0.0 K/mm3 (0.0-0.4)   09/14/22  06:04    


 


Basophils # (Manual)  0.0 K/mm3 (0.0-0.1)   09/14/22  06:04    


 


Metamyelocytes #  0.0 K/mm3  09/14/22  06:04    


 


Myelocytes #  0.0 K/mm3  09/14/22  06:04    


 


Promyelocytes #  0.0 K/mm3  09/14/22  06:04    


 


Blast Cells #  0.0 K/mm3  09/14/22  06:04    


 


WBC Morphology  Not Reportable   09/14/22  06:04    


 


Hypersegmented Neuts  Not Reportable   09/14/22  06:04    


 


Hyposegmented Neuts  Not Reportable   09/14/22  06:04    


 


Hypogranular Neuts  Not Reportable   09/14/22  06:04    


 


Smudge Cells  Not Reportable   09/14/22  06:04    


 


Toxic Granulation  Not Reportable   09/14/22  06:04    


 


Toxic Vacuolation  Not Reportable   09/14/22  06:04    


 


Dohle Bodies  Not Reportable   09/14/22  06:04    


 


Pelger-Huet Anomaly  Not Reportable   09/14/22  06:04    


 


Olive Rods  Not Reportable   09/14/22  06:04    


 


Platelet Estimate  Consistent w auto   09/14/22  06:04    


 


Clumped Platelets  Not Reportable   09/14/22  06:04    


 


Plt Clumps, EDTA  Not Reportable   09/14/22  06:04    


 


Large Platelets  Not Reportable   09/14/22  06:04    


 


Giant Platelets  Not Reportable   09/14/22  06:04    


 


Platelet Satelliting  Not Reportable   09/14/22  06:04    


 


Plt Morphology Comment  Not Reportable   09/14/22  06:04    


 


RBC Morphology  Not Reportable   09/14/22  06:04    


 


Dimorphic RBCs  Not Reportable   09/14/22  06:04    


 


Polychromasia  Not Reportable   09/14/22  06:04    


 


Hypochromasia  1+   09/14/22  06:04    


 


Poikilocytosis  Not Reportable   09/14/22  06:04    


 


Anisocytosis  Not Reportable   09/14/22  06:04    


 


Microcytosis  Not Reportable   09/14/22  06:04    


 


Macrocytosis  Not Reportable   09/14/22  06:04    


 


Spherocytes  Not Reportable   09/14/22  06:04    


 


Pappenheimer Bodies  Not Reportable   09/14/22  06:04    


 


Sickle Cells  Not Reportable   09/14/22  06:04    


 


Target Cells  Not Reportable   09/14/22  06:04    


 


Tear Drop Cells  Not Reportable   09/14/22  06:04    


 


Ovalocytes  Not Reportable   09/14/22  06:04    


 


Helmet Cells  Not Reportable   09/14/22  06:04    


 


Soler-Ballinger Bodies  Not Reportable   09/14/22  06:04    


 


Cabot Rings  Not Reportable   09/14/22  06:04    


 


Jamison Cells  Not Reportable   09/14/22  06:04    


 


Bite Cells  Not Reportable   09/14/22  06:04    


 


Crenated Cell  Not Reportable   09/14/22  06:04    


 


Elliptocytes  Not Reportable   09/14/22  06:04    


 


Acanthocytes (Spur)  Not Reportable   09/14/22  06:04    


 


Rouleaux  Not Reportable   09/14/22  06:04    


 


Hemoglobin C Crystals  Not Reportable   09/14/22  06:04    


 


Schistocytes  Not Reportable   09/14/22  06:04    


 


Malaria parasites  Not Reportable   09/14/22  06:04    


 


Tej Bodies  Not Reportable   09/14/22  06:04    


 


Hem Pathologist Commnt  No   09/14/22  06:04    


 


PT  14.5 Sec. (12.2-14.9)   09/12/22  Unknown


 


INR  0.99  (0.87-1.13)   09/12/22  Unknown


 


Sodium  141 mmol/L (137-145)   09/14/22  06:04    


 


Potassium  4.4 mmol/L (3.6-5.0)   09/14/22  06:04    


 


Chloride  100.8 mmol/L ()   09/14/22  06:04    


 


Carbon Dioxide  23 mmol/L (22-30)   09/14/22  06:04    


 


Anion Gap  22 mmol/L  09/14/22  06:04    


 


BUN  13 mg/dL (9-20)   09/14/22  06:04    


 


Creatinine  0.9 mg/dL (0.8-1.3)   09/14/22  06:04    


 


Estimated GFR  > 60 ml/min  09/14/22  06:04    


 


BUN/Creatinine Ratio  14 %  09/14/22  06:04    


 


Glucose  64 mg/dL ()  L  09/14/22  06:04    


 


POC Glucose  158 mg/dL ()  H  09/16/22  08:10    


 


Hemoglobin A1c  6.5 % (4-6)  H  09/10/22  08:41    


 


Lactic Acid  1.50 mmol/L (0.7-2.0)   09/10/22  02:26    


 


Calcium  8.3 mg/dL (8.4-10.2)  L  09/14/22  06:04    


 


Iron  16 ug/dL ()  L  09/11/22  05:35    


 


TIBC  133 mcg/dL (250-450)  L  09/11/22  05:35    


 


Ferritin  2888.0 ng/mL (30.0-300.0)  H  09/11/22  05:35    


 


Total Bilirubin  0.70 mg/dL (0.1-1.2)   09/10/22  02:26    


 


AST  41 units/L (5-40)  H  09/10/22  02:26    


 


ALT  28 units/L (7-56)   09/10/22  02:26    


 


Alkaline Phosphatase  129 units/L ()   09/10/22  02:26    


 


Total Creatine Kinase  304 units/L ()  H  09/15/22  05:15    


 


Total Protein  6.9 g/dL (6.3-8.2)   09/10/22  02:26    


 


Albumin  3.6 g/dL (3.9-5)  L  09/10/22  02:26    


 


Albumin/Globulin Ratio  1.1 %  09/10/22  02:26    


 


Triglycerides  114 mg/dL (2-149)   09/10/22  08:41    


 


Cholesterol  116 mg/dL ()   09/10/22  08:41    


 


LDL Cholesterol Direct  46 mg/dL ()  L  09/10/22  08:41    


 


HDL Cholesterol  37 mg/dL (40-59)  L  09/10/22  08:41    


 


Cholesterol/HDL Ratio  3.13 %  09/10/22  08:41    


 


Blood Type  O POSITIVE   09/11/22  Unknown











Microbiology: 


Microbiology





09/13/22 15:32   Toe - Left Third   Surgical Culture - Preliminary








Toney/IV: 


                                        





Voiding Method                   Urinal











Active Medications





- Current Medications


Current Medications: 














Generic Name Dose Route Start Last Admin





  Trade Name Freq  PRN Reason Stop Dose Admin


 


Acetaminophen  650 mg  09/10/22 08:30  09/12/22 01:31





  Acetaminophen 325 Mg Tab  PO   650 mg





  Q4H PRN   Administration





  Pain MILD(1-3)/Fever >100.5/HA  


 


Atorvastatin Calcium  40 mg  09/13/22 11:00  09/16/22 09:23





  Atorvastatin 40 Mg Tab  PO   40 mg





  DAILY ELVIE   Administration


 


Cilostazol  100 mg  09/12/22 12:00  09/16/22 09:23





  Cilostazol 100 Mg Tab  PO   100 mg





  BID ELVIE   Administration


 


Clopidogrel Bisulfate  75 mg  09/13/22 10:00  09/16/22 09:23





  Clopidogrel 75 Mg Tab  PO   75 mg





  QDAY ELVIE   Administration


 


Dextrose  50 ml  09/10/22 08:35 





  Dextrose 50% In Water (25gm) 50 Ml Syringe  IV  





  Q30MIN PRN  





  Hypoglycemia  





  Protocol  


 


Heparin Sodium (Porcine)  5,000 unit  09/10/22 14:00  09/16/22 06:10





  Heparin 5,000 Unit/1 Ml Vial  SUB-Q   5,000 unit





  Q8HR ELVIE   Administration


 


Hydromorphone HCl  0.5 mg  09/15/22 13:51 





  Hydromorphone 0.5 Mg/0.5 Ml Inj  IV  





  Q10MIN PRN  





  Pain , Severe (7-10)  


 


Sodium Chloride  1,000 mls @ 100 mls/hr  09/10/22 05:30  09/15/22 18:13





  Nacl 0.9% 1000 Ml  IV   100 mls/hr





  AS DIRECT ELVIE   Administration


 


Daptomycin 500 mg/ Sodium  100 mls @ 200 mls/hr  09/13/22 18:00  09/15/22 18:09





  Chloride  IV   200 mls/hr





  Q24H ELVIE   Administration





  Protocol  


 


Metronidazole  500 mg in 100 mls @ 100 mls/hr  09/15/22 12:00  09/16/22 06:26





  Flagyl 500 Mg/100 Ml  IV   100 mls/hr





  Q8H ELVIE   Administration





  Protocol  


 


Cefepime HCl  1 gm in 100 mls @ 200 mls/hr  09/15/22 14:00  09/16/22 06:19





  Cefepime/Ns 1 Gm/100 Ml  IV   200 mls/hr





  Q8H ELVIE   Administration





  Protocol  


 


Insulin Human Isoph/Insulin Regular  25 unit  09/10/22 19:16  09/16/22 09:22





  Insulin Nph/Regular 70/30 Inj  SUB-Q   25 unit





  BIDDIAB ELVIE   Administration


 


Insulin Human Regular  5 units  09/10/22 22:00  09/16/22 08:22





  Insulin Regular, Human 100 Units/1 Ml  SUB-Q   5 units





  ACHS ELVIE   Administration


 


Insulin Human Regular  0 units  09/11/22 00:00  09/16/22 07:03





  Insulin Regular, Human 100 Units/1 Ml  SUB-Q   Not Given





  Q6H ELVIE  





  Protocol  


 


Metoclopramide HCl  10 mg  09/12/22 17:34  09/12/22 22:50





  Metoclopramide 10 Mg/2 Ml Inj  IV   10 mg





  Q6H PRN   Administration





  Nausea And Vomiting  


 


Metoprolol Tartrate  100 mg  09/13/22 11:00  09/16/22 09:23





  Metoprolol Tartrate 100 Mg Tab  PO   100 mg





  BID ELVIE   Administration


 


Morphine Sulfate  2 mg  09/10/22 08:30  09/16/22 03:32





  Morphine 4 Mg/1 Ml Inj  IV   2 mg





  Q4H PRN   Administration





  Pain , Severe (7-10)  


 


Nifedipine  30 mg  09/11/22 10:00  09/16/22 09:23





  Nifedipine Xl 30 Mg Tab  PO   30 mg





  QDAY ELVIE   Administration


 


Ondansetron HCl  4 mg  09/10/22 08:30 





  Ondansetron 4 Mg/2 Ml Inj  IV  





  Q8H PRN  





  Nausea And Vomiting  


 


Oxycodone/Acetaminophen  1 tab  09/10/22 08:30  09/15/22 19:23





  Oxycodone /Acetaminophen 5-325mg Tab  PO   1 tab





  Q6H PRN   Administration





  Pain, Moderate (4-6)  


 


Povidone Iodine  1 applic  09/10/22 15:00  09/16/22 09:24





  Povidone-Iodine Ointment 28.35 Gm  TP   Not Given





  DAILY ELVIE  


 


Sodium Chloride  10 ml  09/10/22 10:00  09/16/22 09:23





  Sodium Chloride 0.9% 10 Ml Flush Syringe  IV   10 ml





  BID ELVIE   Administration


 


Sodium Chloride  10 ml  09/10/22 08:30 





  Sodium Chloride 0.9% 10 Ml Flush Syringe  IV  





  PRN PRN  





  LINE FLUSH  














Nutrition/Malnutrition Assess





- Dietary Evaluation


Nutrition/Malnutrition Findings: 


                                        





Nutrition Notes                                            Start:  09/11/22 

11:33


Freq:                                                      Status: Active       




Protocol:                                                                       




 Document     09/15/22 13:45  CM  (Rec: 09/15/22 14:16  CM  [id number])


 Co-Sign      09/15/22 13:45  WW


 Nutrition Notes


     Initial or Follow up                        Assessment


     Current Diagnosis                           Diabetes,Hypertension


     Other Pertinent Diagnosis                   PVD s/p RLE bypass,


                                                 thrombocytopenia


     Current Diet                                NPO


     Labs/Tests                                  9/15:


                                                 Glu 64


     Pertinent Medications                       Atorvastatin


                                                 Others reviewed


     Height                                      5 ft 8 in


     Weight                                      72 kg


     Usual Body Weight                           75 kg


     Ideal Body Weight (kg)                      70.00


     BMI                                         24.1


     Intake Prior to Admission                   Good


     Weight change and time frame                No wt loss PTA per


                                                 malnutrition screening tool.


                                                 Pt reported uncertainty of wt


                                                 loss PTA.


     Weight Status                               Appropriate


     Subjective/Other Information                RD follow-up per protocol.


                                                 Diet %: 50% D 9/10, 100% D 9/ 12, 0% B 9/15 per ADL notes.


                                                 Pt reported a poor appetite


                                                 since admission, difficulty


                                                 chewing/swallowing (without


                                                 aspiration), mild abdominal


                                                 pain while eating, and average


                                                 consumption of 25%/meal.


                                                 Spoke w/ RN regarding


                                                 dysphagia/masticatory


                                                 difficulty - monitoring.


                                                 Last BM reported 2 days ago


                                                 per pt.


                                                 Nutrition-focused physical


                                                 assessment performed


                                                 indicating moderate muscle


                                                 wasting of the calves -


                                                 significant for mild


                                                 malnutrition.


                                                 Pt bedscale revealed pt is 78.


                                                 2kg.


     Burn                                        Absent


     Trauma                                      Absent


     GI Symptoms                                 None


     Difficulty In                               Swallowing,Chewing


     Food Allergy                                No


     Skin Integrity/Comment                      WNL (Sid 16)- s/p toe


                                                 amputation


     Current % PO                                Poor (25-49%)


     Minimum of two criteria                     No


     Muscle Mass                                 Moderate Depletion (severe)


     Fluid Accumulation                          N/A


     Reduced  Strength                       N/A (non-severe)


     Protein-Calorie Malnutrition                N\A


     #1


      Nutrition Diagnosis                        Malnutrition


      Comments:                                  Mild malnutrition in acute


                                                 setting.


      Etiology                                   Predicted undernutrition


      As Evidenced by Signs and Symptoms         Moderate (Calves) / mild (


                                                 [place]) muscle wasting.


     Is patient on ventilator?                   No


     Is Patient Ambulatory and/or Out of Bed     No


     REE-([facility]-confined to bed)      [phone]


     Kcal/Kg value to use for calculation        27


     Approximate Energy Requirements Using       1944


      kcal/Kg                                    


     Calculation Used for Recommendations        Kcal/kg


     Additional Notes                            1.2-1.5g/kg ABW; 86-108g PRO q


                                                 day


                                                 Fluid needs: 30mL/kg or per MD


                                                 .


 Nutrition Intervention


     Change Diet Order:                          Recommend diet to advance when


                                                 medically feasible.


     Add Supplement/Snack (indicate name/kcal    Glucerna BID (Chocolate)


      /protein )                                 


     Provides kCal:                              440


     Provides Protein (gm)                       20


     Goal #1                                     Diet to advance within 24-


                                                 48hrs.


     Follow-Up By:                               09/16/22


     Additional Comments                         Monitor diet advancement and


                                                 wt status.

## 2022-09-16 NOTE — ULTRASOUND REPORT
ULTRASOUND ABDOMEN, COMPLETE



INDICATION / CLINICAL INFORMATION: Evaluate for cirrhosis.



COMPARISON: None available.



FINDINGS:

PANCREAS: No significant abnormality.

ABDOMINAL AORTA: Mild/moderate atherosclerosis without aneurysmal dilation.

IVC: No significant abnormality.

LIVER: Mildly heterogeneous echogenicity. Normal hepatopedal blood flow in the main portal vein.

GALLBLADDER: Mildly distended with layering stones. No wall thickening or pericholecystic fluid.  

BILE DUCTS: No significant abnormality. Common bile duct measures 6 mm. 

KIDNEYS: Right: There is a 2.3 x 2.1 x 2.9 cm hypoechoic structure within the inferior pole of the ri
ght kidney, likely representing a simple cyst. Additional smaller hypoechoic focus demonstrates poste
rior acoustic enhancement and measures 1.4 x 1.3 x 1.6 cm  Left: No significant abnormality.

SPLEEN: No significant abnormality.



FREE FLUID: None.

ADDITIONAL FINDINGS: None.



IMPRESSION:

1. The liver is mildly heterogeneous in echogenicity, which can be seen in the setting of chronic hep
atocellular disease. 

2. Cholelithiasis within a mildly distended gallbladder without additional signs of cholecystitis.



Signer Name: Joselito White MD 

Signed: 9/16/2022 5:12 PM

Workstation Name: [email or web address]

## 2022-09-17 LAB
BAND NEUTROPHILS # (MANUAL): 0 K/MM3
BUN SERPL-MCNC: 15 MG/DL (ref 9–20)
BUN/CREAT SERPL: 17 %
CALCIUM SERPL-MCNC: 7.5 MG/DL (ref 8.4–10.2)
HCT VFR BLD CALC: 21 % (ref 35.5–45.6)
HEMOLYSIS INDEX: 0
HGB BLD-MCNC: 6.7 GM/DL (ref 11.8–15.2)
MCHC RBC AUTO-ENTMCNC: 32 % (ref 32–34)
MCV RBC AUTO: 100 FL (ref 84–94)
MYELOCYTES # (MANUAL): 0 K/MM3
PLATELET # BLD: 34 K/MM3 (ref 140–440)
PROMYELOCYTES # (MANUAL): 0 K/MM3
RBC # BLD AUTO: 2.11 M/MM3 (ref 3.65–5.03)
TOTAL CELLS COUNTED BLD: 100

## 2022-09-17 RX ADMIN — HEPARIN SODIUM SCH UNIT: 5000 INJECTION, SOLUTION INTRAVENOUS; SUBCUTANEOUS at 05:57

## 2022-09-17 RX ADMIN — CILOSTAZOL SCH MG: 100 TABLET ORAL at 10:23

## 2022-09-17 RX ADMIN — NIFEDIPINE SCH MG: 30 TABLET, FILM COATED, EXTENDED RELEASE ORAL at 10:23

## 2022-09-17 RX ADMIN — CEFEPIME SCH MLS/HR: 1 INJECTION, POWDER, FOR SOLUTION INTRAMUSCULAR; INTRAVENOUS at 23:16

## 2022-09-17 RX ADMIN — METRONIDAZOLE SCH MLS/HR: 5 INJECTION, SOLUTION INTRAVENOUS at 20:15

## 2022-09-17 RX ADMIN — OXYCODONE AND ACETAMINOPHEN PRN TAB: 5; 325 TABLET ORAL at 23:19

## 2022-09-17 RX ADMIN — INSULIN HUMAN SCH: 100 INJECTION, SOLUTION PARENTERAL at 22:00

## 2022-09-17 RX ADMIN — CILOSTAZOL SCH MG: 100 TABLET ORAL at 23:18

## 2022-09-17 RX ADMIN — INSULIN HUMAN SCH UNITS: 100 INJECTION, SOLUTION PARENTERAL at 06:19

## 2022-09-17 RX ADMIN — INSULIN HUMAN SCH UNITS: 100 INJECTION, SOLUTION PARENTERAL at 12:00

## 2022-09-17 RX ADMIN — CEFEPIME SCH MLS/HR: 1 INJECTION, POWDER, FOR SOLUTION INTRAMUSCULAR; INTRAVENOUS at 14:29

## 2022-09-17 RX ADMIN — CLOPIDOGREL BISULFATE SCH MG: 75 TABLET ORAL at 10:23

## 2022-09-17 RX ADMIN — INSULIN HUMAN SCH UNITS: 100 INJECTION, SOLUTION PARENTERAL at 08:00

## 2022-09-17 RX ADMIN — HEPARIN SODIUM SCH: 5000 INJECTION, SOLUTION INTRAVENOUS; SUBCUTANEOUS at 14:32

## 2022-09-17 RX ADMIN — ACETAMINOPHEN PRN MG: 325 TABLET ORAL at 20:15

## 2022-09-17 RX ADMIN — METOPROLOL TARTRATE SCH MG: 100 TABLET, FILM COATED ORAL at 10:23

## 2022-09-17 RX ADMIN — INSULIN HUMAN SCH UNIT: 100 INJECTION, SUSPENSION SUBCUTANEOUS at 10:23

## 2022-09-17 RX ADMIN — INSULIN HUMAN SCH UNITS: 100 INJECTION, SOLUTION PARENTERAL at 12:01

## 2022-09-17 RX ADMIN — MORPHINE SULFATE PRN MG: 4 INJECTION, SOLUTION INTRAMUSCULAR; INTRAVENOUS at 01:53

## 2022-09-17 RX ADMIN — OXYCODONE AND ACETAMINOPHEN PRN TAB: 5; 325 TABLET ORAL at 06:20

## 2022-09-17 RX ADMIN — INSULIN HUMAN SCH UNITS: 100 INJECTION, SOLUTION PARENTERAL at 17:57

## 2022-09-17 RX ADMIN — METOPROLOL TARTRATE SCH MG: 100 TABLET, FILM COATED ORAL at 23:17

## 2022-09-17 RX ADMIN — ONDANSETRON PRN MG: 2 INJECTION INTRAMUSCULAR; INTRAVENOUS at 20:16

## 2022-09-17 RX ADMIN — Medication SCH ML: at 23:18

## 2022-09-17 RX ADMIN — POVIDONE-IODINE SCH: 100 OINTMENT TOPICAL at 10:44

## 2022-09-17 RX ADMIN — SODIUM HYPOCHLORITE SCH DOSE: 2.5 SOLUTION TOPICAL at 23:39

## 2022-09-17 RX ADMIN — SODIUM HYPOCHLORITE SCH DOSE: 2.5 SOLUTION TOPICAL at 12:38

## 2022-09-17 RX ADMIN — INSULIN HUMAN SCH UNIT: 100 INJECTION, SUSPENSION SUBCUTANEOUS at 17:57

## 2022-09-17 RX ADMIN — Medication SCH ML: at 10:24

## 2022-09-17 RX ADMIN — MORPHINE SULFATE PRN MG: 4 INJECTION, SOLUTION INTRAMUSCULAR; INTRAVENOUS at 20:35

## 2022-09-17 RX ADMIN — CEFEPIME SCH MLS/HR: 1 INJECTION, POWDER, FOR SOLUTION INTRAMUSCULAR; INTRAVENOUS at 05:57

## 2022-09-17 RX ADMIN — METRONIDAZOLE SCH MLS/HR: 5 INJECTION, SOLUTION INTRAVENOUS at 04:07

## 2022-09-17 RX ADMIN — SODIUM CHLORIDE SCH MLS/HR: 0.9 INJECTION, SOLUTION INTRAVENOUS at 20:19

## 2022-09-17 RX ADMIN — SODIUM CHLORIDE SCH MLS/HR: 0.9 INJECTION, SOLUTION INTRAVENOUS at 04:07

## 2022-09-17 RX ADMIN — METRONIDAZOLE SCH MLS/HR: 5 INJECTION, SOLUTION INTRAVENOUS at 12:39

## 2022-09-17 NOTE — PROGRESS NOTE
Assessment and Plan





Postop day #2 status post transmetatarsal amputation of toes 2 through 5.  

Patient with elevated white count this morning 28,000 potassium also is a little

low at 2.9.  Intraoperative wound cultures remain pending.





Patient with minimal foot pain but complaining abdominal discomfort.  He has 

lost his appetite.  There may be some postprandial symptoms.  Ultrasound of the 

right upper quadrant is reviewed showing gallstones but no active signs of 

cholecystitis.





begin bid soaks in dakins soluiton. If abdo sx persist will check hida scan for 

biliary colic and chronic cholecystitis. Replace Potassium.





Subjective


Date of service: 09/17/22


Narrative: 





Postop day #2 status post transmetatarsal amputation of toes 2 through 5.  

Patient with elevated white count this morning 28,000 potassium also is a little

low at 2.9.  Intraoperative wound cultures remain pending.





Patient with minimal foot pain but complaining abdominal discomfort.  He has 

lost his appetite.  There may be some postprandial symptoms.  Ultrasound of the 

right upper quadrant is reviewed showing gallstones but no active signs of 

cholecystitis.





Objective


                               Vital Signs - 12hr











  09/16/22 09/16/22 09/16/22





  22:00 22:55 23:11


 


Temperature   100.2 F H


 


Pulse Rate  126 H 97 H


 


Respiratory 17  18





Rate   


 


Respiratory 17  





Rate [Left Foot   





]   


 


Blood Pressure  162/60 129/56


 


O2 Sat by Pulse 92  88





Oximetry   














  09/16/22 09/17/22 09/17/22





  23:47 00:00 00:47


 


Temperature   


 


Pulse Rate  120 H 


 


Respiratory 17  17





Rate   


 


Respiratory   





Rate [Left Foot   





]   


 


Blood Pressure   


 


O2 Sat by Pulse   





Oximetry   














  09/17/22 09/17/22 09/17/22





  01:53 02:23 03:23


 


Temperature   99.3 F


 


Pulse Rate   63


 


Respiratory 18 17 18





Rate   


 


Respiratory   





Rate [Left Foot   





]   


 


Blood Pressure   131/58


 


O2 Sat by Pulse   90





Oximetry   














  09/17/22 09/17/22 09/17/22





  04:00 04:47 06:20


 


Temperature   


 


Pulse Rate 94 H 89 


 


Respiratory   18





Rate   


 


Respiratory   





Rate [Left Foot   





]   


 


Blood Pressure   


 


O2 Sat by Pulse   





Oximetry   














  09/17/22





  07:20


 


Temperature 


 


Pulse Rate 


 


Respiratory 17





Rate 


 


Respiratory 





Rate [Left Foot 





] 


 


Blood Pressure 


 


O2 Sat by Pulse 





Oximetry 














- Labs





                                 09/17/22 07:58





                                 09/17/22 07:58


                                 Diabetes panel











  09/17/22 Range/Units





  07:58 


 


Sodium  136 L  (137-145)  mmol/L


 


Potassium  2.9 L* D  (3.6-5.0)  mmol/L


 


Chloride  98.8  ()  mmol/L


 


Carbon Dioxide  24  (22-30)  mmol/L


 


BUN  15  (9-20)  mg/dL


 


Creatinine  0.9  (0.8-1.3)  mg/dL


 


Glucose  217 H  ()  mg/dL


 


Calcium  7.5 L  (8.4-10.2)  mg/dL








                                  Calcium panel











  09/17/22 Range/Units





  07:58 


 


Calcium  7.5 L  (8.4-10.2)  mg/dL








                                 Pituitary panel











  09/17/22 Range/Units





  07:58 


 


Sodium  136 L  (137-145)  mmol/L


 


Potassium  2.9 L* D  (3.6-5.0)  mmol/L


 


Chloride  98.8  ()  mmol/L


 


Carbon Dioxide  24  (22-30)  mmol/L


 


BUN  15  (9-20)  mg/dL


 


Creatinine  0.9  (0.8-1.3)  mg/dL


 


Glucose  217 H  ()  mg/dL


 


Calcium  7.5 L  (8.4-10.2)  mg/dL








                                  Adrenal panel











  09/17/22 Range/Units





  07:58 


 


Sodium  136 L  (137-145)  mmol/L


 


Potassium  2.9 L* D  (3.6-5.0)  mmol/L


 


Chloride  98.8  ()  mmol/L


 


Carbon Dioxide  24  (22-30)  mmol/L


 


BUN  15  (9-20)  mg/dL


 


Creatinine  0.9  (0.8-1.3)  mg/dL


 


Glucose  217 H  ()  mg/dL


 


Calcium  7.5 L  (8.4-10.2)  mg/dL

## 2022-09-17 NOTE — PROGRESS NOTE
Assessment and Plan


Assessment and plan: 





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


Diabetic foot infection with abscess of left third digit


Osteomyelitis of left foot


Peripheral arterial disease of bilateral lower extremities


WBC elevated. S/p revascularization on 9/12/2022, followed by toe amputation 9/ 13/2022. Noted to have necrotic tissue.  MRI with abscess between the third and 

fourth toe also extensive bony destruction of the third fourth fifth and even 

the second toe. S/p TMA of second through fifth toes on 9/15/2022


Continue IV cefepime + Daptomycin + Flagyl. Avoiding linezolid due to thromb

ocytopenia. 


-ID to follow-up surgical culture to help decide final abx plan, patient may 

receive IV antibiotics at the group home





Diarrhea 


Check C. difficile





Hypokalemia 


Etiology likely secondary to diarrhea, check magnesium


Replete potassium





insulin dependent type II diabetes mellitus with hyperglycemiaimproving


Worsened by diabetic foot infection


- hemoglobin A1c: 6.5


- home regimen: Unknown


- current regimen: NPH 25 units twice daily + moderate SSI +8 units regular 

insulin with meals


- blood glucose goal 140-180 while inpatient


- continue to monitor





Pseudohyponatremiaresolved





JASMEET on likely CKD stage IIIresolved


Status post aggressive IV fluid resuscitation


Renally dose meds and avoid nephrotoxic drugs.


Monitor BMP





Hypertension


- home medications: Unknown


- current medications: Started nifedipine 30 mg daily


- SBP goal <160 and DBP goal <90 while inpatient


- continue to monitor





Iron deficiency anemia


anemia of chronic disease


Hemoglobin 8.2


 Iron 16, TIBC 133, ferritin 2880.  Transfuse if hemoglobin <7





Acute thrombocytopenia


status post transfusion of 1 platelet on 9/11/2022


Patient endorses being aware but not having etiology determined by other 

clinicians.


-Consult hematology





Moderate protein caloric malnutrition


Albumin 3.6


Will initiate dietary supplementation once hyperglycemia is under control.





Disposition


Case management reports placement at a group home.  Also, patient may receive IV

antibiotics at the group home per case management.





History


Interval history: 





No new issues overnight





Hospitalist Physical





- Constitutional


Vitals: 


                                        











Temp Pulse Resp BP Pulse Ox


 


 99.3 F   89   17   131/58   90 


 


 09/17/22 03:23  09/17/22 04:47  09/17/22 07:20  09/17/22 03:23  09/17/22 03:23











General appearance: Present: no acute distress, well-nourished, malodorous 

(Malodorous wet gangrene of left foot with ulceration of third digit)





- EENT


Eyes: Present: PERRL, EOM intact


ENT: hearing intact, clear oral mucosa, dentition normal





- Neck


Neck: Present: supple, normal ROM





- Respiratory


Respiratory effort: normal


Respiratory: bilateral: CTA





- Cardiovascular


Rhythm: regular


Heart Sounds: Present: S1 & S2.  Absent: gallop, rub





- Extremities


Extremities: no ischemia, No edema, Full ROM





- Abdominal


General gastrointestinal: soft, non-tender, non-distended, normal bowel sounds





- Integumentary


Integumentary: Present: clear, warm, dry





- Neurologic


Neurologic: CNII-XII intact, moves all extremities





Results





- Labs


CBC & Chem 7: 


                                 09/17/22 07:58





                                 09/17/22 07:58


Labs: 


                             Laboratory Last Values











WBC  29.4 K/mm3 (4.5-11.0)  H  09/17/22  07:58    


 


RBC  2.11 M/mm3 (3.65-5.03)  L  09/17/22  07:58    


 


Hgb  6.7 gm/dl (11.8-15.2)  L  09/17/22  07:58    


 


Hct  21.0 % (35.5-45.6)  L  09/17/22  07:58    


 


MCV  100 fl (84-94)  H  09/17/22  07:58    


 


MCH  32 pg (28-32)   09/17/22  07:58    


 


MCHC  32 % (32-34)   09/17/22  07:58    


 


RDW  13.5 % (13.2-15.2)   09/17/22  07:58    


 


Plt Count  34 K/mm3 (140-440)  L  09/17/22  07:58    


 


Lymph % (Auto)  16.2 % (13.4-35.0)   09/12/22  Unknown


 


Mono % (Auto)  11.9 % (0.0-7.3)  H  09/12/22  Unknown


 


Eos % (Auto)  0.7 % (0.0-4.3)   09/12/22  Unknown


 


Baso % (Auto)  0.5 % (0.0-1.8)   09/12/22  Unknown


 


Lymph # (Auto)  2.9 K/mm3 (1.2-5.4)   09/12/22  Unknown


 


Mono # (Auto)  2.1 K/mm3 (0.0-0.8)  H  09/12/22  Unknown


 


Eos # (Auto)  0.1 K/mm3 (0.0-0.4)   09/12/22  Unknown


 


Baso # (Auto)  0.1 K/mm3 (0.0-0.1)   09/12/22  Unknown


 


Add Manual Diff  Complete   09/14/22  06:04    


 


Total Counted  100   09/14/22  06:04    


 


Seg Neutrophils %  70.7 % (40.0-70.0)  H  09/12/22  Unknown


 


Seg Neuts % (Manual)  82.0 % (40.0-70.0)  H  09/14/22  06:04    


 


Band Neutrophils %  0 %  09/14/22  06:04    


 


Lymphocytes % (Manual)  8.0 % (13.4-35.0)  L  09/14/22  06:04    


 


Reactive Lymphs % (Man)  0 %  09/14/22  06:04    


 


Monocytes % (Manual)  10.0 % (0.0-7.3)  H  09/14/22  06:04    


 


Eosinophils % (Manual)  0 % (0.0-4.3)   09/14/22  06:04    


 


Basophils % (Manual)  0 % (0.0-1.8)   09/14/22  06:04    


 


Metamyelocytes %  0 %  09/14/22  06:04    


 


Myelocytes %  0 %  09/14/22  06:04    


 


Promyelocytes %  0 %  09/14/22  06:04    


 


Blast Cells %  0 %  09/14/22  06:04    


 


Nucleated RBC %  Not Reportable   09/14/22  06:04    


 


Seg Neutrophils #  12.6 K/mm3 (1.8-7.7)  H  09/12/22  Unknown


 


Seg Neutrophils # Man  22.9 K/mm3 (1.8-7.7)  H  09/14/22  06:04    


 


Band Neutrophils #  0.0 K/mm3  09/14/22  06:04    


 


Lymphocytes # (Manual)  2.2 K/mm3 (1.2-5.4)   09/14/22  06:04    


 


Abs React Lymphs (Man)  0.0 K/mm3  09/14/22  06:04    


 


Monocytes # (Manual)  2.8 K/mm3 (0.0-0.8)  H  09/14/22  06:04    


 


Eosinophils # (Manual)  0.0 K/mm3 (0.0-0.4)   09/14/22  06:04    


 


Basophils # (Manual)  0.0 K/mm3 (0.0-0.1)   09/14/22  06:04    


 


Metamyelocytes #  0.0 K/mm3  09/14/22  06:04    


 


Myelocytes #  0.0 K/mm3  09/14/22  06:04    


 


Promyelocytes #  0.0 K/mm3  09/14/22  06:04    


 


Blast Cells #  0.0 K/mm3  09/14/22  06:04    


 


WBC Morphology  Not Reportable   09/14/22  06:04    


 


Hypersegmented Neuts  Not Reportable   09/14/22  06:04    


 


Hyposegmented Neuts  Not Reportable   09/14/22  06:04    


 


Hypogranular Neuts  Not Reportable   09/14/22  06:04    


 


Smudge Cells  Not Reportable   09/14/22  06:04    


 


Toxic Granulation  Not Reportable   09/14/22  06:04    


 


Toxic Vacuolation  Not Reportable   09/14/22  06:04    


 


Dohle Bodies  Not Reportable   09/14/22  06:04    


 


Pelger-Huet Anomaly  Not Reportable   09/14/22  06:04    


 


Olive Rods  Not Reportable   09/14/22  06:04    


 


Platelet Estimate  Consistent w auto   09/14/22  06:04    


 


Clumped Platelets  Not Reportable   09/14/22  06:04    


 


Plt Clumps, EDTA  Not Reportable   09/14/22  06:04    


 


Large Platelets  Not Reportable   09/14/22  06:04    


 


Giant Platelets  Not Reportable   09/14/22  06:04    


 


Platelet Satelliting  Not Reportable   09/14/22  06:04    


 


Plt Morphology Comment  Not Reportable   09/14/22  06:04    


 


RBC Morphology  Not Reportable   09/14/22  06:04    


 


Dimorphic RBCs  Not Reportable   09/14/22  06:04    


 


Polychromasia  Not Reportable   09/14/22  06:04    


 


Hypochromasia  1+   09/14/22  06:04    


 


Poikilocytosis  Not Reportable   09/14/22  06:04    


 


Anisocytosis  Not Reportable   09/14/22  06:04    


 


Microcytosis  Not Reportable   09/14/22  06:04    


 


Macrocytosis  Not Reportable   09/14/22  06:04    


 


Spherocytes  Not Reportable   09/14/22  06:04    


 


Pappenheimer Bodies  Not Reportable   09/14/22  06:04    


 


Sickle Cells  Not Reportable   09/14/22  06:04    


 


Target Cells  Not Reportable   09/14/22  06:04    


 


Tear Drop Cells  Not Reportable   09/14/22  06:04    


 


Ovalocytes  Not Reportable   09/14/22  06:04    


 


Helmet Cells  Not Reportable   09/14/22  06:04    


 


Soler-Anniston Bodies  Not Reportable   09/14/22  06:04    


 


Cabot Rings  Not Reportable   09/14/22  06:04    


 


Ishpeming Cells  Not Reportable   09/14/22  06:04    


 


Bite Cells  Not Reportable   09/14/22  06:04    


 


Crenated Cell  Not Reportable   09/14/22  06:04    


 


Elliptocytes  Not Reportable   09/14/22  06:04    


 


Acanthocytes (Spur)  Not Reportable   09/14/22  06:04    


 


Rouleaux  Not Reportable   09/14/22  06:04    


 


Hemoglobin C Crystals  Not Reportable   09/14/22  06:04    


 


Schistocytes  Not Reportable   09/14/22  06:04    


 


Malaria parasites  Not Reportable   09/14/22  06:04    


 


Tej Bodies  Not Reportable   09/14/22  06:04    


 


Hem Pathologist Commnt  No   09/14/22  06:04    


 


PT  14.5 Sec. (12.2-14.9)   09/12/22  Unknown


 


INR  0.99  (0.87-1.13)   09/12/22  Unknown


 


Sodium  136 mmol/L (137-145)  L  09/17/22  07:58    


 


Potassium  2.9 mmol/L (3.6-5.0)  L* D 09/17/22  07:58    


 


Chloride  98.8 mmol/L ()   09/17/22  07:58    


 


Carbon Dioxide  24 mmol/L (22-30)   09/17/22  07:58    


 


Anion Gap  16 mmol/L  09/17/22  07:58    


 


BUN  15 mg/dL (9-20)   09/17/22  07:58    


 


Creatinine  0.9 mg/dL (0.8-1.3)   09/17/22  07:58    


 


Estimated GFR  > 60 ml/min  09/17/22  07:58    


 


BUN/Creatinine Ratio  17 %  09/17/22  07:58    


 


Glucose  217 mg/dL ()  H  09/17/22  07:58    


 


POC Glucose  175 mg/dL ()  H  09/17/22  06:01    


 


Hemoglobin A1c  6.5 % (4-6)  H  09/10/22  08:41    


 


Lactic Acid  1.50 mmol/L (0.7-2.0)   09/10/22  02:26    


 


Calcium  7.5 mg/dL (8.4-10.2)  L  09/17/22  07:58    


 


Iron  16 ug/dL ()  L  09/11/22  05:35    


 


TIBC  133 mcg/dL (250-450)  L  09/11/22  05:35    


 


Ferritin  2888.0 ng/mL (30.0-300.0)  H  09/11/22  05:35    


 


Total Bilirubin  0.70 mg/dL (0.1-1.2)   09/10/22  02:26    


 


AST  41 units/L (5-40)  H  09/10/22  02:26    


 


ALT  28 units/L (7-56)   09/10/22  02:26    


 


Alkaline Phosphatase  129 units/L ()   09/10/22  02:26    


 


Total Creatine Kinase  95 units/L ()   09/17/22  07:58    


 


Total Protein  6.9 g/dL (6.3-8.2)   09/10/22  02:26    


 


Albumin  3.6 g/dL (3.9-5)  L  09/10/22  02:26    


 


Albumin/Globulin Ratio  1.1 %  09/10/22  02:26    


 


Triglycerides  114 mg/dL (2-149)   09/10/22  08:41    


 


Cholesterol  116 mg/dL ()   09/10/22  08:41    


 


LDL Cholesterol Direct  46 mg/dL ()  L  09/10/22  08:41    


 


HDL Cholesterol  37 mg/dL (40-59)  L  09/10/22  08:41    


 


Cholesterol/HDL Ratio  3.13 %  09/10/22  08:41    


 


Blood Type  O POSITIVE   09/11/22  Unknown











Toney/IV: 


                                        





Voiding Method                   Condom Catheter











Active Medications





- Current Medications


Current Medications: 














Generic Name Dose Route Start Last Admin





  Trade Name Freq  PRN Reason Stop Dose Admin


 


Acetaminophen  650 mg  09/10/22 08:30  09/16/22 23:47





  Acetaminophen 325 Mg Tab  PO   650 mg





  Q4H PRN   Administration





  Pain MILD(1-3)/Fever >100.5/HA  


 


Atorvastatin Calcium  40 mg  09/13/22 11:00  09/16/22 09:23





  Atorvastatin 40 Mg Tab  PO   40 mg





  DAILY ELVIE   Administration


 


Cilostazol  100 mg  09/12/22 12:00  09/16/22 21:12





  Cilostazol 100 Mg Tab  PO   100 mg





  BID ELVIE   Administration


 


Clopidogrel Bisulfate  75 mg  09/13/22 10:00  09/16/22 09:23





  Clopidogrel 75 Mg Tab  PO   75 mg





  QDAY ELVIE   Administration


 


Dextrose  50 ml  09/10/22 08:35 





  Dextrose 50% In Water (25gm) 50 Ml Syringe  IV  





  Q30MIN PRN  





  Hypoglycemia  





  Protocol  


 


Heparin Sodium (Porcine)  5,000 unit  09/10/22 14:00  09/17/22 05:57





  Heparin 5,000 Unit/1 Ml Vial  SUB-Q   5,000 unit





  Q8HR ELVIE   Administration


 


Hydromorphone HCl  0.5 mg  09/15/22 13:51 





  Hydromorphone 0.5 Mg/0.5 Ml Inj  IV  





  Q10MIN PRN  





  Pain , Severe (7-10)  


 


Sodium Chloride  1,000 mls @ 100 mls/hr  09/10/22 05:30  09/17/22 04:07





  Nacl 0.9% 1000 Ml  IV   100 mls/hr





  AS DIRECT ELVIE   Administration


 


Daptomycin 500 mg/ Sodium  100 mls @ 200 mls/hr  09/13/22 18:00  09/16/22 17:42





  Chloride  IV   200 mls/hr





  Q24H ELVIE   Administration





  Protocol  


 


Metronidazole  500 mg in 100 mls @ 100 mls/hr  09/15/22 12:00  09/17/22 04:07





  Flagyl 500 Mg/100 Ml  IV   100 mls/hr





  Q8H ELVIE   Administration





  Protocol  


 


Cefepime HCl  1 gm in 100 mls @ 200 mls/hr  09/15/22 14:00  09/17/22 05:57





  Cefepime/Ns 1 Gm/100 Ml  IV   200 mls/hr





  Q8H ELVIE   Administration





  Protocol  


 


Insulin Human Isoph/Insulin Regular  25 unit  09/10/22 19:16  09/16/22 17:50





  Insulin Nph/Regular 70/30 Inj  SUB-Q   Not Given





  BIDDIAB ELVIE  


 


Insulin Human Regular  5 units  09/10/22 22:00  09/16/22 21:11





  Insulin Regular, Human 100 Units/1 Ml  SUB-Q   5 units





  [facility]HS ELVIE   Administration


 


Insulin Human Regular  0 units  09/17/22 07:30 





  Insulin Regular, Human 100 Units/1 Ml  SUB-Q  





  [facility]  





  Protocol  


 


Metoclopramide HCl  10 mg  09/12/22 17:34  09/12/22 22:50





  Metoclopramide 10 Mg/2 Ml Inj  IV   10 mg





  Q6H PRN   Administration





  Nausea And Vomiting  


 


Metoprolol Tartrate  100 mg  09/13/22 11:00  09/16/22 22:55





  Metoprolol Tartrate 100 Mg Tab  PO   100 mg





  BID ELVIE   Administration


 


Morphine Sulfate  2 mg  09/10/22 08:30  09/17/22 01:53





  Morphine 4 Mg/1 Ml Inj  IV   2 mg





  Q4H PRN   Administration





  Pain , Severe (7-10)  


 


Nifedipine  30 mg  09/11/22 10:00  09/16/22 09:23





  Nifedipine Xl 30 Mg Tab  PO   30 mg





  QDAY ELVIE   Administration


 


Ondansetron HCl  4 mg  09/10/22 08:30  09/16/22 21:27





  Ondansetron 4 Mg/2 Ml Inj  IV   4 mg





  Q8H PRN   Administration





  Nausea And Vomiting  


 


Oxycodone/Acetaminophen  1 tab  09/10/22 08:30  09/17/22 06:20





  Oxycodone /Acetaminophen 5-325mg Tab  PO   1 tab





  Q6H PRN   Administration





  Pain, Moderate (4-6)  


 


Sodium Chloride  10 ml  09/10/22 10:00  09/16/22 21:13





  Sodium Chloride 0.9% 10 Ml Flush Syringe  IV   10 ml





  BID ELVIE   Administration


 


Sodium Chloride  10 ml  09/10/22 08:30 





  Sodium Chloride 0.9% 10 Ml Flush Syringe  IV  





  PRN PRN  





  LINE FLUSH  


 


Sodium Hypochlorite  1 applic  09/17/22 10:00 





  Sodium Hypochlorite, Dakin's 1/2 Strength (0.25%) 473 Ml Topical Soln  TP  





  BID [facility]  














Nutrition/Malnutrition Assess





- Dietary Evaluation


Nutrition/Malnutrition Findings: 


                                        





Nutrition Notes                                            Start:  09/11/22 

11:33


Freq:                                                      Status: Active       




Protocol:                                                                       




 Document     09/16/22 13:47  CM  (Rec: 09/16/22 13:56  CM  [id number])


 Co-Sign      09/16/22 13:47  WW


 Nutrition Notes


     Initial or Follow up                        Brief Note


     Current Diagnosis                           Diabetes,Hypertension


     Other Pertinent Diagnosis                   PVD s/p RLE bypass,


                                                 thrombocytopenia


     Current Diet                                Cardiac Diet + Glucerna BID


     Labs/Tests                                  9/16:


                                                 Glu 158


     Pertinent Medications                       9/16:


                                                 Atorvastatin


                                                 Humulin 70/30


                                                 Humulin R


     Height                                      5 ft 8 in


     Weight                                      70.1 kg


     Usual Body Weight                           75 kg


     Ideal Body Weight (kg)                      70.00


     BMI                                         23.5


     Weight change and time frame                2% wt loss in 1 day - will


                                                 monitor.


     Weight Status                               Appropriate


     Subjective/Other Information                RD follow-up per protocol.


                                                 Pt advanced to cardiac diet


                                                 and Glucerna BID ordered.


                                                 100% of dinner consumed 9/15.


                                                 New wt added today.


     Percent of energy/protein needs met:        Cardiac Diet provides 2230kcal


                                                 / 85g PRO q day - 113% Kcal /


                                                 99% AA before nutrition


                                                 supplements.


     GI Symptoms                                 None


     #1


      Nutrition Diagnosis                        Malnutrition


      Diagnosis Progress(for reassessment        Continues


       documentation)                            


     Is patient on ventilator?                   No


     Is Patient Ambulatory and/or Out of Bed     No


     REE-([facility]-confined to bed)      [phone]


     Kcal/Kg value to use for calculation        28


     Approximate Energy Requirements Using       1963


      kcal/Kg                                    


     Calculation Used for Recommendations        Kcal/kg


     Additional Notes                            1.2-1.5g/kg ABW; 86-108g PRO q


                                                 day


                                                 Fluid needs: 30mL/kg or per MD


                                                 .


 Nutrition Intervention


     Change Diet Order:                          Continue current diet order or


                                                 per SLP recommendations.


     Add Supplement/Snack (indicate name/kcal    Glucerna BID (Chocolate)


      /protein )                                 


     Provides kCal:                              440


     Provides Protein (gm)                       20


     Goal #1                                     Pt to consume >75% of


                                                 estimated energy/protein needs


                                                 through current diet order


                                                 and nutrition supplements


     Goal #2                                     Pt to maintain current wt


                                                 status within 2.5% throughout


                                                 LOS.


     Follow-Up By:                               09/23/22


     Additional Comments                         Monitor % PO intake, diet


                                                 tolerance, and wt status.

## 2022-09-18 LAB
BUN SERPL-MCNC: 12 MG/DL (ref 9–20)
BUN/CREAT SERPL: 12 %
CALCIUM SERPL-MCNC: 7.2 MG/DL (ref 8.4–10.2)
HCO3 BLDA-SCNC: 24.4 MMOL/L (ref 20–26)
HEMOLYSIS INDEX: 0
PCO2 BLDA: 30 MM HG
PH BLDA: 7.53 PH UNITS (ref 7.35–7.45)
PO2 BLDA: 125 MM HG (ref 80–90)

## 2022-09-18 PROCEDURE — 5A0945A ASSISTANCE WITH RESPIRATORY VENTILATION, 24-96 CONSECUTIVE HOURS, HIGH NASAL FLOW/VELOCITY: ICD-10-PCS | Performed by: STUDENT IN AN ORGANIZED HEALTH CARE EDUCATION/TRAINING PROGRAM

## 2022-09-18 PROCEDURE — 30233N1 TRANSFUSION OF NONAUTOLOGOUS RED BLOOD CELLS INTO PERIPHERAL VEIN, PERCUTANEOUS APPROACH: ICD-10-PCS | Performed by: STUDENT IN AN ORGANIZED HEALTH CARE EDUCATION/TRAINING PROGRAM

## 2022-09-18 PROCEDURE — 4A033R1 MEASUREMENT OF ARTERIAL SATURATION, PERIPHERAL, PERCUTANEOUS APPROACH: ICD-10-PCS | Performed by: STUDENT IN AN ORGANIZED HEALTH CARE EDUCATION/TRAINING PROGRAM

## 2022-09-18 RX ADMIN — DEXTROSE MONOHYDRATE PRN ML: 25 INJECTION, SOLUTION INTRAVENOUS at 16:13

## 2022-09-18 RX ADMIN — CILOSTAZOL SCH MG: 100 TABLET ORAL at 21:59

## 2022-09-18 RX ADMIN — METRONIDAZOLE SCH MLS/HR: 5 INJECTION, SOLUTION INTRAVENOUS at 04:24

## 2022-09-18 RX ADMIN — CLOPIDOGREL BISULFATE SCH MG: 75 TABLET ORAL at 09:50

## 2022-09-18 RX ADMIN — Medication SCH ML: at 09:50

## 2022-09-18 RX ADMIN — METOPROLOL TARTRATE SCH MG: 100 TABLET, FILM COATED ORAL at 22:05

## 2022-09-18 RX ADMIN — ONDANSETRON PRN MG: 2 INJECTION INTRAMUSCULAR; INTRAVENOUS at 19:18

## 2022-09-18 RX ADMIN — METOCLOPRAMIDE PRN MG: 5 INJECTION, SOLUTION INTRAMUSCULAR; INTRAVENOUS at 20:54

## 2022-09-18 RX ADMIN — SODIUM HYPOCHLORITE SCH DOSE: 2.5 SOLUTION TOPICAL at 09:50

## 2022-09-18 RX ADMIN — INSULIN HUMAN SCH: 100 INJECTION, SOLUTION PARENTERAL at 11:43

## 2022-09-18 RX ADMIN — SODIUM CHLORIDE SCH MLS/HR: 0.9 INJECTION, SOLUTION INTRAVENOUS at 12:47

## 2022-09-18 RX ADMIN — INSULIN HUMAN SCH: 100 INJECTION, SOLUTION PARENTERAL at 22:03

## 2022-09-18 RX ADMIN — INSULIN HUMAN SCH: 100 INJECTION, SUSPENSION SUBCUTANEOUS at 16:39

## 2022-09-18 RX ADMIN — Medication SCH: at 22:06

## 2022-09-18 RX ADMIN — CEFEPIME SCH MLS/HR: 1 INJECTION, POWDER, FOR SOLUTION INTRAMUSCULAR; INTRAVENOUS at 06:05

## 2022-09-18 RX ADMIN — METRONIDAZOLE SCH MLS/HR: 5 INJECTION, SOLUTION INTRAVENOUS at 19:24

## 2022-09-18 RX ADMIN — OXYCODONE AND ACETAMINOPHEN PRN TAB: 5; 325 TABLET ORAL at 09:49

## 2022-09-18 RX ADMIN — INSULIN HUMAN SCH: 100 INJECTION, SOLUTION PARENTERAL at 12:39

## 2022-09-18 RX ADMIN — NIFEDIPINE SCH MG: 30 TABLET, FILM COATED, EXTENDED RELEASE ORAL at 09:50

## 2022-09-18 RX ADMIN — INSULIN HUMAN SCH: 100 INJECTION, SOLUTION PARENTERAL at 22:04

## 2022-09-18 RX ADMIN — CILOSTAZOL SCH MG: 100 TABLET ORAL at 09:50

## 2022-09-18 RX ADMIN — MORPHINE SULFATE PRN MG: 4 INJECTION, SOLUTION INTRAMUSCULAR; INTRAVENOUS at 21:53

## 2022-09-18 RX ADMIN — METOPROLOL TARTRATE SCH MG: 100 TABLET, FILM COATED ORAL at 09:50

## 2022-09-18 RX ADMIN — CEFEPIME SCH MLS/HR: 1 INJECTION, POWDER, FOR SOLUTION INTRAMUSCULAR; INTRAVENOUS at 22:11

## 2022-09-18 RX ADMIN — INSULIN HUMAN SCH: 100 INJECTION, SOLUTION PARENTERAL at 16:39

## 2022-09-18 RX ADMIN — INSULIN HUMAN SCH: 100 INJECTION, SOLUTION PARENTERAL at 16:38

## 2022-09-18 RX ADMIN — INSULIN HUMAN SCH UNIT: 100 INJECTION, SUSPENSION SUBCUTANEOUS at 09:49

## 2022-09-18 RX ADMIN — ACETAMINOPHEN PRN MG: 325 TABLET ORAL at 20:37

## 2022-09-18 RX ADMIN — METRONIDAZOLE SCH MLS/HR: 5 INJECTION, SOLUTION INTRAVENOUS at 12:39

## 2022-09-18 RX ADMIN — CEFEPIME SCH MLS/HR: 1 INJECTION, POWDER, FOR SOLUTION INTRAMUSCULAR; INTRAVENOUS at 13:57

## 2022-09-18 RX ADMIN — INSULIN HUMAN SCH: 100 INJECTION, SOLUTION PARENTERAL at 09:47

## 2022-09-18 NOTE — XRAY REPORT
CHEST 1 VIEW 



INDICATION / CLINICAL INFORMATION:

fever and oxygen saturation low.



COMPARISON: 

9/10/2022



FINDINGS:



SUPPORT DEVICES: None.



HEART / MEDIASTINUM: No significant abnormality. 



LUNGS / PLEURA: Mild interstitial prominence has developed bilaterally with appearance most suggestiv
e of interstitial pulmonary edema. Trace bilateral pleural effusions are most likely present as well.
 No focal area of consolidation noted. No pneumothorax. 



ADDITIONAL FINDINGS: No significant additional findings.



IMPRESSION:

1. Findings most compatible with interval development of mild interstitial pulmonary edema and trace 
bilateral pleural effusions.



Signer Name: Ruby Mendoza MD 

Signed: 9/18/2022 3:39 AM

Workstation Name: [email or web address]

## 2022-09-18 NOTE — PROGRESS NOTE
Assessment and Plan


Assessment and plan: 





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


Diabetic foot infection with abscess of left third digit


Osteomyelitis of left foot


Peripheral arterial disease of bilateral lower extremities


WBC elevated. S/p revascularization on 9/12/2022, followed by toe amputation 9/ 13/2022. Noted to have necrotic tissue.  MRI with abscess between the third and 

fourth toe also extensive bony destruction of the third fourth fifth and even 

the second toe. S/p TMA of second through fifth toes on 9/15/2022


Continue IV cefepime + Daptomycin + Flagyl. Avoiding linezolid due to thromb

ocytopenia. 


-ID to follow-up surgical culture to help decide final abx plan, patient may 

receive IV antibiotics at the group home


-Patient with fever of 101.5.





Diarrhea 


Check C. difficile





Hypokalemia 


Etiology likely secondary to diarrhea, check magnesium


Replete potassium





insulin dependent type II diabetes mellitus with hyperglycemiaimproving


Worsened by diabetic foot infection


- hemoglobin A1c: 6.5


- home regimen: Unknown


- current regimen: NPH 25 units twice daily + moderate SSI +8 units regular 

insulin with meals


- blood glucose goal 140-180 while inpatient


- continue to monitor





Pseudohyponatremiaresolved





JASMEET on likely CKD stage IIIresolved


Status post aggressive IV fluid resuscitation


Renally dose meds and avoid nephrotoxic drugs.


Monitor BMP





Hypertension


- home medications: Unknown


- current medications: Started nifedipine 30 mg daily


- SBP goal <160 and DBP goal <90 while inpatient


- continue to monitor





Iron deficiency anemia


anemia of chronic disease


Hemoglobin 6.7


 We will transfuse 1 unit PRBCs





Acute thrombocytopenia


status post transfusion of 1 platelet on 9/11/2022


Patient endorses being aware but not having etiology determined by other 

clinicians.


-Consult hematology





Moderate protein caloric malnutrition


Albumin 3.6


Will initiate dietary supplementation once hyperglycemia is under control.





Disposition


Case management reports placement at a group home.  Also, patient may receive IV

antibiotics at the group home per case management.





History


Interval history: 





No new issues overnight





Hospitalist Physical





- Constitutional


Vitals: 


                                        











Temp Pulse Resp BP Pulse Ox


 


 97.7 F   86   19   144/52   97 


 


 09/18/22 03:33  09/18/22 03:33  09/18/22 03:33  09/18/22 03:33  09/18/22 03:33











General appearance: Present: no acute distress, well-nourished, malodorous 

(Malodorous wet gangrene of left foot with ulceration of third digit)





- EENT


Eyes: Present: PERRL, EOM intact


ENT: hearing intact, clear oral mucosa, dentition normal





- Neck


Neck: Present: supple, normal ROM





- Respiratory


Respiratory effort: normal


Respiratory: bilateral: CTA





- Cardiovascular


Rhythm: regular


Heart Sounds: Present: S1 & S2.  Absent: gallop, rub





- Extremities


Extremities: no ischemia, No edema, Full ROM





- Abdominal


General gastrointestinal: soft, non-tender, non-distended, normal bowel sounds





- Integumentary


Integumentary: Present: clear, warm, dry





- Neurologic


Neurologic: CNII-XII intact, moves all extremities





Results





- Labs


CBC & Chem 7: 


                                 09/17/22 07:58





                                 09/17/22 07:58


Labs: 


                             Laboratory Last Values











WBC  29.4 K/mm3 (4.5-11.0)  H  09/17/22  07:58    


 


RBC  2.11 M/mm3 (3.65-5.03)  L  09/17/22  07:58    


 


Hgb  6.7 gm/dl (11.8-15.2)  L  09/17/22  07:58    


 


Hct  21.0 % (35.5-45.6)  L  09/17/22  07:58    


 


MCV  100 fl (84-94)  H  09/17/22  07:58    


 


MCH  32 pg (28-32)   09/17/22  07:58    


 


MCHC  32 % (32-34)   09/17/22  07:58    


 


RDW  13.5 % (13.2-15.2)   09/17/22  07:58    


 


Plt Count  34 K/mm3 (140-440)  L  09/17/22  07:58    


 


Lymph % (Auto)  16.2 % (13.4-35.0)   09/12/22  Unknown


 


Mono % (Auto)  11.9 % (0.0-7.3)  H  09/12/22  Unknown


 


Eos % (Auto)  0.7 % (0.0-4.3)   09/12/22  Unknown


 


Baso % (Auto)  0.5 % (0.0-1.8)   09/12/22  Unknown


 


Lymph # (Auto)  2.9 K/mm3 (1.2-5.4)   09/12/22  Unknown


 


Mono # (Auto)  2.1 K/mm3 (0.0-0.8)  H  09/12/22  Unknown


 


Eos # (Auto)  0.1 K/mm3 (0.0-0.4)   09/12/22  Unknown


 


Baso # (Auto)  0.1 K/mm3 (0.0-0.1)   09/12/22  Unknown


 


Add Manual Diff  Complete   09/17/22  07:58    


 


Total Counted  100   09/17/22  07:58    


 


Seg Neutrophils %  70.7 % (40.0-70.0)  H  09/12/22  Unknown


 


Seg Neuts % (Manual)  86.0 % (40.0-70.0)  H  09/17/22  07:58    


 


Band Neutrophils %  0 %  09/17/22  07:58    


 


Lymphocytes % (Manual)  4.0 % (13.4-35.0)  L  09/17/22  07:58    


 


Reactive Lymphs % (Man)  0 %  09/17/22  07:58    


 


Monocytes % (Manual)  10.0 % (0.0-7.3)  H  09/17/22  07:58    


 


Eosinophils % (Manual)  0 % (0.0-4.3)   09/17/22  07:58    


 


Basophils % (Manual)  0 % (0.0-1.8)   09/17/22  07:58    


 


Metamyelocytes %  0 %  09/17/22  07:58    


 


Myelocytes %  0 %  09/17/22  07:58    


 


Promyelocytes %  0 %  09/17/22  07:58    


 


Blast Cells %  0 %  09/17/22  07:58    


 


Nucleated RBC %  Not Reportable   09/17/22  07:58    


 


Seg Neutrophils #  12.6 K/mm3 (1.8-7.7)  H  09/12/22  Unknown


 


Seg Neutrophils # Man  25.3 K/mm3 (1.8-7.7)  H  09/17/22  07:58    


 


Band Neutrophils #  0.0 K/mm3  09/17/22  07:58    


 


Lymphocytes # (Manual)  1.2 K/mm3 (1.2-5.4)   09/17/22  07:58    


 


Abs React Lymphs (Man)  0.0 K/mm3  09/17/22  07:58    


 


Monocytes # (Manual)  2.9 K/mm3 (0.0-0.8)  H  09/17/22  07:58    


 


Eosinophils # (Manual)  0.0 K/mm3 (0.0-0.4)   09/17/22  07:58    


 


Basophils # (Manual)  0.0 K/mm3 (0.0-0.1)   09/17/22  07:58    


 


Metamyelocytes #  0.0 K/mm3  09/17/22  07:58    


 


Myelocytes #  0.0 K/mm3  09/17/22  07:58    


 


Promyelocytes #  0.0 K/mm3  09/17/22  07:58    


 


Blast Cells #  0.0 K/mm3  09/17/22  07:58    


 


WBC Morphology  Not Reportable   09/17/22  07:58    


 


WBC Morphology  TNR   09/17/22  07:58    


 


Hypersegmented Neuts  Not Reportable   09/17/22  07:58    


 


Hyposegmented Neuts  Not Reportable   09/17/22  07:58    


 


Hypogranular Neuts  Not Reportable   09/17/22  07:58    


 


Smudge Cells  Not Reportable   09/17/22  07:58    


 


Toxic Granulation  Not Reportable   09/17/22  07:58    


 


Toxic Vacuolation  Not Reportable   09/17/22  07:58    


 


Dohle Bodies  Not Reportable   09/17/22  07:58    


 


Pelger-Huet Anomaly  Not Reportable   09/17/22  07:58    


 


Olive Rods  Not Reportable   09/17/22  07:58    


 


Platelet Estimate  Appears normal   09/17/22  07:58    


 


Clumped Platelets  1+   09/17/22  07:58    


 


Plt Clumps, EDTA  Not Reportable   09/17/22  07:58    


 


Large Platelets  Not Reportable   09/17/22  07:58    


 


Giant Platelets  Not Reportable   09/17/22  07:58    


 


Platelet Satelliting  Not Reportable   09/17/22  07:58    


 


Plt Morphology Comment  Not Reportable   09/17/22  07:58    


 


RBC Morphology  Not Reportable   09/17/22  07:58    


 


Dimorphic RBCs  Not Reportable   09/17/22  07:58    


 


Polychromasia  Few   09/17/22  07:58    


 


Hypochromasia  Few   09/17/22  07:58    


 


Poikilocytosis  Not Reportable   09/17/22  07:58    


 


Anisocytosis  Not Reportable   09/17/22  07:58    


 


Microcytosis  Not Reportable   09/17/22  07:58    


 


Macrocytosis  Not Reportable   09/17/22  07:58    


 


Spherocytes  Not Reportable   09/17/22  07:58    


 


Pappenheimer Bodies  Not Reportable   09/17/22  07:58    


 


Sickle Cells  Not Reportable   09/17/22  07:58    


 


Target Cells  Few   09/17/22  07:58    


 


Tear Drop Cells  Not Reportable   09/17/22  07:58    


 


Ovalocytes  Not Reportable   09/17/22  07:58    


 


Helmet Cells  Not Reportable   09/17/22  07:58    


 


Soler-Griffithville Bodies  Not Reportable   09/17/22  07:58    


 


Cabot Rings  Not Reportable   09/17/22  07:58    


 


Jamison Cells  Not Reportable   09/17/22  07:58    


 


Bite Cells  Not Reportable   09/17/22  07:58    


 


Crenated Cell  Not Reportable   09/17/22  07:58    


 


Elliptocytes  Not Reportable   09/17/22  07:58    


 


Acanthocytes (Spur)  Not Reportable   09/17/22  07:58    


 


Rouleaux  Not Reportable   09/17/22  07:58    


 


Hemoglobin C Crystals  Not Reportable   09/17/22  07:58    


 


Schistocytes  Not Reportable   09/17/22  07:58    


 


Malaria parasites  Not Reportable   09/17/22  07:58    


 


Tej Bodies  Not Reportable   09/17/22  07:58    


 


Hem Pathologist Commnt  No   09/17/22  07:58    


 


PT  14.5 Sec. (12.2-14.9)   09/12/22  Unknown


 


INR  0.99  (0.87-1.13)   09/12/22  Unknown


 


Sodium  136 mmol/L (137-145)  L  09/17/22  07:58    


 


Potassium  2.9 mmol/L (3.6-5.0)  L* D 09/17/22  07:58    


 


Chloride  98.8 mmol/L ()   09/17/22  07:58    


 


Carbon Dioxide  24 mmol/L (22-30)   09/17/22  07:58    


 


Anion Gap  16 mmol/L  09/17/22  07:58    


 


BUN  15 mg/dL (9-20)   09/17/22  07:58    


 


Creatinine  0.9 mg/dL (0.8-1.3)   09/17/22  07:58    


 


Estimated GFR  > 60 ml/min  09/17/22  07:58    


 


BUN/Creatinine Ratio  17 %  09/17/22  07:58    


 


Glucose  217 mg/dL ()  H  09/17/22  07:58    


 


POC Glucose  159 mg/dL ()  H  09/17/22  20:32    


 


Hemoglobin A1c  6.5 % (4-6)  H  09/10/22  08:41    


 


Lactic Acid  1.50 mmol/L (0.7-2.0)   09/10/22  02:26    


 


Calcium  7.5 mg/dL (8.4-10.2)  L  09/17/22  07:58    


 


Magnesium  1.80 mg/dL (1.7-2.3)   09/17/22  10:44    


 


Iron  16 ug/dL ()  L  09/11/22  05:35    


 


TIBC  133 mcg/dL (250-450)  L  09/11/22  05:35    


 


Ferritin  2888.0 ng/mL (30.0-300.0)  H  09/11/22  05:35    


 


Total Bilirubin  0.70 mg/dL (0.1-1.2)   09/10/22  02:26    


 


AST  41 units/L (5-40)  H  09/10/22  02:26    


 


ALT  28 units/L (7-56)   09/10/22  02:26    


 


Alkaline Phosphatase  129 units/L ()   09/10/22  02:26    


 


Total Creatine Kinase  95 units/L ()   09/17/22  07:58    


 


Total Protein  6.9 g/dL (6.3-8.2)   09/10/22  02:26    


 


Albumin  3.6 g/dL (3.9-5)  L  09/10/22  02:26    


 


Albumin/Globulin Ratio  1.1 %  09/10/22  02:26    


 


Triglycerides  114 mg/dL (2-149)   09/10/22  08:41    


 


Cholesterol  116 mg/dL ()   09/10/22  08:41    


 


LDL Cholesterol Direct  46 mg/dL ()  L  09/10/22  08:41    


 


HDL Cholesterol  37 mg/dL (40-59)  L  09/10/22  08:41    


 


Cholesterol/HDL Ratio  3.13 %  09/10/22  08:41    


 


Vitamin B12  > 2000 pg/mL (211-911)  H  09/17/22  07:58    


 


Blood Type  O POSITIVE   09/11/22  Unknown











Microbiology: 


Microbiology





09/13/22 15:33   Toe - Left Third   Anaerobic Culture - Final


09/13/22 15:32   Toe - Left Third   Surgical Culture - Final








Toney/IV: 


                                        





Voiding Method                   Condom Catheter











Active Medications





- Current Medications


Current Medications: 














Generic Name Dose Route Start Last Admin





  Trade Name Freq  PRN Reason Stop Dose Admin


 


Acetaminophen  650 mg  09/10/22 08:30  09/17/22 20:15





  Acetaminophen 325 Mg Tab  PO   650 mg





  Q4H PRN   Administration





  Pain MILD(1-3)/Fever >100.5/HA  


 


Atorvastatin Calcium  40 mg  09/13/22 11:00  09/17/22 10:23





  Atorvastatin 40 Mg Tab  PO   40 mg





  DAILY ELVIE   Administration


 


Cilostazol  100 mg  09/12/22 12:00  09/17/22 23:18





  Cilostazol 100 Mg Tab  PO   100 mg





  BID ELVIE   Administration


 


Clopidogrel Bisulfate  75 mg  09/13/22 10:00  09/17/22 10:23





  Clopidogrel 75 Mg Tab  PO   75 mg





  QDAY ELVIE   Administration


 


Dextrose  50 ml  09/10/22 08:35 





  Dextrose 50% In Water (25gm) 50 Ml Syringe  IV  





  Q30MIN PRN  





  Hypoglycemia  





  Protocol  


 


Sodium Chloride  1,000 mls @ 100 mls/hr  09/10/22 05:30  09/17/22 20:19





  Nacl 0.9% 1000 Ml  IV   100 mls/hr





  AS DIRECT ELVIE   Administration


 


Daptomycin 500 mg/ Sodium  100 mls @ 200 mls/hr  09/13/22 18:00  09/17/22 17:57





  Chloride  IV   200 mls/hr





  Q24H ELVIE   Administration





  Protocol  


 


Metronidazole  500 mg in 100 mls @ 100 mls/hr  09/15/22 12:00  09/18/22 04:24





  Flagyl 500 Mg/100 Ml  IV   100 mls/hr





  Q8H ELVIE   Administration





  Protocol  


 


Cefepime HCl  1 gm in 100 mls @ 200 mls/hr  09/15/22 14:00  09/18/22 06:05





  Cefepime/Ns 1 Gm/100 Ml  IV   200 mls/hr





  Q8H ELVIE   Administration





  Protocol  


 


Insulin Human Isoph/Insulin Regular  25 unit  09/10/22 19:16  09/17/22 17:57





  Insulin Nph/Regular 70/30 Inj  SUB-Q   25 unit





  BIDDIAB ELVIE   Administration


 


Insulin Human Regular  5 units  09/10/22 22:00  09/17/22 22:00





  Insulin Regular, Human 100 Units/1 Ml  SUB-Q   Not Given





  [facility]  


 


Insulin Human Regular  0 units  09/17/22 07:30  09/17/22 22:00





  Insulin Regular, Human 100 Units/1 Ml  SUB-Q   Not Given





  [facility]  





  Protocol  


 


Metoclopramide HCl  10 mg  09/12/22 17:34  09/12/22 22:50





  Metoclopramide 10 Mg/2 Ml Inj  IV   10 mg





  Q6H PRN   Administration





  Nausea And Vomiting  


 


Metoprolol Tartrate  100 mg  09/13/22 11:00  09/17/22 23:17





  Metoprolol Tartrate 100 Mg Tab  PO   100 mg





  BID ELVIE   Administration


 


Morphine Sulfate  2 mg  09/10/22 08:30  09/17/22 20:35





  Morphine 4 Mg/1 Ml Inj  IV   2 mg





  Q4H PRN   Administration





  Pain , Severe (7-10)  


 


Nifedipine  30 mg  09/11/22 10:00  09/17/22 10:23





  Nifedipine Xl 30 Mg Tab  PO   30 mg





  QDAY ELVIE   Administration


 


Ondansetron HCl  4 mg  09/10/22 08:30  09/17/22 20:16





  Ondansetron 4 Mg/2 Ml Inj  IV   4 mg





  Q8H PRN   Administration





  Nausea And Vomiting  


 


Oxycodone/Acetaminophen  1 tab  09/10/22 08:30  09/17/22 23:19





  Oxycodone /Acetaminophen 5-325mg Tab  PO   1 tab





  Q6H PRN   Administration





  Pain, Moderate (4-6)  


 


Sodium Chloride  10 ml  09/10/22 10:00  09/17/22 23:18





  Sodium Chloride 0.9% 10 Ml Flush Syringe  IV   10 ml





  BID ELVIE   Administration


 


Sodium Chloride  10 ml  09/10/22 08:30 





  Sodium Chloride 0.9% 10 Ml Flush Syringe  IV  





  PRN PRN  





  LINE FLUSH  


 


Sodium Hypochlorite  1 applic  09/17/22 10:00  09/17/22 23:39





  Sodium Hypochlorite, Dakin's 1/2 Strength (0.25%) 473 Ml Topical Soln  TP   1 

dose





  BID ELVIE   Administration














Nutrition/Malnutrition Assess





- Dietary Evaluation


Nutrition/Malnutrition Findings: 


                                        





Nutrition Notes                                            Start:  09/11/22 

11:33


Freq:                                                      Status: Active       




Protocol:                                                                       




 Document     09/16/22 13:47  CM  (Rec: 09/16/22 13:56  CM  [id number])


 Co-Sign      09/16/22 13:47  WW


 Nutrition Notes


     Initial or Follow up                        Brief Note


     Current Diagnosis                           Diabetes,Hypertension


     Other Pertinent Diagnosis                   PVD s/p RLE bypass,


                                                 thrombocytopenia


     Current Diet                                Cardiac Diet + Glucerna BID


     Labs/Tests                                  9/16:


                                                 Glu 158


     Pertinent Medications                       9/16:


                                                 Atorvastatin


                                                 Humulin 70/30


                                                 Humulin R


     Height                                      5 ft 8 in


     Weight                                      70.1 kg


     Usual Body Weight                           75 kg


     Ideal Body Weight (kg)                      70.00


     BMI                                         23.5


     Weight change and time frame                2% wt loss in 1 day - will


                                                 monitor.


     Weight Status                               Appropriate


     Subjective/Other Information                RD follow-up per protocol.


                                                 Pt advanced to cardiac diet


                                                 and Glucerna BID ordered.


                                                 100% of dinner consumed 9/15.


                                                 New wt added today.


     Percent of energy/protein needs met:        Cardiac Diet provides 2230kcal


                                                 / 85g PRO q day - 113% Kcal /


                                                 99% AA before nutrition


                                                 supplements.


     GI Symptoms                                 None


     #1


      Nutrition Diagnosis                        Malnutrition


      Diagnosis Progress(for reassessment        Continues


       documentation)                            


     Is patient on ventilator?                   No


     Is Patient Ambulatory and/or Out of Bed     No


     [facility]      [phone]


     Kcal/Kg value to use for calculation        28


     Approximate Energy Requirements Using       1963


      kcal/Kg                                    


     Calculation Used for Recommendations        Kcal/kg


     Additional Notes                            1.2-1.5g/kg ABW; 86-108g PRO q


                                                 day


                                                 Fluid needs: 30mL/kg or per MD


                                                 .


 Nutrition Intervention


     Change Diet Order:                          Continue current diet order or


                                                 per SLP recommendations.


     Add Supplement/Snack (indicate name/kcal    Glucerna BID (Chocolate)


      /protein )                                 


     Provides kCal:                              440


     Provides Protein (gm)                       20


     Goal #1                                     Pt to consume >75% of


                                                 estimated energy/protein needs


                                                 through current diet order


                                                 and nutrition supplements


     Goal #2                                     Pt to maintain current wt


                                                 status within 2.5% throughout


                                                 LOS.


     Follow-Up By:                               09/23/22


     Additional Comments                         Monitor % PO intake, diet


                                                 tolerance, and wt status.

## 2022-09-19 LAB
BASOPHILS # (AUTO): 0.2 K/MM3 (ref 0–0.1)
BASOPHILS NFR BLD AUTO: 0.4 % (ref 0–1.8)
BUN SERPL-MCNC: 19 MG/DL (ref 9–20)
BUN/CREAT SERPL: 15 %
CALCIUM SERPL-MCNC: 7.3 MG/DL (ref 8.4–10.2)
EOSINOPHIL # BLD AUTO: 0 K/MM3 (ref 0–0.4)
EOSINOPHIL NFR BLD AUTO: 0.1 % (ref 0–4.3)
HCT VFR BLD CALC: 25.1 % (ref 35.5–45.6)
HEMOLYSIS INDEX: 5
HGB BLD-MCNC: 7.8 GM/DL (ref 11.8–15.2)
LYMPHOCYTES # BLD AUTO: 1.1 K/MM3 (ref 1.2–5.4)
MCHC RBC AUTO-ENTMCNC: 31 % (ref 32–34)
MCV RBC AUTO: 97 FL (ref 84–94)
MONOCYTES # (AUTO): 1.8 K/MM3 (ref 0–0.8)
MONOCYTES % (AUTO): 5.9 % (ref 0–7.3)
PLATELET # BLD: 14 K/MM3 (ref 140–440)
RBC # BLD AUTO: 2.58 M/MM3 (ref 3.65–5.03)

## 2022-09-19 RX ADMIN — METOPROLOL TARTRATE SCH MG: 100 TABLET, FILM COATED ORAL at 09:36

## 2022-09-19 RX ADMIN — INSULIN HUMAN SCH UNITS: 100 INJECTION, SOLUTION PARENTERAL at 09:34

## 2022-09-19 RX ADMIN — CEFEPIME SCH MLS/HR: 1 INJECTION, POWDER, FOR SOLUTION INTRAMUSCULAR; INTRAVENOUS at 13:57

## 2022-09-19 RX ADMIN — INSULIN HUMAN SCH: 100 INJECTION, SUSPENSION SUBCUTANEOUS at 16:27

## 2022-09-19 RX ADMIN — INSULIN HUMAN SCH: 100 INJECTION, SUSPENSION SUBCUTANEOUS at 16:26

## 2022-09-19 RX ADMIN — SODIUM HYPOCHLORITE SCH DOSE: 2.5 SOLUTION TOPICAL at 02:04

## 2022-09-19 RX ADMIN — INSULIN HUMAN SCH: 100 INJECTION, SUSPENSION SUBCUTANEOUS at 13:56

## 2022-09-19 RX ADMIN — SODIUM CHLORIDE SCH MLS/HR: 0.9 INJECTION, SOLUTION INTRAVENOUS at 13:57

## 2022-09-19 RX ADMIN — CEFEPIME SCH MLS/HR: 1 INJECTION, POWDER, FOR SOLUTION INTRAMUSCULAR; INTRAVENOUS at 05:15

## 2022-09-19 RX ADMIN — NIFEDIPINE SCH MG: 30 TABLET, FILM COATED, EXTENDED RELEASE ORAL at 09:35

## 2022-09-19 RX ADMIN — METRONIDAZOLE SCH MLS/HR: 5 INJECTION, SOLUTION INTRAVENOUS at 03:40

## 2022-09-19 RX ADMIN — INSULIN HUMAN SCH UNITS: 100 INJECTION, SOLUTION PARENTERAL at 16:26

## 2022-09-19 RX ADMIN — CEFEPIME SCH MLS/HR: 1 INJECTION, POWDER, FOR SOLUTION INTRAMUSCULAR; INTRAVENOUS at 22:34

## 2022-09-19 RX ADMIN — METOPROLOL TARTRATE SCH MG: 100 TABLET, FILM COATED ORAL at 21:15

## 2022-09-19 RX ADMIN — OXYCODONE AND ACETAMINOPHEN PRN TAB: 5; 325 TABLET ORAL at 16:24

## 2022-09-19 RX ADMIN — METRONIDAZOLE SCH MLS/HR: 5 INJECTION, SOLUTION INTRAVENOUS at 21:15

## 2022-09-19 RX ADMIN — CILOSTAZOL SCH MG: 100 TABLET ORAL at 09:35

## 2022-09-19 RX ADMIN — OXYCODONE AND ACETAMINOPHEN PRN TAB: 5; 325 TABLET ORAL at 09:35

## 2022-09-19 RX ADMIN — METRONIDAZOLE SCH MLS/HR: 5 INJECTION, SOLUTION INTRAVENOUS at 13:57

## 2022-09-19 RX ADMIN — SODIUM HYPOCHLORITE SCH DOSE: 2.5 SOLUTION TOPICAL at 22:34

## 2022-09-19 RX ADMIN — INSULIN HUMAN SCH UNITS: 100 INJECTION, SOLUTION PARENTERAL at 13:54

## 2022-09-19 RX ADMIN — INSULIN HUMAN SCH UNITS: 100 INJECTION, SOLUTION PARENTERAL at 22:33

## 2022-09-19 RX ADMIN — CLOPIDOGREL BISULFATE SCH MG: 75 TABLET ORAL at 09:35

## 2022-09-19 RX ADMIN — CILOSTAZOL SCH MG: 100 TABLET ORAL at 21:15

## 2022-09-19 RX ADMIN — Medication SCH ML: at 21:19

## 2022-09-19 NOTE — PROGRESS NOTE
Assessment and Plan


Assessment and plan: 





Acute hypoxic respiratory failure 


Patient had desaturation on 9/18 in the mid 80s


Chest x-ray revealed mild interstitial pulmonary edema


Will check echocardiogram





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


Diabetic foot infection with abscess of left third digit


Osteomyelitis of left foot


Peripheral arterial disease of bilateral lower extremities


WBC elevated. S/p revascularization on 9/12/2022, followed by toe amputation 

9/13/2022. Noted to have necrotic tissue.  MRI with abscess between the third 

and fourth toe also extensive bony destruction of the third fourth fifth and 

even the second toe. S/p TMA of second through fifth toes on 9/15/2022


Continue IV cefepime + Daptomycin + Flagyl. Avoiding linezolid due to 

thrombocytopenia. 


-ID to follow-up surgical culture to help decide final abx plan, patient may 

receive IV antibiotics at the group home


-Patient with fever of 101.5.





Diarrhea 


Check C. difficile





Hypokalemia 


Etiology likely secondary to diarrhea, check magnesium


Replete potassium





insulin dependent type II diabetes mellitus with hyperglycemiaimproving


Worsened by diabetic foot infection


- hemoglobin A1c: 6.5


- home regimen: Unknown


- current regimen: NPH 25 units twice daily + moderate SSI +8 units regular 

insulin with meals


- blood glucose goal 140-180 while inpatient


- continue to monitor





Pseudohyponatremiaresolved





JASMEET on likely CKD stage IIIresolved


Status post aggressive IV fluid resuscitation


Renally dose meds and avoid nephrotoxic drugs.


Monitor BMP





Hypertension


- home medications: Unknown


- current medications: Started nifedipine 30 mg daily


- SBP goal <160 and DBP goal <90 while inpatient


- continue to monitor





Iron deficiency anemia


anemia of chronic disease


Hemoglobin 6.7


 We will transfuse 1 unit PRBCs





Acute thrombocytopenia


status post transfusion of 1 platelet on 9/11/2022


Patient endorses being aware but not having etiology determined by other 

clinicians.


-Consult hematology





Moderate protein caloric malnutrition


Albumin 3.6


Will initiate dietary supplementation once hyperglycemia is under control.





Disposition


Case management reports placement at a group home.  Also, patient may receive IV

antibiotics at the group home per case management.





History


Interval history: 





No new issues overnight





Hospitalist Physical





- Constitutional


Vitals: 


                                        











Temp Pulse Resp BP Pulse Ox


 


 99.7 F H  115 H  18   153/57   91 


 


 09/19/22 07:36  09/19/22 07:36  09/19/22 07:36  09/19/22 07:36  09/19/22 07:36











General appearance: Present: no acute distress, well-nourished, malodorous 

(Malodorous wet gangrene of left foot with ulceration of third digit)





- EENT


Eyes: Present: PERRL, EOM intact


ENT: hearing intact, clear oral mucosa, dentition normal





- Neck


Neck: Present: supple, normal ROM





- Respiratory


Respiratory effort: normal


Respiratory: bilateral: CTA





- Cardiovascular


Rhythm: regular


Heart Sounds: Present: S1 & S2.  Absent: gallop, rub





- Extremities


Extremities: no ischemia, No edema, Full ROM





- Abdominal


General gastrointestinal: soft, non-tender, non-distended, normal bowel sounds





- Integumentary


Integumentary: Present: clear, warm, dry





- Neurologic


Neurologic: CNII-XII intact, moves all extremities





Results





- Labs


CBC & Chem 7: 


                                 09/17/22 07:58





                                 09/18/22 08:22


Labs: 


                             Laboratory Last Values











WBC  29.4 K/mm3 (4.5-11.0)  H  09/17/22  07:58    


 


RBC  2.11 M/mm3 (3.65-5.03)  L  09/17/22  07:58    


 


Hgb  6.7 gm/dl (11.8-15.2)  L  09/17/22  07:58    


 


Hct  21.0 % (35.5-45.6)  L  09/17/22  07:58    


 


MCV  100 fl (84-94)  H  09/17/22  07:58    


 


MCH  32 pg (28-32)   09/17/22  07:58    


 


MCHC  32 % (32-34)   09/17/22  07:58    


 


RDW  13.5 % (13.2-15.2)   09/17/22  07:58    


 


Plt Count  34 K/mm3 (140-440)  L  09/17/22  07:58    


 


Lymph % (Auto)  16.2 % (13.4-35.0)   09/12/22  Unknown


 


Mono % (Auto)  11.9 % (0.0-7.3)  H  09/12/22  Unknown


 


Eos % (Auto)  0.7 % (0.0-4.3)   09/12/22  Unknown


 


Baso % (Auto)  0.5 % (0.0-1.8)   09/12/22  Unknown


 


Lymph # (Auto)  2.9 K/mm3 (1.2-5.4)   09/12/22  Unknown


 


Mono # (Auto)  2.1 K/mm3 (0.0-0.8)  H  09/12/22  Unknown


 


Eos # (Auto)  0.1 K/mm3 (0.0-0.4)   09/12/22  Unknown


 


Baso # (Auto)  0.1 K/mm3 (0.0-0.1)   09/12/22  Unknown


 


Add Manual Diff  Complete   09/17/22  07:58    


 


Total Counted  100   09/17/22  07:58    


 


Seg Neutrophils %  70.7 % (40.0-70.0)  H  09/12/22  Unknown


 


Seg Neuts % (Manual)  86.0 % (40.0-70.0)  H  09/17/22  07:58    


 


Band Neutrophils %  0 %  09/17/22  07:58    


 


Lymphocytes % (Manual)  4.0 % (13.4-35.0)  L  09/17/22  07:58    


 


Reactive Lymphs % (Man)  0 %  09/17/22  07:58    


 


Monocytes % (Manual)  10.0 % (0.0-7.3)  H  09/17/22  07:58    


 


Eosinophils % (Manual)  0 % (0.0-4.3)   09/17/22  07:58    


 


Basophils % (Manual)  0 % (0.0-1.8)   09/17/22  07:58    


 


Metamyelocytes %  0 %  09/17/22  07:58    


 


Myelocytes %  0 %  09/17/22  07:58    


 


Promyelocytes %  0 %  09/17/22  07:58    


 


Blast Cells %  0 %  09/17/22  07:58    


 


Nucleated RBC %  Not Reportable   09/17/22  07:58    


 


Seg Neutrophils #  12.6 K/mm3 (1.8-7.7)  H  09/12/22  Unknown


 


Seg Neutrophils # Man  25.3 K/mm3 (1.8-7.7)  H  09/17/22  07:58    


 


Band Neutrophils #  0.0 K/mm3  09/17/22  07:58    


 


Lymphocytes # (Manual)  1.2 K/mm3 (1.2-5.4)   09/17/22  07:58    


 


Abs React Lymphs (Man)  0.0 K/mm3  09/17/22  07:58    


 


Monocytes # (Manual)  2.9 K/mm3 (0.0-0.8)  H  09/17/22  07:58    


 


Eosinophils # (Manual)  0.0 K/mm3 (0.0-0.4)   09/17/22  07:58    


 


Basophils # (Manual)  0.0 K/mm3 (0.0-0.1)   09/17/22  07:58    


 


Metamyelocytes #  0.0 K/mm3  09/17/22  07:58    


 


Myelocytes #  0.0 K/mm3  09/17/22  07:58    


 


Promyelocytes #  0.0 K/mm3  09/17/22  07:58    


 


Blast Cells #  0.0 K/mm3  09/17/22  07:58    


 


WBC Morphology  Not Reportable   09/17/22  07:58    


 


WBC Morphology  TNR   09/17/22  07:58    


 


Hypersegmented Neuts  Not Reportable   09/17/22  07:58    


 


Hyposegmented Neuts  Not Reportable   09/17/22  07:58    


 


Hypogranular Neuts  Not Reportable   09/17/22  07:58    


 


Smudge Cells  Not Reportable   09/17/22  07:58    


 


Toxic Granulation  Not Reportable   09/17/22  07:58    


 


Toxic Vacuolation  Not Reportable   09/17/22  07:58    


 


Dohle Bodies  Not Reportable   09/17/22  07:58    


 


Pelger-Huet Anomaly  Not Reportable   09/17/22  07:58    


 


Olive Rods  Not Reportable   09/17/22  07:58    


 


Platelet Estimate  Appears normal   09/17/22  07:58    


 


Clumped Platelets  1+   09/17/22  07:58    


 


Plt Clumps, EDTA  Not Reportable   09/17/22  07:58    


 


Large Platelets  Not Reportable   09/17/22  07:58    


 


Giant Platelets  Not Reportable   09/17/22  07:58    


 


Platelet Satelliting  Not Reportable   09/17/22  07:58    


 


Plt Morphology Comment  Not Reportable   09/17/22  07:58    


 


RBC Morphology  Not Reportable   09/17/22  07:58    


 


Dimorphic RBCs  Not Reportable   09/17/22  07:58    


 


Polychromasia  Few   09/17/22  07:58    


 


Hypochromasia  Few   09/17/22  07:58    


 


Poikilocytosis  Not Reportable   09/17/22  07:58    


 


Anisocytosis  Not Reportable   09/17/22  07:58    


 


Microcytosis  Not Reportable   09/17/22  07:58    


 


Macrocytosis  Not Reportable   09/17/22  07:58    


 


Spherocytes  Not Reportable   09/17/22  07:58    


 


Pappenheimer Bodies  Not Reportable   09/17/22  07:58    


 


Sickle Cells  Not Reportable   09/17/22  07:58    


 


Target Cells  Few   09/17/22  07:58    


 


Tear Drop Cells  Not Reportable   09/17/22  07:58    


 


Ovalocytes  Not Reportable   09/17/22  07:58    


 


Helmet Cells  Not Reportable   09/17/22  07:58    


 


[place] Bodies  Not Reportable   09/17/22  07:58    


 


Cabot Rings  Not Reportable   09/17/22  07:58    


 


[place] Cells  Not Reportable   09/17/22  07:58    


 


Bite Cells  Not Reportable   09/17/22  07:58    


 


Crenated Cell  Not Reportable   09/17/22  07:58    


 


Elliptocytes  Not Reportable   09/17/22  07:58    


 


Acanthocytes (Spur)  Not Reportable   09/17/22  07:58    


 


Rouleaux  Not Reportable   09/17/22  07:58    


 


Hemoglobin C Crystals  Not Reportable   09/17/22  07:58    


 


Schistocytes  Not Reportable   09/17/22  07:58    


 


Malaria parasites  Not Reportable   09/17/22  07:58    


 


Tej Bodies  Not Reportable   09/17/22  07:58    


 


Hem Pathologist Commnt  No   09/17/22  07:58    


 


PT  14.5 Sec. (12.2-14.9)   09/12/22  Unknown


 


INR  0.99  (0.87-1.13)   09/12/22  Unknown


 


ABG pH  7.528 pH Units (7.350-7.450)  H  09/18/22  11:56    


 


ABG pCO2  30.0 mm Hg  09/18/22  11:56    


 


ABG pO2  125.0 mm Hg (80.0-90.0)  H  09/18/22  11:56    


 


ABG HCO3  24.4 mmol/L (20.0-26.0)   09/18/22  11:56    


 


ABG O2 Saturation  98.7 % (95.0-99.0)   09/18/22  11:56    


 


ABG O2 Content  9.7  (0.0-44)   09/18/22  11:56    


 


ABG Base Excess  1.6 mmol/L (-2.0-3.0)   09/18/22  11:56    


 


ABG Hemoglobin  6.9 gm/dl (14.0-18.0)  L  09/18/22  11:56    


 


ABG Carboxyhemoglobin  1.6 % (0.0-5.0)   09/18/22  11:56    


 


ABG Methemoglobin  0.4 % (0.0-1.5)   09/18/22  11:56    


 


Oxyhemoglobin  96.6 % (95.0-99.0)   09/18/22  11:56    


 


FiO2  60 %  09/18/22  11:56    


 


Sodium  141 mmol/L (137-145)   09/18/22  08:22    


 


Potassium  3.5 mmol/L (3.6-5.0)  L D 09/18/22  08:22    


 


Chloride  103.1 mmol/L ()   09/18/22  08:22    


 


Carbon Dioxide  24 mmol/L (22-30)   09/18/22  08:22    


 


Anion Gap  17 mmol/L  09/18/22  08:22    


 


BUN  12 mg/dL (9-20)   09/18/22  08:22    


 


Creatinine  1.0 mg/dL (0.8-1.3)   09/18/22  08:22    


 


Estimated GFR  > 60 ml/min  09/18/22  08:22    


 


BUN/Creatinine Ratio  12 %  09/18/22  08:22    


 


Glucose  30 mg/dL ()  L*  09/18/22  08:22    


 


POC Glucose  188 mg/dL ()  H  09/18/22  20:44    


 


Hemoglobin A1c  6.5 % (4-6)  H  09/10/22  08:41    


 


Lactic Acid  1.50 mmol/L (0.7-2.0)   09/10/22  02:26    


 


Calcium  7.2 mg/dL (8.4-10.2)  L  09/18/22  08:22    


 


Magnesium  1.80 mg/dL (1.7-2.3)   09/17/22  10:44    


 


Iron  16 ug/dL ()  L  09/11/22  05:35    


 


TIBC  133 mcg/dL (250-450)  L  09/11/22  05:35    


 


Ferritin  2888.0 ng/mL (30.0-300.0)  H  09/11/22  05:35    


 


Total Bilirubin  0.70 mg/dL (0.1-1.2)   09/10/22  02:26    


 


AST  41 units/L (5-40)  H  09/10/22  02:26    


 


ALT  28 units/L (7-56)   09/10/22  02:26    


 


Alkaline Phosphatase  129 units/L ()   09/10/22  02:26    


 


Total Creatine Kinase  95 units/L ()   09/17/22  07:58    


 


Total Protein  6.9 g/dL (6.3-8.2)   09/10/22  02:26    


 


Albumin  3.6 g/dL (3.9-5)  L  09/10/22  02:26    


 


Albumin/Globulin Ratio  1.1 %  09/10/22  02:26    


 


Triglycerides  114 mg/dL (2-149)   09/10/22  08:41    


 


Cholesterol  116 mg/dL ()   09/10/22  08:41    


 


LDL Cholesterol Direct  46 mg/dL ()  L  09/10/22  08:41    


 


HDL Cholesterol  37 mg/dL (40-59)  L  09/10/22  08:41    


 


Cholesterol/HDL Ratio  3.13 %  09/10/22  08:41    


 


Vitamin B12  > 2000 pg/mL (211-911)  H  09/17/22  07:58    


 


Blood Type  O POSITIVE   09/18/22  16:19    


 


Antibody Screen  Negative   09/18/22  16:19    


 


Crossmatch  See Detail   09/18/22  16:19    











Microbiology: 


Microbiology





09/18/22 11:34   Peripheral/Venous   Blood Culture - Preliminary


                            Culture in Progress


09/18/22 11:34   Peripheral/Venous   Blood Culture - Preliminary


                            Culture in Progress








Toney/IV: 


                                        





Voiding Method                   Condom Catheter











Active Medications





- Current Medications


Current Medications: 














Generic Name Dose Route Start Last Admin





  Trade Name Freq  PRN Reason Stop Dose Admin


 


Acetaminophen  650 mg  09/10/22 08:30  09/18/22 20:37





  Acetaminophen 325 Mg Tab  PO   650 mg





  Q4H PRN   Administration





  Pain MILD(1-3)/Fever >100.5/HA  


 


Atorvastatin Calcium  40 mg  09/13/22 11:00  09/18/22 09:50





  Atorvastatin 40 Mg Tab  PO   40 mg





  DAILY ELVIE   Administration


 


Cilostazol  100 mg  09/12/22 12:00  09/18/22 21:59





  Cilostazol 100 Mg Tab  PO   100 mg





  BID ELVIE   Administration


 


Clopidogrel Bisulfate  75 mg  09/13/22 10:00  09/18/22 09:50





  Clopidogrel 75 Mg Tab  PO   75 mg





  QDAY ELVIE   Administration


 


Dextrose  50 ml  09/10/22 08:35  09/18/22 16:13





  Dextrose 50% In Water (25gm) 50 Ml Syringe  IV   25 ml





  Q30MIN PRN   Administration





  Hypoglycemia  





  Protocol  


 


Sodium Chloride  1,000 mls @ 100 mls/hr  09/10/22 05:30  09/18/22 12:47





  Nacl 0.9% 1000 Ml  IV   100 mls/hr





  AS DIRECT ELVIE   Administration


 


Daptomycin 500 mg/ Sodium  100 mls @ 200 mls/hr  09/13/22 18:00  09/18/22 17:28





  Chloride  IV   200 mls/hr





  Q24H ELVIE   Administration





  Protocol  


 


Metronidazole  500 mg in 100 mls @ 100 mls/hr  09/15/22 12:00  09/19/22 03:40





  Flagyl 500 Mg/100 Ml  IV   100 mls/hr





  Q8H ELVIE   Administration





  Protocol  


 


Cefepime HCl  1 gm in 100 mls @ 200 mls/hr  09/15/22 14:00  09/19/22 05:15





  Cefepime/Ns 1 Gm/100 Ml  IV   200 mls/hr





  Q8H ELVIE   Administration





  Protocol  


 


Insulin Human Isoph/Insulin Regular  25 unit  09/10/22 19:16  09/18/22 16:39





  Insulin Nph/Regular 70/30 Inj  SUB-Q   Not Given





  [facility]  


 


Insulin Human Regular  5 units  09/10/22 22:00  09/18/22 22:03





  Insulin Regular, Human 100 Units/1 Ml  SUB-Q   Not Given





  [facility]  


 


Insulin Human Regular  0 units  09/17/22 07:30  09/18/22 22:04





  Insulin Regular, Human 100 Units/1 Ml  SUB-Q   Not Given





  [facility]  





  Protocol  


 


Metoclopramide HCl  10 mg  09/12/22 17:34  09/18/22 20:54





  Metoclopramide 10 Mg/2 Ml Inj  IV   10 mg





  Q6H PRN   Administration





  Nausea And Vomiting  


 


Metoprolol Tartrate  100 mg  09/13/22 11:00  09/18/22 22:05





  Metoprolol Tartrate 100 Mg Tab  PO   100 mg





  BID ELVIE   Administration


 


Morphine Sulfate  2 mg  09/10/22 08:30  09/18/22 21:53





  Morphine 4 Mg/1 Ml Inj  IV   2 mg





  Q4H PRN   Administration





  Pain , Severe (7-10)  


 


Nifedipine  30 mg  09/11/22 10:00  09/18/22 09:50





  Nifedipine Xl 30 Mg Tab  PO   30 mg





  QDAY ELVIE   Administration


 


Ondansetron HCl  4 mg  09/10/22 08:30  09/18/22 19:18





  Ondansetron 4 Mg/2 Ml Inj  IV   4 mg





  Q8H PRN   Administration





  Nausea And Vomiting  


 


Oxycodone/Acetaminophen  1 tab  09/10/22 08:30  09/18/22 09:49





  Oxycodone /Acetaminophen 5-325mg Tab  PO   1 tab





  Q6H PRN   Administration





  Pain, Moderate (4-6)  


 


Sodium Chloride  10 ml  09/10/22 10:00  09/18/22 22:06





  Sodium Chloride 0.9% 10 Ml Flush Syringe  IV   Not Given





  BID ELVIE  


 


Sodium Chloride  10 ml  09/10/22 08:30 





  Sodium Chloride 0.9% 10 Ml Flush Syringe  IV  





  PRN PRN  





  LINE FLUSH  


 


Sodium Hypochlorite  1 applic  09/17/22 10:00  09/19/22 02:04





  Sodium Hypochlorite, Dakin's 1/2 Strength (0.25%) 473 Ml Topical Soln  TP   1 

dose





  BID ELVIE   Administration














Nutrition/Malnutrition Assess





- Dietary Evaluation


Nutrition/Malnutrition Findings: 


                                        





Nutrition Notes                                            Start:  09/11/22 

11:33


Freq:                                                      Status: Active       




Protocol:                                                                       




 Document     09/16/22 13:47  CM  (Rec: 09/16/22 13:56  CM  [id number])


 Co-Sign      09/16/22 13:47  WW


 Nutrition Notes


     Initial or Follow up                        Brief Note


     Current Diagnosis                           Diabetes,Hypertension


     Other Pertinent Diagnosis                   PVD s/p RLE bypass,


                                                 thrombocytopenia


     Current Diet                                Cardiac Diet + Glucerna BID


     Labs/Tests                                  9/16:


                                                 Glu 158


     Pertinent Medications                       9/16:


                                                 Atorvastatin


                                                 Humulin 70/30


                                                 Humulin R


     Height                                      5 ft 8 in


     Weight                                      70.1 kg


     Usual Body Weight                           75 kg


     Ideal Body Weight (kg)                      70.00


     BMI                                         23.5


     Weight change and time frame                2% wt loss in 1 day - will


                                                 monitor.


     Weight Status                               Appropriate


     Subjective/Other Information                RD follow-up per protocol.


                                                 Pt advanced to cardiac diet


                                                 and Glucerna BID ordered.


                                                 100% of dinner consumed 9/15.


                                                 New wt added today.


     Percent of energy/protein needs met:        Cardiac Diet provides 2230kcal


                                                 / 85g PRO q day - 113% Kcal /


                                                 99% AA before nutrition


                                                 supplements.


     GI Symptoms                                 None


     #1


      Nutrition Diagnosis                        Malnutrition


      Diagnosis Progress(for reassessment        Continues


       documentation)                            


     Is patient on ventilator?                   No


     Is Patient Ambulatory and/or Out of Bed     No


     REE-([facility]-confined to bed)      [phone]


     Kcal/Kg value to use for calculation        28


     Approximate Energy Requirements Using       1963


      kcal/Kg                                    


     Calculation Used for Recommendations        Kcal/kg


     Additional Notes                            1.2-1.5g/kg ABW; 86-108g PRO q


                                                 day


                                                 Fluid needs: 30mL/kg or per MD


                                                 .


 Nutrition Intervention


     Change Diet Order:                          Continue current diet order or


                                                 per SLP recommendations.


     Add Supplement/Snack (indicate name/kcal    Glucerna BID (Chocolate)


      /protein )                                 


     Provides kCal:                              440


     Provides Protein (gm)                       20


     Goal #1                                     Pt to consume >75% of


                                                 estimated energy/protein needs


                                                 through current diet order


                                                 and nutrition supplements


     Goal #2                                     Pt to maintain current wt


                                                 status within 2.5% throughout


                                                 LOS.


     Follow-Up By:                               09/23/22


     Additional Comments                         Monitor % PO intake, diet


                                                 tolerance, and wt status.

## 2022-09-19 NOTE — PROGRESS NOTE
Assessment and Plan





Postop day #4 status post transmetatarsal amputation of toes 2 through 5.  

Patient with elevated white count this morning 28,000 potassium also is a little

low at 2.9.  Intraoperative wound cultures remain pending.





Patient with minimal foot pain but complaining abdominal discomfort.  He has 

lost his appetite.  There may be some postprandial symptoms.  Ultrasound of the 

right upper quadrant is reviewed showing gallstones but no active signs of 

cholecystitis.





Continue bid soaks in dakins soluiton. If abdo sx persist will check hida scan 

for biliary colic and chronic cholecystitis. Replace Potassium.





Subjective


Date of service: 09/19/22


Narrative: 





Patient with minimal complaints from the amputation of his toes.  He is 

complaining primarily of abdominal pain at this time.  The pain appears to be 

after solids.  He is able to tolerate liquids.  He has also complaining of 

diarrhea he had 5 episodes yesterday.  2 specimens have been sent to the lab for

C. difficile toxin assay no reports have returned as yet.





In addition his last white count was elevated on 9/17/2022 at 29,000.  Repeat 

white count is pending today.





Objective


                               Vital Signs - 12hr











  09/18/22 09/18/22 09/19/22





  22:05 22:36 02:45


 


Temperature  99.5 F 99 F


 


Pulse Rate 126 H 126 H 97 H


 


Respiratory  20 20





Rate   


 


Blood Pressure 130/50 146/51 


 


Blood Pressure   95/56





[Left]   


 


O2 Sat by Pulse  99 94





Oximetry   














  09/19/22 09/19/22





  03:30 07:36


 


Temperature  99.7 F H


 


Pulse Rate  115 H


 


Respiratory  18





Rate  


 


Blood Pressure  153/57


 


Blood Pressure  





[Left]  


 


O2 Sat by Pulse 94 91





Oximetry  














- Labs





                                 09/17/22 07:58





                                 09/18/22 08:22


                                 Diabetes panel











  09/18/22 Range/Units





  08:22 


 


Sodium  141  (137-145)  mmol/L


 


Potassium  3.5 L D  (3.6-5.0)  mmol/L


 


Chloride  103.1  ()  mmol/L


 


Carbon Dioxide  24  (22-30)  mmol/L


 


BUN  12  (9-20)  mg/dL


 


Creatinine  1.0  (0.8-1.3)  mg/dL


 


Glucose  30 L*  ()  mg/dL


 


Calcium  7.2 L  (8.4-10.2)  mg/dL








                                  Calcium panel











  09/18/22 Range/Units





  08:22 


 


Calcium  7.2 L  (8.4-10.2)  mg/dL








                                 Pituitary panel











  09/18/22 Range/Units





  08:22 


 


Sodium  141  (137-145)  mmol/L


 


Potassium  3.5 L D  (3.6-5.0)  mmol/L


 


Chloride  103.1  ()  mmol/L


 


Carbon Dioxide  24  (22-30)  mmol/L


 


BUN  12  (9-20)  mg/dL


 


Creatinine  1.0  (0.8-1.3)  mg/dL


 


Glucose  30 L*  ()  mg/dL


 


Calcium  7.2 L  (8.4-10.2)  mg/dL








                                  Adrenal panel











  09/18/22 Range/Units





  08:22 


 


Sodium  141  (137-145)  mmol/L


 


Potassium  3.5 L D  (3.6-5.0)  mmol/L


 


Chloride  103.1  ()  mmol/L


 


Carbon Dioxide  24  (22-30)  mmol/L


 


BUN  12  (9-20)  mg/dL


 


Creatinine  1.0  (0.8-1.3)  mg/dL


 


Glucose  30 L*  ()  mg/dL


 


Calcium  7.2 L  (8.4-10.2)  mg/dL

## 2022-09-19 NOTE — PROGRESS NOTE
Assessment and Plan





Cultures:


9/10/2022 blood culture: No growth


9/13/2022 left third toe surgical culture: In process.  Gram stain appears mixed

with GPC in pairs, rare GNR.





A/P:


67-year-old male with diabetes, hypertension, peripheral vascular disease with 

previous history of RLE arterial bypass, right TMA, former smoker quit in 2006 

was admitted to the hospital with left third toe wound:





#Sepsis, secondary to diabetic foot infection, osteomyelitis of left foot third 

toe with abscess: Risk factors diabetes and peripheral vascular disease. WBC 

elevated. S/p revascularization on 9/12/2022, followed by toe amputation 

9/13/2022. Noted to have necrotic tissue.  MRI with abscess between the third 

and fourth toe also extensive bony destruction of the third fourth fifth and 

even the second toe. S/p TMA of second through fifth toes on 9/15/2022





#Thrombocytopenia: ?acute





#JASMEET: Improved.  Renally adjust antibiotics as needed.





#Peripheral vascular disease, history of RLE arterial bypass, prior right TMA. 

S/p revascularization on 9/12/2022.





#Diabetes mellitus type 2, uncontrolled





Recs:


-Continue IV cefepime + Daptomycin + Flagyl. Avoiding linezolid due to 

thrombocytopenia. 


-CK continues to downtrend, okay to continue IV daptomycin. Recheck on Monday


-Surgical cultures are negative, white count remains elevated. 


-Follow up C diff PCR - could explain leukocytosis. 





TERRANCE Rios MD


[facility] ([facility])


O: [phone]


F: [phone]





Subjective


Date of service: 09/19/22


Principal diagnosis: PVD with critical limb


Interval history: 





Afebrile over the past few days, white count remains elevated at 33 cultures 

remain negative.





Imaging personally reviewed:


Chest x-ray: Interstitial pulmonary edema





Objective





- Exam


Narrative Exam: 








Physical Exam: 


Constitutional: Alert, cooperative. No acute distress


Head, Ears, Nose: Normocephalic, atraumatic. 


Eyes: Conjunctivae/corneas clear. No icterus. No ptosis.


Neck: Supple, no meningeal signs


Cardiovascular: S1, S2 +


Respiratory: Good air entry, clear to auscultation bilaterally


GI: Soft, non-tender; bowel sounds normal. No peritoneal signs


Musculoskeletal: Right TMA well-healed, left foot dressing +


Skin: No rash or abscess


Hem/Lymphatic: No palpable cervical or supraclavicular nodes. No lymphangitis


Psych: Mood ok. Affect normal


Neurological: Awake, alert, oriented. No gross abnormality   





- Constitutional


Vitals: 


                                   Vital Signs











Temp Pulse Resp BP Pulse Ox


 


 98.8 F   102 H  20   142/51   87 


 


 09/19/22 11:37  09/19/22 11:37  09/19/22 11:37  09/19/22 11:37  09/19/22 15:20








                           Temperature -Last 24 Hours











Temperature                    98.8 F


 


Temperature                    99.7 F


 


Temperature                    99 F


 


Temperature                    99.5 F


 


Temperature                    99.8 F

















- Labs


CBC & Chem 7: 


                                 09/19/22 08:31





                                 09/19/22 08:31


Labs: 


                              Abnormal lab results











  09/18/22 09/18/22 09/19/22 Range/Units





  16:19 20:44 07:37 


 


WBC     (4.5-11.0)  K/mm3


 


RBC     (3.65-5.03)  M/mm3


 


Hgb     (11.8-15.2)  gm/dl


 


Hct     (35.5-45.6)  %


 


MCV     (84-94)  fl


 


MCHC     (32-34)  %


 


RDW     (13.2-15.2)  %


 


Mono # (Auto)     (0.0-0.8)  K/mm3


 


Baso # (Auto)     (0.0-0.1)  K/mm3


 


Seg Neutrophils %     (40.0-70.0)  %


 


Seg Neutrophils #     (1.8-7.7)  K/mm3


 


Sodium     (137-145)  mmol/L


 


Carbon Dioxide     (22-30)  mmol/L


 


Glucose     ()  mg/dL


 


POC Glucose   188 H  283 H  ()  mg/dL


 


Calcium     (8.4-10.2)  mg/dL


 


NT-Pro-B Natriuret Pep     (0-900)  pg/mL


 


Crossmatch  See Detail    














  09/19/22 09/19/22 09/19/22 Range/Units





  08:31 08:31 09:50 


 


WBC  33.1 H    (4.5-11.0)  K/mm3


 


RBC  2.58 L    (3.65-5.03)  M/mm3


 


Hgb  7.8 L    (11.8-15.2)  gm/dl


 


Hct  25.1 L    (35.5-45.6)  %


 


MCV  97 H    (84-94)  fl


 


MCHC  31 L    (32-34)  %


 


RDW  16.8 H    (13.2-15.2)  %


 


Mono # (Auto)  1.8 H    (0.0-0.8)  K/mm3


 


Baso # (Auto)  0.2 H    (0.0-0.1)  K/mm3


 


Seg Neutrophils %  89.3 H    (40.0-70.0)  %


 


Seg Neutrophils #  28.0 H    (1.8-7.7)  K/mm3


 


Sodium   136 L   (137-145)  mmol/L


 


Carbon Dioxide   19 L   (22-30)  mmol/L


 


Glucose   374 H   ()  mg/dL


 


POC Glucose     ()  mg/dL


 


Calcium   7.3 L   (8.4-10.2)  mg/dL


 


NT-Pro-B Natriuret Pep    3313 H  (0-900)  pg/mL


 


Crossmatch     














  09/19/22 09/19/22 Range/Units





  11:31 16:13 


 


WBC    (4.5-11.0)  K/mm3


 


RBC    (3.65-5.03)  M/mm3


 


Hgb    (11.8-15.2)  gm/dl


 


Hct    (35.5-45.6)  %


 


MCV    (84-94)  fl


 


MCHC    (32-34)  %


 


RDW    (13.2-15.2)  %


 


Mono # (Auto)    (0.0-0.8)  K/mm3


 


Baso # (Auto)    (0.0-0.1)  K/mm3


 


Seg Neutrophils %    (40.0-70.0)  %


 


Seg Neutrophils #    (1.8-7.7)  K/mm3


 


Sodium    (137-145)  mmol/L


 


Carbon Dioxide    (22-30)  mmol/L


 


Glucose    ()  mg/dL


 


POC Glucose  310 H  229 H  ()  mg/dL


 


Calcium    (8.4-10.2)  mg/dL


 


NT-Pro-B Natriuret Pep    (0-900)  pg/mL


 


Crossmatch

## 2022-09-20 LAB
ALBUMIN SERPL-MCNC: 2.3 G/DL (ref 3.9–5)
ALT SERPL-CCNC: 33 UNITS/L (ref 7–56)
BAND NEUTROPHILS # (MANUAL): 0.4 K/MM3
BUN SERPL-MCNC: 30 MG/DL (ref 9–20)
BUN/CREAT SERPL: 20 %
CALCIUM SERPL-MCNC: 7.4 MG/DL (ref 8.4–10.2)
HCT VFR BLD CALC: 22.4 % (ref 35.5–45.6)
HEMOLYSIS INDEX: 0
HGB BLD-MCNC: 7.1 GM/DL (ref 11.8–15.2)
MCHC RBC AUTO-ENTMCNC: 32 % (ref 32–34)
MCV RBC AUTO: 97 FL (ref 84–94)
MYELOCYTES # (MANUAL): 0 K/MM3
PLATELET # BLD: 30 K/MM3 (ref 140–440)
PROMYELOCYTES # (MANUAL): 0 K/MM3
RBC # BLD AUTO: 2.32 M/MM3 (ref 3.65–5.03)
TOTAL CELLS COUNTED BLD: 100

## 2022-09-20 PROCEDURE — 5A09357 ASSISTANCE WITH RESPIRATORY VENTILATION, LESS THAN 24 CONSECUTIVE HOURS, CONTINUOUS POSITIVE AIRWAY PRESSURE: ICD-10-PCS | Performed by: STUDENT IN AN ORGANIZED HEALTH CARE EDUCATION/TRAINING PROGRAM

## 2022-09-20 RX ADMIN — SODIUM HYPOCHLORITE SCH: 2.5 SOLUTION TOPICAL at 08:26

## 2022-09-20 RX ADMIN — METRONIDAZOLE SCH MLS/HR: 5 INJECTION, SOLUTION INTRAVENOUS at 20:12

## 2022-09-20 RX ADMIN — Medication SCH: at 08:26

## 2022-09-20 RX ADMIN — POTASSIUM CHLORIDE SCH MLS/HR: 10 INJECTION, SOLUTION INTRAVENOUS at 21:20

## 2022-09-20 RX ADMIN — INSULIN HUMAN SCH UNITS: 100 INJECTION, SOLUTION PARENTERAL at 21:48

## 2022-09-20 RX ADMIN — Medication SCH ML: at 11:06

## 2022-09-20 RX ADMIN — METRONIDAZOLE SCH MLS/HR: 5 INJECTION, SOLUTION INTRAVENOUS at 04:07

## 2022-09-20 RX ADMIN — POTASSIUM CHLORIDE SCH MLS/HR: 10 INJECTION, SOLUTION INTRAVENOUS at 17:58

## 2022-09-20 RX ADMIN — CILOSTAZOL SCH: 100 TABLET ORAL at 22:11

## 2022-09-20 RX ADMIN — INSULIN HUMAN SCH: 100 INJECTION, SOLUTION PARENTERAL at 08:30

## 2022-09-20 RX ADMIN — NIFEDIPINE SCH: 30 TABLET, FILM COATED, EXTENDED RELEASE ORAL at 10:50

## 2022-09-20 RX ADMIN — METRONIDAZOLE SCH MLS/HR: 5 INJECTION, SOLUTION INTRAVENOUS at 11:09

## 2022-09-20 RX ADMIN — INSULIN HUMAN SCH UNITS: 100 INJECTION, SOLUTION PARENTERAL at 16:26

## 2022-09-20 RX ADMIN — INSULIN HUMAN SCH UNITS: 100 INJECTION, SOLUTION PARENTERAL at 13:14

## 2022-09-20 RX ADMIN — INSULIN HUMAN SCH: 100 INJECTION, SUSPENSION SUBCUTANEOUS at 10:49

## 2022-09-20 RX ADMIN — MORPHINE SULFATE PRN MG: 4 INJECTION, SOLUTION INTRAMUSCULAR; INTRAVENOUS at 11:04

## 2022-09-20 RX ADMIN — FUROSEMIDE SCH MG: 10 INJECTION, SOLUTION INTRAVENOUS at 17:58

## 2022-09-20 RX ADMIN — POTASSIUM CHLORIDE SCH MLS/HR: 10 INJECTION, SOLUTION INTRAVENOUS at 20:12

## 2022-09-20 RX ADMIN — CEFEPIME SCH MLS/HR: 1 INJECTION, POWDER, FOR SOLUTION INTRAMUSCULAR; INTRAVENOUS at 13:14

## 2022-09-20 RX ADMIN — SODIUM HYPOCHLORITE SCH DOSE: 2.5 SOLUTION TOPICAL at 11:10

## 2022-09-20 RX ADMIN — SODIUM HYPOCHLORITE SCH APPLICATIO: 2.5 SOLUTION TOPICAL at 22:12

## 2022-09-20 RX ADMIN — CLOPIDOGREL BISULFATE SCH: 75 TABLET ORAL at 10:49

## 2022-09-20 RX ADMIN — METOPROLOL TARTRATE SCH MG: 5 INJECTION INTRAVENOUS at 17:58

## 2022-09-20 RX ADMIN — INSULIN HUMAN SCH UNIT: 100 INJECTION, SUSPENSION SUBCUTANEOUS at 16:23

## 2022-09-20 RX ADMIN — METOPROLOL TARTRATE SCH MG: 5 INJECTION INTRAVENOUS at 11:04

## 2022-09-20 RX ADMIN — METOPROLOL TARTRATE SCH: 100 TABLET, FILM COATED ORAL at 10:50

## 2022-09-20 RX ADMIN — Medication SCH ML: at 22:12

## 2022-09-20 RX ADMIN — CEFEPIME SCH MLS/HR: 1 INJECTION, POWDER, FOR SOLUTION INTRAMUSCULAR; INTRAVENOUS at 22:12

## 2022-09-20 RX ADMIN — CEFEPIME SCH MLS/HR: 1 INJECTION, POWDER, FOR SOLUTION INTRAMUSCULAR; INTRAVENOUS at 05:57

## 2022-09-20 RX ADMIN — CILOSTAZOL SCH: 100 TABLET ORAL at 10:49

## 2022-09-20 RX ADMIN — POTASSIUM CHLORIDE SCH MLS/HR: 10 INJECTION, SOLUTION INTRAVENOUS at 19:23

## 2022-09-20 NOTE — PROGRESS NOTE
Assessment and Plan





t sp transmet amp of 2,3,4,5 toes. he has minimalpain from this. Pt transferred 

to icu this am for managemnt of pulm edema. He is on BiPap at this time. Dakins 

solution soaks have been postponed with clinical change and transfer to the icu.







Pt denies any foot pain. He also notes nofurther abdo pain or diarrhea. C diff 

toxin assay sent 2 days ago is still pending. Pt wbc ct was 33.000 this am. 





Drain in the foot was removed. Begin bid wound irrigations with normal saline. 





Subjective


Date of service: 09/20/22


Patient Reports: Positive: feels better


Narrative: 





Pt sp transmet amp of 2,3,4,5 toes. he has minimalpain from this. Pt transferred

to icu this am for managemnt of pulm edema. He is on BiPap at this time. Dakins 

solution soaks have been postponed with clinical change and transfer to the icu.







Pt denies any foot pain. He also notes nofurther abdo pain or diarrhea. C diff 

toxin assay sent 2 days ago is still pending. Pt wbc ct was 33.000 this am. 





Drain in the foot was removed. Begin bid wound irrigations with normal saline. 





Objective


                               Vital Signs - 12hr











  09/20/22 09/20/22 09/20/22





  01:13 02:19 04:00


 


Temperature 100.1 F H  


 


Pulse Rate 103 H  103 H


 


Respiratory 22  





Rate   


 


Blood Pressure   


 


Blood Pressure 135/52  





[Left]   


 


O2 Sat by Pulse 96 98 





Oximetry   














  09/20/22 09/20/22 09/20/22





  06:15 06:30 09:56


 


Temperature 98.1 F  


 


Pulse Rate 20 L 120 H 122 H


 


Respiratory 20  





Rate   


 


Blood Pressure   


 


Blood Pressure 125/49  





[Left]   


 


O2 Sat by Pulse 97  





Oximetry   














  09/20/22 09/20/22 09/20/22





  10:00 10:10 10:11


 


Temperature   


 


Pulse Rate 124 H 126 H 125 H


 


Respiratory 31 H 28 H 35 H





Rate   


 


Blood Pressure 147/53 151/54 151/54


 


Blood Pressure   





[Left]   


 


O2 Sat by Pulse 91 100 99





Oximetry   














  09/20/22 09/20/22 09/20/22





  10:21 10:30 10:41


 


Temperature   


 


Pulse Rate 125 H  122 H


 


Respiratory 29 H  20





Rate   


 


Blood Pressure  148/61 148/61


 


Blood Pressure   





[Left]   


 


O2 Sat by Pulse 98 100 91





Oximetry   














  09/20/22 09/20/22 09/20/22





  10:50 10:51 11:00


 


Temperature   


 


Pulse Rate 122 H 121 H 122 H


 


Respiratory  24 25 H





Rate   


 


Blood Pressure 148/61 148/61 133/55


 


Blood Pressure   





[Left]   


 


O2 Sat by Pulse  100 100





Oximetry   














  09/20/22 09/20/22 09/20/22





  11:03 11:04 11:11


 


Temperature   


 


Pulse Rate 122 H 122 H 108 H


 


Respiratory   22





Rate   


 


Blood Pressure 133/55 133/55 133/55


 


Blood Pressure   





[Left]   


 


O2 Sat by Pulse   100





Oximetry   














  09/20/22





  12:08


 


Temperature 


 


Pulse Rate 113 H


 


Respiratory 33 H





Rate 


 


Blood Pressure 132/54


 


Blood Pressure 





[Left] 


 


O2 Sat by Pulse 100





Oximetry 














- Labs





                                 09/19/22 08:31





                                 09/19/22 08:31

## 2022-09-20 NOTE — PROGRESS NOTE
Assessment and Plan





Cultures:


9/10/2022 blood culture: No growth


9/13/2022 left third toe surgical culture: In process.  Gram stain appears mixed

with GPC in pairs, rare GNR.





A/P:


67-year-old male with diabetes, hypertension, peripheral vascular disease with 

previous history of RLE arterial bypass, right TMA, former smoker quit in 2006 

was admitted to the hospital with left third toe wound:





#Sepsis, secondary to diabetic foot infection, osteomyelitis of left foot third 

toe with abscess: Risk factors diabetes and peripheral vascular disease. WBC 

elevated. S/p revascularization on 9/12/2022, followed by toe amputation 

9/13/2022. Noted to have necrotic tissue.  MRI with abscess between the third 

and fourth toe also extensive bony destruction of the third fourth fifth and 

even the second toe. S/p TMA of second through fifth toes on 9/15/2022





#Thrombocytopenia: ?acute





#JASMEET: Improved.  Renally adjust antibiotics as needed.





#Peripheral vascular disease, history of RLE arterial bypass, prior right TMA. 

S/p revascularization on 9/12/2022.





#Diabetes mellitus type 2, uncontrolled





Recs:


-Continue IV cefepime + Daptomycin + Flagyl. Avoiding linezolid due to 

thrombocytopenia. 


-CK continues to downtrend, okay to continue IV daptomycin. 


-Surgical cultures are negative, white count remains elevated. 


-Follow up C diff PCR - could explain leukocytosis. 





TERRANCE Rios MD


[facility] ([facility]


O: [phone]


F: [phone]





Subjective


Date of service: 09/20/22


Principal diagnosis: PVD with critical limb


Interval history: 





Afebrile overnight, no acute change.  Cultures all remain negative.





Imaging personally viewed:


Echo: No evidence of vegetations





Objective





- Exam


Narrative Exam: 








Physical Exam: 


Constitutional: Alert, cooperative. No acute distress


Head, Ears, Nose: Normocephalic, atraumatic. 


Eyes: Conjunctivae/corneas clear. No icterus. No ptosis.


Neck: Supple, no meningeal signs


Cardiovascular: S1, S2 +


Respiratory: Good air entry, clear to auscultation bilaterally


GI: Soft, non-tender; bowel sounds normal. No peritoneal signs


Musculoskeletal: Right TMA well-healed, left foot dressing +


Skin: No rash or abscess


Hem/Lymphatic: No palpable cervical or supraclavicular nodes. No lymphangitis


Psych: Mood ok. Affect normal


Neurological: Awake, alert, oriented. No gross abnormality   





- Constitutional


Vitals: 


                                   Vital Signs











Temp Pulse Resp BP Pulse Ox


 


 98.1 F   108 H  22   133/55   100 


 


 09/20/22 06:15  09/20/22 11:11  09/20/22 11:11  09/20/22 11:11  09/20/22 11:11








                           Temperature -Last 24 Hours











Temperature                    98.1 F


 


Temperature                    100.1 F


 


Temperature                    99.2 F


 


Temperature                    98.4 F


 


Temperature                    98.8 F

















- Labs


CBC & Chem 7: 


                                 09/19/22 08:31





                                 09/19/22 08:31


Labs: 


                              Abnormal lab results











  09/19/22 09/19/22 09/19/22 Range/Units





  08:31 08:31 09:50 


 


WBC  33.1 H    (4.5-11.0)  K/mm3


 


RBC  2.58 L    (3.65-5.03)  M/mm3


 


Hgb  7.8 L    (11.8-15.2)  gm/dl


 


Hct  25.1 L    (35.5-45.6)  %


 


MCV  97 H    (84-94)  fl


 


MCHC  31 L    (32-34)  %


 


RDW  16.8 H    (13.2-15.2)  %


 


Plt Count  14 L*    (140-440)  K/mm3


 


Lymph # (Auto)  1.1 L    (1.2-5.4)  K/mm3


 


Mono # (Auto)  1.8 H    (0.0-0.8)  K/mm3


 


Baso # (Auto)  0.2 H    (0.0-0.1)  K/mm3


 


Seg Neutrophils %  89.3 H    (40.0-70.0)  %


 


Seg Neutrophils #  28.0 H    (1.8-7.7)  K/mm3


 


Sodium   136 L   (137-145)  mmol/L


 


Carbon Dioxide   19 L   (22-30)  mmol/L


 


Glucose   374 H   ()  mg/dL


 


POC Glucose     ()  mg/dL


 


Calcium   7.3 L   (8.4-10.2)  mg/dL


 


NT-Pro-B Natriuret Pep    3313 H  (0-900)  pg/mL














  09/19/22 09/19/22 09/19/22 Range/Units





  11:31 16:13 21:50 


 


WBC     (4.5-11.0)  K/mm3


 


RBC     (3.65-5.03)  M/mm3


 


Hgb     (11.8-15.2)  gm/dl


 


Hct     (35.5-45.6)  %


 


MCV     (84-94)  fl


 


MCHC     (32-34)  %


 


RDW     (13.2-15.2)  %


 


Plt Count     (140-440)  K/mm3


 


Lymph # (Auto)     (1.2-5.4)  K/mm3


 


Mono # (Auto)     (0.0-0.8)  K/mm3


 


Baso # (Auto)     (0.0-0.1)  K/mm3


 


Seg Neutrophils %     (40.0-70.0)  %


 


Seg Neutrophils #     (1.8-7.7)  K/mm3


 


Sodium     (137-145)  mmol/L


 


Carbon Dioxide     (22-30)  mmol/L


 


Glucose     ()  mg/dL


 


POC Glucose  310 H  229 H  181 H  ()  mg/dL


 


Calcium     (8.4-10.2)  mg/dL


 


NT-Pro-B Natriuret Pep     (0-900)  pg/mL














  09/20/22 Range/Units





  08:24 


 


WBC   (4.5-11.0)  K/mm3


 


RBC   (3.65-5.03)  M/mm3


 


Hgb   (11.8-15.2)  gm/dl


 


Hct   (35.5-45.6)  %


 


MCV   (84-94)  fl


 


MCHC   (32-34)  %


 


RDW   (13.2-15.2)  %


 


Plt Count   (140-440)  K/mm3


 


Lymph # (Auto)   (1.2-5.4)  K/mm3


 


Mono # (Auto)   (0.0-0.8)  K/mm3


 


Baso # (Auto)   (0.0-0.1)  K/mm3


 


Seg Neutrophils %   (40.0-70.0)  %


 


Seg Neutrophils #   (1.8-7.7)  K/mm3


 


Sodium   (137-145)  mmol/L


 


Carbon Dioxide   (22-30)  mmol/L


 


Glucose   ()  mg/dL


 


POC Glucose  200 H  ()  mg/dL


 


Calcium   (8.4-10.2)  mg/dL


 


NT-Pro-B Natriuret Pep   (0-900)  pg/mL

## 2022-09-20 NOTE — XRAY REPORT
CHEST 1 VIEW 9/20/2022 1:33 PM



INDICATION / CLINICAL INFORMATION: Respiratory failure. 



COMPARISON: 9/18/2022



FINDINGS:



SUPPORT DEVICES: None.

HEART / MEDIASTINUM: No significant abnormality. 

LUNGS / PLEURA: Worsening bilateral mixed interstitial and alveolar infiltrates, which may represent 
progression of pulmonary edema versus multifocal pneumonia. Clinical correlation is needed. No signif
icant pleural effusion. No pneumothorax. 



ADDITIONAL FINDINGS: None



IMPRESSION:

1. Worsening bilateral mixed interstitial and alveolar infiltrates, which may represent progression o
f pulmonary edema versus multifocal pneumonia. Clinical correlation is needed. 





Signer Name: Ke June MD 

Signed: 9/20/2022 2:35 PM

Workstation Name: [email or web address]

## 2022-09-20 NOTE — PROGRESS NOTE
Assessment and Plan


Assessment and plan: 


--Acute hypoxic respiratory failure; and unresponsiveness


Requiring BiPAP, possible need for intubation and ventilatory support


Pulmonary critical consulted, transfer the patient to ICU for possible intuba

tion


However in ICU Patient's O2 sats improved on BiPAP


And patient became more alert and awake responding to simple commands 

appropriately





Chest x-ray revealed mild interstitial pulmonary edema 9/18/2022


echocardiogram EF within normal limits


Continue IV Lasix


Pulmonary critical consulted


Chest x-ray, blood work





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


Diabetic foot infection with abscess of left third digit


Osteomyelitis of left foot


Peripheral arterial disease of bilateral lower extremities


WBC elevated. S/p revascularization on 9/12/2022, followed by toe amputation 

9/13/2022. Noted to have necrotic tissue.  MRI with abscess between the third 

and fourth toe also extensive bony destruction of the third fourth fifth and 

even the second toe. S/p TMA of second through fifth toes on 9/15/2022


Continue IV cefepime + Daptomycin + Flagyl. Avoiding linezolid due to 

thrombocytopenia. 





Diarrhea ; improved





Hypokalemia ;


Replenish with potassium chloride IV


Follow electrolytes





insulin dependent type II diabetes mellitus with hyperglycemiaimproving


Worsened by diabetic foot infection


- hemoglobin A1c: 6.5


- home regimen: Unknown


Accu-Chek, sliding scale coverage, long-acting insulin 70/30


- blood glucose goal 140-180 while inpatient


- continue to monitor and adjust the dose as needed





Pseudohyponatremiaresolved





JASMEET on likely CKD stage IIIresolved


Mild elevation of creatinine; 1.5 today[9/20/2022]


IV fluids


Monitor renal function avoid nephrotoxins


Renal dosing of medications


Renally dose meds and avoid nephrotoxic drugs.


Monitor BMP





Hypertension/moderate control


Continue current antihypertensives


And as needed medications


- current medications: Started nifedipine 30 mg daily


- SBP goal <160 and DBP goal <90 while inpatient


- continue to monitor





Iron deficiency anemia


anemia of chronic disease


Hemoglobin 6.7


Received 1 unit of PRBC


Monitor H&H transfuse additional PRBC as needed


Most recent hemoglobin 7.8[9/19/2022]





Acute thrombocytopenia


status post transfusion of total 3 units of platelets


Today platelets are 30 [improved from 14-30]


Patient endorses being aware but not having etiology determined by other 

clinicians.


Hematology following





Moderate protein caloric malnutrition


Albumin 3.6


Will initiate dietary supplementation once hyperglycemia is under control.





DVT prophylaxis ;


SCDs/no pharmacologic anticoagulation due to thrombocytopenia





--Full code ;





Disposition: Patient is transferred to ICU





Discharge planning:


Case management reports placement at a group home.  When medically stable.


As patient has acute respiratory failure on BiPAP and transferred to ICU.


Disposition depends on clinical course





We will closely monitor the patient and adjust the management as needed


Consultants evaluation recommendations noted and appreciated





Family discussion


I discussed in detail with patient's ?aunt and patient's ?brother when they were

visiting ICU


Patient's condition, change of status, tests and reports, consultants 

recommendation


Treatment plan, prognosis.


They had many questions I answered all of them, and agreed with treatment plan.





Critical care time 62 minutes;








The high probability of a clinically significant, sudden or life threatening 

deterioration of the [Multi] system(s) required my full and direct attention, 

intervention and personal management. The aggregate critical care time was [62] 

minutes. This time is in addition to time spent performing reported procedures 

but includes the following: 





[x] Data Review and interpretation 





[x] Patient assessment and monitoring of vital signs 





[x] Documentation 





[x] Medication orders and management





History


Interval history: 


67-year-old male patient was admitted with lower extremity gangrene of the toes 

evaluated by vascular/surgeon underwent toe amputation,


Patient was hypoxic requiring supplemental oxygen 15 L/high flow, BiPAP as 

needed suddenly went into acute respiratory failure, and unresponsiveness, code 

met was called,


Upon responding to code met, patient was severely hypoxemic on BiPAP respiratory

 therapist recommended intubation and ventilatory support


On auscultation bilateral basal crackles, advised 40 mg of IV Lasix x1 dose.  I 

discussed with charge nurse of ICU and pulmonary critical Dr. Marmolejo


To transfer the patient to ICU for possible intubation and further management.  

I also requested critical care consult.


On arrival to ICU, patient was more alert, slightly improved, intubation held 

and pulmonary critical recommended BiPAP


Set of labs, chest x-ray, EKG and ABG was requested.











Hospitalist Physical





- Constitutional


Vitals: 


                                        











Temp Pulse Resp BP Pulse Ox


 


 98.1 F   113 H  33 H  132/54   100 


 


 09/20/22 06:15  09/20/22 12:08  09/20/22 12:08  09/20/22 12:08  09/20/22 12:08











General appearance: Present: mild distress, well-nourished, other (On BiPAP)





- EENT


Eyes: Present: PERRL, EOM intact





- Neck


Neck: Present: supple, normal ROM





- Respiratory


Respiratory effort: normal


Respiratory: bilateral: diminished, rales, negative: rhonchi, wheezing





- Cardiovascular


Rhythm: regular


Heart Sounds: Present: S1 & S2





- Extremities


Extremities: No edema, abnormal (TMA right 2,3,4,5 toes, dressing in place)





- Abdominal


General gastrointestinal: soft, non-tender, non-distended, normal bowel sounds





- Integumentary


Integumentary: Present: clear, warm





- Psychiatric


Psychiatric: other (Minimally communicative in distress)





- Neurologic


Neurologic: other (Unresponsive, confused)





Results





- Labs


CBC & Chem 7: 


                                 09/20/22 15:03





                                 09/20/22 15:03


Labs: 


                             Laboratory Last Values











WBC  33.1 K/mm3 (4.5-11.0)  H  09/19/22  08:31    


 


RBC  2.58 M/mm3 (3.65-5.03)  L  09/19/22  08:31    


 


Hgb  7.8 gm/dl (11.8-15.2)  L  09/19/22  08:31    


 


Hct  25.1 % (35.5-45.6)  L  09/19/22  08:31    


 


MCV  97 fl (84-94)  H  09/19/22  08:31    


 


MCH  30 pg (28-32)   09/19/22  08:31    


 


MCHC  31 % (32-34)  L  09/19/22  08:31    


 


RDW  16.8 % (13.2-15.2)  H  09/19/22  08:31    


 


Plt Count  14 K/mm3 (140-440)  L*  09/19/22  08:31    


 


Lymph % (Auto)  16.2 % (13.4-35.0)   09/12/22  Unknown


 


Mono % (Auto)  5.9 % (0.0-7.3)   09/19/22  08:31    


 


Eos % (Auto)  0.1 % (0.0-4.3)   09/19/22  08:31    


 


Baso % (Auto)  0.4 % (0.0-1.8)   09/19/22  08:31    


 


Lymph # (Auto)  1.1 K/mm3 (1.2-5.4)  L  09/19/22  08:31    


 


Mono # (Auto)  1.8 K/mm3 (0.0-0.8)  H  09/19/22  08:31    


 


Eos # (Auto)  0.0 K/mm3 (0.0-0.4)   09/19/22  08:31    


 


Baso # (Auto)  0.2 K/mm3 (0.0-0.1)  H  09/19/22  08:31    


 


Add Manual Diff  Complete   09/17/22  07:58    


 


Total Counted  100   09/17/22  07:58    


 


Seg Neutrophils %  89.3 % (40.0-70.0)  H  09/19/22  08:31    


 


Seg Neuts % (Manual)  86.0 % (40.0-70.0)  H  09/17/22  07:58    


 


Band Neutrophils %  0 %  09/17/22  07:58    


 


Lymphocytes % (Manual)  4.0 % (13.4-35.0)  L  09/17/22  07:58    


 


Reactive Lymphs % (Man)  0 %  09/17/22  07:58    


 


Monocytes % (Manual)  10.0 % (0.0-7.3)  H  09/17/22  07:58    


 


Eosinophils % (Manual)  0 % (0.0-4.3)   09/17/22  07:58    


 


Basophils % (Manual)  0 % (0.0-1.8)   09/17/22  07:58    


 


Metamyelocytes %  0 %  09/17/22  07:58    


 


Myelocytes %  0 %  09/17/22  07:58    


 


Promyelocytes %  0 %  09/17/22  07:58    


 


Blast Cells %  0 %  09/17/22  07:58    


 


Nucleated RBC %  Not Reportable   09/17/22  07:58    


 


Seg Neutrophils #  28.0 K/mm3 (1.8-7.7)  H  09/19/22  08:31    


 


Seg Neutrophils # Man  25.3 K/mm3 (1.8-7.7)  H  09/17/22  07:58    


 


Band Neutrophils #  0.0 K/mm3  09/17/22  07:58    


 


Lymphocytes # (Manual)  1.2 K/mm3 (1.2-5.4)   09/17/22  07:58    


 


Abs React Lymphs (Man)  0.0 K/mm3  09/17/22  07:58    


 


Monocytes # (Manual)  2.9 K/mm3 (0.0-0.8)  H  09/17/22  07:58    


 


Eosinophils # (Manual)  0.0 K/mm3 (0.0-0.4)   09/17/22  07:58    


 


Basophils # (Manual)  0.0 K/mm3 (0.0-0.1)   09/17/22  07:58    


 


Metamyelocytes #  0.0 K/mm3  09/17/22  07:58    


 


Myelocytes #  0.0 K/mm3  09/17/22  07:58    


 


Promyelocytes #  0.0 K/mm3  09/17/22  07:58    


 


Blast Cells #  0.0 K/mm3  09/17/22  07:58    


 


WBC Morphology  Not Reportable   09/17/22  07:58    


 


WBC Morphology  TNR   09/17/22  07:58    


 


Hypersegmented Neuts  Not Reportable   09/17/22  07:58    


 


Hyposegmented Neuts  Not Reportable   09/17/22  07:58    


 


Hypogranular Neuts  Not Reportable   09/17/22  07:58    


 


Smudge Cells  Not Reportable   09/17/22  07:58    


 


Toxic Granulation  Not Reportable   09/17/22  07:58    


 


Toxic Vacuolation  Not Reportable   09/17/22  07:58    


 


Dohle Bodies  Not Reportable   09/17/22  07:58    


 


Pelger-Huet Anomaly  Not Reportable   09/17/22  07:58    


 


Olive Rods  Not Reportable   09/17/22  07:58    


 


Platelet Estimate  Appears normal   09/17/22  07:58    


 


Clumped Platelets  1+   09/17/22  07:58    


 


Plt Clumps, EDTA  Not Reportable   09/17/22  07:58    


 


Large Platelets  Not Reportable   09/17/22  07:58    


 


Giant Platelets  Not Reportable   09/17/22  07:58    


 


Platelet Satelliting  Not Reportable   09/17/22  07:58    


 


Plt Morphology Comment  Not Reportable   09/17/22  07:58    


 


RBC Morphology  Not Reportable   09/17/22  07:58    


 


Dimorphic RBCs  Not Reportable   09/17/22  07:58    


 


Polychromasia  Few   09/17/22  07:58    


 


Hypochromasia  Few   09/17/22  07:58    


 


Poikilocytosis  Not Reportable   09/17/22  07:58    


 


Anisocytosis  Not Reportable   09/17/22  07:58    


 


Microcytosis  Not Reportable   09/17/22  07:58    


 


Macrocytosis  Not Reportable   09/17/22  07:58    


 


Spherocytes  Not Reportable   09/17/22  07:58    


 


Pappenheimer Bodies  Not Reportable   09/17/22  07:58    


 


Sickle Cells  Not Reportable   09/17/22  07:58    


 


Target Cells  Few   09/17/22  07:58    


 


Tear Drop Cells  Not Reportable   09/17/22  07:58    


 


Ovalocytes  Not Reportable   09/17/22  07:58    


 


Helmet Cells  Not Reportable   09/17/22  07:58    


 


Soler-Wolfdale Bodies  Not Reportable   09/17/22  07:58    


 


Cabot Rings  Not Reportable   09/17/22  07:58    


 


Jamison Cells  Not Reportable   09/17/22  07:58    


 


Bite Cells  Not Reportable   09/17/22  07:58    


 


Crenated Cell  Not Reportable   09/17/22  07:58    


 


Elliptocytes  Not Reportable   09/17/22  07:58    


 


Acanthocytes (Spur)  Not Reportable   09/17/22  07:58    


 


Rouleaux  Not Reportable   09/17/22  07:58    


 


Hemoglobin C Crystals  Not Reportable   09/17/22  07:58    


 


Schistocytes  Not Reportable   09/17/22  07:58    


 


Malaria parasites  Not Reportable   09/17/22  07:58    


 


Tej Bodies  Not Reportable   09/17/22  07:58    


 


Hem Pathologist Commnt  No   09/17/22  07:58    


 


PT  14.5 Sec. (12.2-14.9)   09/12/22  Unknown


 


INR  0.99  (0.87-1.13)   09/12/22  Unknown


 


ABG pH  7.528 pH Units (7.350-7.450)  H  09/18/22  11:56    


 


ABG pCO2  30.0 mm Hg  09/18/22  11:56    


 


ABG pO2  125.0 mm Hg (80.0-90.0)  H  09/18/22  11:56    


 


ABG HCO3  24.4 mmol/L (20.0-26.0)   09/18/22  11:56    


 


ABG O2 Saturation  98.7 % (95.0-99.0)   09/18/22  11:56    


 


ABG O2 Content  9.7  (0.0-44)   09/18/22  11:56    


 


ABG Base Excess  1.6 mmol/L (-2.0-3.0)   09/18/22  11:56    


 


ABG Hemoglobin  6.9 gm/dl (14.0-18.0)  L  09/18/22  11:56    


 


ABG Carboxyhemoglobin  1.6 % (0.0-5.0)   09/18/22  11:56    


 


ABG Methemoglobin  0.4 % (0.0-1.5)   09/18/22  11:56    


 


Oxyhemoglobin  96.6 % (95.0-99.0)   09/18/22  11:56    


 


FiO2  60 %  09/18/22  11:56    


 


Sodium  136 mmol/L (137-145)  L  09/19/22  08:31    


 


Potassium  3.8 mmol/L (3.6-5.0)   09/19/22  08:31    


 


Chloride  98.7 mmol/L ()   09/19/22  08:31    


 


Carbon Dioxide  19 mmol/L (22-30)  L  09/19/22  08:31    


 


Anion Gap  22 mmol/L  09/19/22  08:31    


 


BUN  19 mg/dL (9-20)   09/19/22  08:31    


 


Creatinine  1.3 mg/dL (0.8-1.3)   09/19/22  08:31    


 


Estimated GFR  > 60 ml/min  09/19/22  08:31    


 


BUN/Creatinine Ratio  15 %  09/19/22  08:31    


 


Glucose  374 mg/dL ()  H  09/19/22  08:31    


 


POC Glucose  200 mg/dL ()  H  09/20/22  08:24    


 


Hemoglobin A1c  6.5 % (4-6)  H  09/10/22  08:41    


 


Lactic Acid  1.50 mmol/L (0.7-2.0)   09/10/22  02:26    


 


Calcium  7.3 mg/dL (8.4-10.2)  L  09/19/22  08:31    


 


Magnesium  1.80 mg/dL (1.7-2.3)   09/17/22  10:44    


 


Iron  16 ug/dL ()  L  09/11/22  05:35    


 


TIBC  133 mcg/dL (250-450)  L  09/11/22  05:35    


 


Ferritin  2888.0 ng/mL (30.0-300.0)  H  09/11/22  05:35    


 


Total Bilirubin  0.70 mg/dL (0.1-1.2)   09/10/22  02:26    


 


AST  41 units/L (5-40)  H  09/10/22  02:26    


 


ALT  28 units/L (7-56)   09/10/22  02:26    


 


Alkaline Phosphatase  129 units/L ()   09/10/22  02:26    


 


Total Creatine Kinase  95 units/L ()   09/17/22  07:58    


 


NT-Pro-B Natriuret Pep  3313 pg/mL (0-900)  H  09/19/22  09:50    


 


Total Protein  6.9 g/dL (6.3-8.2)   09/10/22  02:26    


 


Albumin  3.6 g/dL (3.9-5)  L  09/10/22  02:26    


 


Albumin/Globulin Ratio  1.1 %  09/10/22  02:26    


 


Triglycerides  114 mg/dL (2-149)   09/10/22  08:41    


 


Cholesterol  116 mg/dL ()   09/10/22  08:41    


 


LDL Cholesterol Direct  46 mg/dL ()  L  09/10/22  08:41    


 


HDL Cholesterol  37 mg/dL (40-59)  L  09/10/22  08:41    


 


Cholesterol/HDL Ratio  3.13 %  09/10/22  08:41    


 


Vitamin B12  > 2000 pg/mL (211-911)  H  09/17/22  07:58    


 


Blood Type  O POSITIVE   09/18/22  16:19    


 


Antibody Screen  Negative   09/18/22  16:19    


 


Crossmatch  See Detail   09/18/22  16:19    











Microbiology: 


Microbiology





09/18/22 11:34   Peripheral/Venous   Blood Culture - Preliminary


                            NO GROWTH AFTER 24 HOURS


09/18/22 11:34   Peripheral/Venous   Blood Culture - Preliminary


                            NO GROWTH AFTER 24 HOURS








Toney/IV: 


                                        





Voiding Method                   Condom Catheter











Active Medications





- Current Medications


Current Medications: 














Generic Name Dose Route Start Last Admin





  Trade Name Freq  PRN Reason Stop Dose Admin


 


Acetaminophen  650 mg  09/10/22 08:30  09/18/22 20:37





  Acetaminophen 325 Mg Tab  PO   650 mg





  Q4H PRN   Administration





  Pain MILD(1-3)/Fever >100.5/HA  


 


Atorvastatin Calcium  40 mg  09/13/22 11:00  09/20/22 10:49





  Atorvastatin 40 Mg Tab  PO   Not Given





  DAILY ELVIE  


 


Cilostazol  100 mg  09/12/22 12:00  09/20/22 10:49





  Cilostazol 100 Mg Tab  PO   Not Given





  BID ELVIE  


 


Clopidogrel Bisulfate  75 mg  09/13/22 10:00  09/20/22 10:49





  Clopidogrel 75 Mg Tab  PO   Not Given





  QDAY [facility]  


 


Dextrose  50 ml  09/10/22 08:35  09/18/22 16:13





  Dextrose 50% In Water (25gm) 50 Ml Syringe  IV   25 ml





  Q30MIN PRN   Administration





  Hypoglycemia  





  Protocol  


 


Sodium Chloride  1,000 mls @ 100 mls/hr  09/10/22 05:30  09/20/22 11:11





  Nacl 0.9% 1000 Ml  IV   Infused





  AS DIRECT ELVIE   Infusion


 


Daptomycin 500 mg/ Sodium  100 mls @ 200 mls/hr  09/13/22 18:00  09/19/22 21:15





  Chloride  IV   200 mls/hr





  Q24H ELVIE   Administration





  Protocol  


 


Metronidazole  500 mg in 100 mls @ 100 mls/hr  09/15/22 12:00  09/20/22 11:09





  Flagyl 500 Mg/100 Ml  IV   100 mls/hr





  Q8H ELVIE   Administration





  Protocol  


 


Cefepime HCl  1 gm in 100 mls @ 200 mls/hr  09/15/22 14:00  09/20/22 05:57





  Cefepime/Ns 1 Gm/100 Ml  IV   200 mls/hr





  Q8H ELVIE   Administration





  Protocol  


 


Insulin Human Isoph/Insulin Regular  18 unit  09/19/22 11:00  09/20/22 10:49





  Insulin Nph/Regular 70/30 Inj  SUB-Q   Not Given





  BIDDIAB [facility]  


 


Insulin Human Regular  0 units  09/17/22 07:30  09/20/22 08:30





  Insulin Regular, Human 100 Units/1 Ml  SUB-Q   Not Given





  ACHS [facility]  





  Protocol  


 


Metoclopramide HCl  10 mg  09/12/22 17:34  09/18/22 20:54





  Metoclopramide 10 Mg/2 Ml Inj  IV   10 mg





  Q6H PRN   Administration





  Nausea And Vomiting  


 


Metoprolol Tartrate  5 mg  09/20/22 12:00  09/20/22 11:04





  Metoprolol Tartrate 5 Mg/5 Ml Inj  IV   5 mg





  Q6HR ELVIE   Administration


 


Morphine Sulfate  2 mg  09/10/22 08:30  09/20/22 11:04





  Morphine 4 Mg/1 Ml Inj  IV   2 mg





  Q4H PRN   Administration





  Pain , Severe (7-10)  


 


Nifedipine  30 mg  09/11/22 10:00  09/20/22 10:50





  Nifedipine Xl 30 Mg Tab  PO   Not Given





  QDAY ELVIE  


 


Ondansetron HCl  4 mg  09/10/22 08:30  09/18/22 19:18





  Ondansetron 4 Mg/2 Ml Inj  IV   4 mg





  Q8H PRN   Administration





  Nausea And Vomiting  


 


Oxycodone/Acetaminophen  1 tab  09/10/22 08:30  09/19/22 16:24





  Oxycodone /Acetaminophen 5-325mg Tab  PO   1 tab





  Q6H PRN   Administration





  Pain, Moderate (4-6)  


 


Phenol  1 spray  09/20/22 01:14  09/20/22 01:43





  Phenol 1.4% 177 Ml Bottle  MM   1 spray





  PRN PRN   Administration





  Sore Throat  


 


Sodium Chloride  10 ml  09/10/22 10:00  09/20/22 11:06





  Sodium Chloride 0.9% 10 Ml Flush Syringe  IV   10 ml





  BID ELVIE   Administration


 


Sodium Chloride  10 ml  09/10/22 08:30 





  Sodium Chloride 0.9% 10 Ml Flush Syringe  IV  





  PRN PRN  





  LINE FLUSH  


 


Sodium Hypochlorite  1 applic  09/17/22 10:00  09/20/22 11:10





  Sodium Hypochlorite, Dakin's 1/2 Strength (0.25%) 473 Ml Topical Soln  TP   1 

dose





  BID ELVIE   Administration














Nutrition/Malnutrition Assess





- Dietary Evaluation


Nutrition/Malnutrition Findings: 


                                        





Nutrition Notes                                            Start:  09/11/22 

11:33


Freq:                                                      Status: Active       

 


Protocol:                                                                       

 


 Document     09/16/22 13:47  CM  (Rec: 09/16/22 13:56  CM  [id number])


 Co-Sign      09/16/22 13:47  WW


 Nutrition Notes


     Initial or Follow up                        Brief Note


     Current Diagnosis                           Diabetes,Hypertension


     Other Pertinent Diagnosis                   PVD s/p RLE bypass,


                                                 thrombocytopenia


     Current Diet                                Cardiac Diet + Glucerna BID


     Labs/Tests                                  9/16:


                                                 Glu 158


     Pertinent Medications                       9/16:


                                                 Atorvastatin


                                                 Humulin 70/30


                                                 Humulin R


     Height                                      5 ft 8 in


     Weight                                      70.1 kg


     Usual Body Weight                           75 kg


     Ideal Body Weight (kg)                      70.00


     BMI                                         23.5


     Weight change and time frame                2% wt loss in 1 day - will


                                                 monitor.


     Weight Status                               Appropriate


     Subjective/Other Information                RD follow-up per protocol.


                                                 Pt advanced to cardiac diet


                                                 and Glucerna BID ordered.


                                                 100% of dinner consumed 9/15.


                                                 New wt added today.


     Percent of energy/protein needs met:        Cardiac Diet provides 2230kcal


                                                 / 85g PRO q day - 113% Kcal /


                                                 99% AA before nutrition


                                                 supplements.


     GI Symptoms                                 None


     #1


      Nutrition Diagnosis                        Malnutrition


      Diagnosis Progress(for reassessment        Continues


       documentation)                            


     Is patient on ventilator?                   No


     Is Patient Ambulatory and/or Out of Bed     No


     REE-([place]-confined to bed)      [phone]


     Kcal/Kg value to use for calculation        28


     Approximate Energy Requirements Using       1963


      kcal/Kg                                    


     Calculation Used for Recommendations        Kcal/kg


     Additional Notes                            1.2-1.5g/kg ABW; 86-108g PRO q


                                                 day


                                                 Fluid needs: 30mL/kg or per MD


                                                 .


 Nutrition Intervention


     Change Diet Order:                          Continue current diet order or


                                                 per SLP recommendations.


     Add Supplement/Snack (indicate name/kcal    Glucerna BID (Chocolate)


      /protein )                                 


     Provides kCal:                              440


     Provides Protein (gm)                       20


     Goal #1                                     Pt to consume >75% of


                                                 estimated energy/protein needs


                                                 through current diet order


                                                 and nutrition supplements


     Goal #2                                     Pt to maintain current wt


                                                 status within 2.5% throughout


                                                 LOS.


     Follow-Up By:                               09/23/22


     Additional Comments                         Monitor % PO intake, diet


                                                 tolerance, and wt status.

## 2022-09-21 LAB
ALBUMIN SERPL-MCNC: 2 G/DL (ref 3.9–5)
ALT SERPL-CCNC: 30 UNITS/L (ref 7–56)
BASOPHILS # (AUTO): 0.2 K/MM3 (ref 0–0.1)
BASOPHILS NFR BLD AUTO: 0.8 % (ref 0–1.8)
BUN SERPL-MCNC: 35 MG/DL (ref 9–20)
BUN/CREAT SERPL: 21 %
CALCIUM SERPL-MCNC: 7.5 MG/DL (ref 8.4–10.2)
EOSINOPHIL # BLD AUTO: 0.1 K/MM3 (ref 0–0.4)
EOSINOPHIL NFR BLD AUTO: 0.2 % (ref 0–4.3)
HCO3 BLDA-SCNC: 19.5 MMOL/L (ref 20–26)
HCO3 BLDA-SCNC: 19.8 MMOL/L (ref 20–26)
HCO3 BLDA-SCNC: 22.9 MMOL/L (ref 20–26)
HCT VFR BLD CALC: 22.7 % (ref 35.5–45.6)
HEMOLYSIS INDEX: 2
HGB BLD-MCNC: 7.5 GM/DL (ref 11.8–15.2)
LYMPHOCYTES # BLD AUTO: 2.6 K/MM3 (ref 1.2–5.4)
LYMPHOCYTES NFR BLD AUTO: 8.6 % (ref 13.4–35)
MCHC RBC AUTO-ENTMCNC: 33 % (ref 32–34)
MCV RBC AUTO: 94 FL (ref 84–94)
MONOCYTES # (AUTO): 0.8 K/MM3 (ref 0–0.8)
MONOCYTES % (AUTO): 2.8 % (ref 0–7.3)
PCO2 BLDA: 34.1 MM HG
PCO2 BLDA: 42 MM HG
PCO2 BLDA: 43.5 MM HG
PH BLDA: 7.27 PH UNITS (ref 7.35–7.45)
PH BLDA: 7.29 PH UNITS (ref 7.35–7.45)
PH BLDA: 7.45 PH UNITS (ref 7.35–7.45)
PLATELET # BLD: 22 K/MM3 (ref 140–440)
PO2 BLDA: 46.4 MM HG (ref 80–90)
PO2 BLDA: 54.4 MM HG (ref 80–90)
PO2 BLDA: 80.6 MM HG (ref 80–90)
RBC # BLD AUTO: 2.41 M/MM3 (ref 3.65–5.03)

## 2022-09-21 PROCEDURE — 02HV33Z INSERTION OF INFUSION DEVICE INTO SUPERIOR VENA CAVA, PERCUTANEOUS APPROACH: ICD-10-PCS | Performed by: STUDENT IN AN ORGANIZED HEALTH CARE EDUCATION/TRAINING PROGRAM

## 2022-09-21 PROCEDURE — 03HY32Z INSERTION OF MONITORING DEVICE INTO UPPER ARTERY, PERCUTANEOUS APPROACH: ICD-10-PCS

## 2022-09-21 PROCEDURE — 5A1945Z RESPIRATORY VENTILATION, 24-96 CONSECUTIVE HOURS: ICD-10-PCS | Performed by: STUDENT IN AN ORGANIZED HEALTH CARE EDUCATION/TRAINING PROGRAM

## 2022-09-21 PROCEDURE — 0BH17EZ INSERTION OF ENDOTRACHEAL AIRWAY INTO TRACHEA, VIA NATURAL OR ARTIFICIAL OPENING: ICD-10-PCS | Performed by: STUDENT IN AN ORGANIZED HEALTH CARE EDUCATION/TRAINING PROGRAM

## 2022-09-21 RX ADMIN — METOPROLOL TARTRATE SCH: 5 INJECTION INTRAVENOUS at 18:39

## 2022-09-21 RX ADMIN — Medication SCH MLS/HR: at 10:38

## 2022-09-21 RX ADMIN — INSULIN GLARGINE SCH UNITS: 100 INJECTION, SOLUTION SUBCUTANEOUS at 22:41

## 2022-09-21 RX ADMIN — INSULIN HUMAN SCH UNITS: 100 INJECTION, SOLUTION PARENTERAL at 11:34

## 2022-09-21 RX ADMIN — VANCOMYCIN HYDROCHLORIDE SCH MG: KIT at 13:49

## 2022-09-21 RX ADMIN — INSULIN HUMAN SCH: 100 INJECTION, SOLUTION PARENTERAL at 08:00

## 2022-09-21 RX ADMIN — METRONIDAZOLE SCH MLS/HR: 5 INJECTION, SOLUTION INTRAVENOUS at 04:40

## 2022-09-21 RX ADMIN — CILOSTAZOL SCH MG: 100 TABLET ORAL at 11:25

## 2022-09-21 RX ADMIN — VANCOMYCIN HYDROCHLORIDE SCH MG: KIT at 18:42

## 2022-09-21 RX ADMIN — INSULIN HUMAN SCH UNITS: 100 INJECTION, SOLUTION PARENTERAL at 22:41

## 2022-09-21 RX ADMIN — METOPROLOL TARTRATE SCH MG: 5 INJECTION INTRAVENOUS at 00:10

## 2022-09-21 RX ADMIN — SODIUM HYPOCHLORITE SCH APPLICATIO: 2.5 SOLUTION TOPICAL at 11:35

## 2022-09-21 RX ADMIN — SENNOSIDES AND DOCUSATE SODIUM SCH TAB: 50; 8.6 TABLET ORAL at 22:27

## 2022-09-21 RX ADMIN — INSULIN HUMAN SCH UNITS: 100 INJECTION, SOLUTION PARENTERAL at 16:51

## 2022-09-21 RX ADMIN — MEROPENEM SCH MLS/HR: 1 INJECTION, POWDER, FOR SOLUTION INTRAVENOUS at 14:00

## 2022-09-21 RX ADMIN — FAMOTIDINE SCH MG: 20 TABLET ORAL at 22:27

## 2022-09-21 RX ADMIN — METOPROLOL TARTRATE SCH MG: 5 INJECTION INTRAVENOUS at 06:05

## 2022-09-21 RX ADMIN — SODIUM HYPOCHLORITE SCH APPLICATIO: 2.5 SOLUTION TOPICAL at 22:27

## 2022-09-21 RX ADMIN — Medication SCH MLS/HR: at 15:05

## 2022-09-21 RX ADMIN — CILOSTAZOL SCH MG: 100 TABLET ORAL at 22:27

## 2022-09-21 RX ADMIN — METOPROLOL TARTRATE SCH: 5 INJECTION INTRAVENOUS at 12:39

## 2022-09-21 RX ADMIN — Medication SCH ML: at 22:27

## 2022-09-21 RX ADMIN — CLOPIDOGREL BISULFATE SCH MG: 75 TABLET ORAL at 11:25

## 2022-09-21 RX ADMIN — MEROPENEM SCH MLS/HR: 1 INJECTION, POWDER, FOR SOLUTION INTRAVENOUS at 20:45

## 2022-09-21 RX ADMIN — SENNOSIDES AND DOCUSATE SODIUM SCH TAB: 50; 8.6 TABLET ORAL at 11:34

## 2022-09-21 RX ADMIN — CEFEPIME SCH MLS/HR: 1 INJECTION, POWDER, FOR SOLUTION INTRAMUSCULAR; INTRAVENOUS at 06:00

## 2022-09-21 RX ADMIN — Medication SCH ML: at 11:34

## 2022-09-21 RX ADMIN — FUROSEMIDE SCH MG: 10 INJECTION, SOLUTION INTRAVENOUS at 05:35

## 2022-09-21 NOTE — EVENT NOTE
Date: 09/21/22 (Intubation)


Requested to intubate patient





0912 Etomidate 20mg + Og 100mg


Glidescope X 1 attempt. Thick secretions suctioned from OP


8.0 OETT easily passed. 


+BBS/CO2


VSS


Done by Cruz Cabrera

## 2022-09-21 NOTE — PROGRESS NOTE
Assessment and Plan





66 y/o male with gangrene of foot, osteomyelitis s/p amputation now with acute 

respiratory failure concern for volume overload





9/21/22:  Stop lasix.  Had some difficulty with hypoxemia post intubation.  Has 

responded to pEEP and repeat ABG is acceptable.  Will wean for sats >88% and or 

PaO2>55.  Art line placed.  Sedation.  Guarded prognosis.





1.  Agree with trial of lasix


2.  Given that mental state has improved, will hold on intubation for now and 

attempt PPV with bipap


3.  Per patient has severe anxiety, but want to wait given his recent 

improvement in mental state


4.  Abx per ID


5.  Prognosis is guarded.





CCT 31 minutes.  





Subjective


Date of service: 09/21/22


Principal diagnosis: PVD with critical limb


Interval history: 





Called by RT this am secondary to patient being on Continuous bipap at 100%.  No

ABG done last night or this am but also no order for one.  Hypoxic on 100% so 

decision made to intubate.  CXR is worse today but has had increased BUN and Cr 

and urine output did slow down.  





Objective


                               Vital Signs - 12hr











  09/21/22 09/21/22 09/21/22





  02:30 03:00 03:30


 


Temperature   


 


Pulse Rate 125 H 129 H 127 H


 


Pulse Rate [   





From Monitor]   


 


Respiratory 32 H 37 H 37 H





Rate   


 


Blood Pressure 156/51 153/56 153/51


 


O2 Sat by Pulse 94 91 93





Oximetry   














  09/21/22 09/21/22 09/21/22





  03:35 04:00 04:30


 


Temperature 100.6 F H  


 


Pulse Rate  131 H 129 H


 


Pulse Rate [  131 H 





From Monitor]   


 


Respiratory  37 H 23





Rate   


 


Blood Pressure  159/52 142/55


 


O2 Sat by Pulse  90 90





Oximetry   














  09/21/22 09/21/22 09/21/22





  04:55 05:00 05:30


 


Temperature   


 


Pulse Rate 126 H 128 H 129 H


 


Pulse Rate [   





From Monitor]   


 


Respiratory 36 H 32 H 28 H





Rate   


 


Blood Pressure  136/48 144/55


 


O2 Sat by Pulse 93 90 89





Oximetry   














  09/21/22 09/21/22 09/21/22





  06:00 06:05 06:30


 


Temperature   


 


Pulse Rate 133 H 133 H 119 H


 


Pulse Rate [   





From Monitor]   


 


Respiratory 35 H  38 H





Rate   


 


Blood Pressure 149/56 149/56 143/53


 


O2 Sat by Pulse 93  87





Oximetry   














  09/21/22 09/21/22 09/21/22





  07:00 07:30 08:00


 


Temperature   


 


Pulse Rate 124 H 125 H 127 H


 


Pulse Rate [   





From Monitor]   


 


Respiratory 36 H 39 H 37 H





Rate   


 


Blood Pressure 143/53 110/87 110/87


 


O2 Sat by Pulse 86 88 89





Oximetry   














  09/21/22 09/21/22 09/21/22





  08:18 08:30 09:00


 


Temperature   


 


Pulse Rate 122 H 122 H 124 H


 


Pulse Rate [   





From Monitor]   


 


Respiratory 45 H 36 H 38 H





Rate   


 


Blood Pressure 141/41 99/54 99/54


 


O2 Sat by Pulse 88 88 87





Oximetry   














  09/21/22 09/21/22 09/21/22





  09:24 09:30 10:00


 


Temperature   


 


Pulse Rate  115 H 106 H


 


Pulse Rate [   





From Monitor]   


 


Respiratory  32 H 19





Rate   


 


Blood Pressure  119/49 133/43


 


O2 Sat by Pulse 93 89 81 L





Oximetry   














  09/21/22 09/21/22 09/21/22





  10:30 11:00 11:30


 


Temperature   


 


Pulse Rate 113 H 106 H 111 H


 


Pulse Rate [   





From Monitor]   


 


Respiratory 24 27 H 27 H





Rate   


 


Blood Pressure 129/40 106/40 96/38


 


O2 Sat by Pulse 85 71 L 92





Oximetry   














  09/21/22 09/21/22 09/21/22





  12:00 12:30 12:33


 


Temperature   


 


Pulse Rate 110 H 113 H 111 H


 


Pulse Rate [   





From Monitor]   


 


Respiratory 30 H 30 H 





Rate   


 


Blood Pressure 107/38 104/46 104/46


 


O2 Sat by Pulse 92 95 95





Oximetry   











CBC and BMP: 


                                 09/21/22 08:07





                                 09/21/22 08:07


ABG, PT/INR, D-dimer: 


ABG











ABG pH  7.292 pH Units (7.350-7.450)  L  09/21/22  13:30    


 


ABG pCO2  42.0 mm Hg  09/21/22  13:30    


 


ABG pO2  80.6 mm Hg (80.0-90.0)   09/21/22  13:30    


 


ABG O2 Saturation  95.8 % (95.0-99.0)   09/21/22  13:30    





PT/INR, D-dimer











PT  14.5 Sec. (12.2-14.9)   09/12/22  Unknown


 


INR  0.99  (0.87-1.13)   09/12/22  Unknown








Abnormal lab findings: 


                                  Abnormal Labs











  09/10/22 09/10/22 09/10/22





  02:26 02:26 08:41


 


WBC  27.0 H  


 


RBC  2.52 L  


 


Hgb  8.2 L  


 


Hct  25.5 L  


 


MCV  101 H  


 


MCH  33 H  


 


MCHC   


 


RDW  13.1 L  


 


Plt Count  31 L  


 


Lymph % (Auto)   


 


Mono % (Auto)   


 


Lymph # (Auto)   


 


Mono # (Auto)   


 


Baso # (Auto)   


 


Seg Neutrophils %   


 


Seg Neuts % (Manual)  88.0 H  


 


Lymphocytes % (Manual)  7.0 L  


 


Monocytes % (Manual)   


 


Nucleated RBC %   


 


Seg Neutrophils #   


 


Seg Neutrophils # Man  23.8 H  


 


Lymphocytes # (Manual)   


 


Monocytes # (Manual)   


 


ABG pH   


 


ABG pO2   


 


ABG HCO3   


 


ABG O2 Saturation   


 


ABG Base Excess   


 


ABG Hemoglobin   


 


Oxyhemoglobin   


 


Sodium   129 L 


 


Potassium   


 


Chloride   91.5 L 


 


Carbon Dioxide   16 L 


 


BUN   57 H 


 


Creatinine   1.7 H 


 


Glucose   509 H* 


 


POC Glucose   


 


Hemoglobin A1c    6.5 H


 


Calcium   


 


Iron   


 


TIBC   


 


Ferritin   


 


AST   41 H 


 


Alkaline Phosphatase   


 


Total Creatine Kinase   


 


NT-Pro-B Natriuret Pep   


 


Total Protein   


 


Albumin   3.6 L 


 


LDL Cholesterol Direct   


 


HDL Cholesterol   


 


Vitamin B12   


 


Crossmatch   














  09/10/22 09/10/22 09/10/22





  08:41 09:19 11:22


 


WBC   


 


RBC   


 


Hgb   


 


Hct   


 


MCV   


 


MCH   


 


MCHC   


 


RDW   


 


Plt Count   


 


Lymph % (Auto)   


 


Mono % (Auto)   


 


Lymph # (Auto)   


 


Mono # (Auto)   


 


Baso # (Auto)   


 


Seg Neutrophils %   


 


Seg Neuts % (Manual)   


 


Lymphocytes % (Manual)   


 


Monocytes % (Manual)   


 


Nucleated RBC %   


 


Seg Neutrophils #   


 


Seg Neutrophils # Man   


 


Lymphocytes # (Manual)   


 


Monocytes # (Manual)   


 


ABG pH   


 


ABG pO2   


 


ABG HCO3   


 


ABG O2 Saturation   


 


ABG Base Excess   


 


ABG Hemoglobin   


 


Oxyhemoglobin   


 


Sodium   


 


Potassium   


 


Chloride   


 


Carbon Dioxide   


 


BUN   


 


Creatinine   


 


Glucose   


 


POC Glucose   394 H  343 H


 


Hemoglobin A1c   


 


Calcium   


 


Iron   


 


TIBC   


 


Ferritin   


 


AST   


 


Alkaline Phosphatase   


 


Total Creatine Kinase   


 


NT-Pro-B Natriuret Pep   


 


Total Protein   


 


Albumin   


 


LDL Cholesterol Direct  46 L  


 


HDL Cholesterol  37 L  


 


Vitamin B12   


 


Crossmatch   














  09/10/22 09/10/22 09/10/22





  15:52 17:44 18:16


 


WBC   


 


RBC   


 


Hgb   


 


Hct   


 


MCV   


 


MCH   


 


MCHC   


 


RDW   


 


Plt Count   


 


Lymph % (Auto)   


 


Mono % (Auto)   


 


Lymph # (Auto)   


 


Mono # (Auto)   


 


Baso # (Auto)   


 


Seg Neutrophils %   


 


Seg Neuts % (Manual)   


 


Lymphocytes % (Manual)   


 


Monocytes % (Manual)   


 


Nucleated RBC %   


 


Seg Neutrophils #   


 


Seg Neutrophils # Man   


 


Lymphocytes # (Manual)   


 


Monocytes # (Manual)   


 


ABG pH   


 


ABG pO2   


 


ABG HCO3   


 


ABG O2 Saturation   


 


ABG Base Excess   


 


ABG Hemoglobin   


 


Oxyhemoglobin   


 


Sodium   


 


Potassium   


 


Chloride   


 


Carbon Dioxide   


 


BUN   


 


Creatinine   


 


Glucose   


 


POC Glucose  515 H  558 H  464 H


 


Hemoglobin A1c   


 


Calcium   


 


Iron   


 


TIBC   


 


Ferritin   


 


AST   


 


Alkaline Phosphatase   


 


Total Creatine Kinase   


 


NT-Pro-B Natriuret Pep   


 


Total Protein   


 


Albumin   


 


LDL Cholesterol Direct   


 


HDL Cholesterol   


 


Vitamin B12   


 


Crossmatch   














  09/10/22 09/11/22 09/11/22





  20:35 05:35 05:35


 


WBC   21.2 H 


 


RBC   2.49 L 


 


Hgb   7.9 L 


 


Hct   25.3 L 


 


MCV   102 H 


 


MCH   


 


MCHC   31 L 


 


RDW   13.1 L 


 


Plt Count   27 L 


 


Lymph % (Auto)   


 


Mono % (Auto)   


 


Lymph # (Auto)   


 


Mono # (Auto)   


 


Baso # (Auto)   


 


Seg Neutrophils %   


 


Seg Neuts % (Manual)   81.5 H 


 


Lymphocytes % (Manual)   12.0 L 


 


Monocytes % (Manual)   


 


Nucleated RBC %   


 


Seg Neutrophils #   


 


Seg Neutrophils # Man   17.3 H 


 


Lymphocytes # (Manual)   


 


Monocytes # (Manual)   1.0 H 


 


ABG pH   


 


ABG pO2   


 


ABG HCO3   


 


ABG O2 Saturation   


 


ABG Base Excess   


 


ABG Hemoglobin   


 


Oxyhemoglobin   


 


Sodium    134 L


 


Potassium   


 


Chloride   


 


Carbon Dioxide    21 L


 


BUN    29 H


 


Creatinine   


 


Glucose    179 H


 


POC Glucose  359 H  


 


Hemoglobin A1c   


 


Calcium   


 


Iron    16 L


 


TIBC    133 L


 


Ferritin   


 


AST   


 


Alkaline Phosphatase   


 


Total Creatine Kinase   


 


NT-Pro-B Natriuret Pep   


 


Total Protein   


 


Albumin   


 


LDL Cholesterol Direct   


 


HDL Cholesterol   


 


Vitamin B12   


 


Crossmatch   














  09/11/22 09/11/22 09/11/22





  05:35 05:50 07:39


 


WBC   


 


RBC   


 


Hgb   


 


Hct   


 


MCV   


 


MCH   


 


MCHC   


 


RDW   


 


Plt Count   


 


Lymph % (Auto)   


 


Mono % (Auto)   


 


Lymph # (Auto)   


 


Mono # (Auto)   


 


Baso # (Auto)   


 


Seg Neutrophils %   


 


Seg Neuts % (Manual)   


 


Lymphocytes % (Manual)   


 


Monocytes % (Manual)   


 


Nucleated RBC %   


 


Seg Neutrophils #   


 


Seg Neutrophils # Man   


 


Lymphocytes # (Manual)   


 


Monocytes # (Manual)   


 


ABG pH   


 


ABG pO2   


 


ABG HCO3   


 


ABG O2 Saturation   


 


ABG Base Excess   


 


ABG Hemoglobin   


 


Oxyhemoglobin   


 


Sodium   


 


Potassium   


 


Chloride   


 


Carbon Dioxide   


 


BUN   


 


Creatinine   


 


Glucose   


 


POC Glucose   164 H  180 H


 


Hemoglobin A1c   


 


Calcium   


 


Iron   


 


TIBC   


 


Ferritin  2888.0 H  


 


AST   


 


Alkaline Phosphatase   


 


Total Creatine Kinase   


 


NT-Pro-B Natriuret Pep   


 


Total Protein   


 


Albumin   


 


LDL Cholesterol Direct   


 


HDL Cholesterol   


 


Vitamin B12   


 


Crossmatch   














  09/11/22 09/11/22 09/12/22





  11:39 16:33 06:01


 


WBC   


 


RBC   


 


Hgb   


 


Hct   


 


MCV   


 


MCH   


 


MCHC   


 


RDW   


 


Plt Count   


 


Lymph % (Auto)   


 


Mono % (Auto)   


 


Lymph # (Auto)   


 


Mono # (Auto)   


 


Baso # (Auto)   


 


Seg Neutrophils %   


 


Seg Neuts % (Manual)   


 


Lymphocytes % (Manual)   


 


Monocytes % (Manual)   


 


Nucleated RBC %   


 


Seg Neutrophils #   


 


Seg Neutrophils # Man   


 


Lymphocytes # (Manual)   


 


Monocytes # (Manual)   


 


ABG pH   


 


ABG pO2   


 


ABG HCO3   


 


ABG O2 Saturation   


 


ABG Base Excess   


 


ABG Hemoglobin   


 


Oxyhemoglobin   


 


Sodium   


 


Potassium   


 


Chloride   


 


Carbon Dioxide   


 


BUN   


 


Creatinine   


 


Glucose   


 


POC Glucose  202 H  182 H  150 H


 


Hemoglobin A1c   


 


Calcium   


 


Iron   


 


TIBC   


 


Ferritin   


 


AST   


 


Alkaline Phosphatase   


 


Total Creatine Kinase   


 


NT-Pro-B Natriuret Pep   


 


Total Protein   


 


Albumin   


 


LDL Cholesterol Direct   


 


HDL Cholesterol   


 


Vitamin B12   


 


Crossmatch   














  09/12/22 09/12/22 09/12/22





  08:50 12:09 13:02


 


WBC   22.5 H 


 


RBC   2.70 L 


 


Hgb   8.8 L 


 


Hct   27.2 L 


 


MCV   101 H 


 


MCH   33 H 


 


MCHC   


 


RDW   


 


Plt Count   44 L 


 


Lymph % (Auto)   


 


Mono % (Auto)   


 


Lymph # (Auto)   


 


Mono # (Auto)   


 


Baso # (Auto)   


 


Seg Neutrophils %   


 


Seg Neuts % (Manual)   77.0 H 


 


Lymphocytes % (Manual)   11.0 L 


 


Monocytes % (Manual)   12.0 H 


 


Nucleated RBC %   


 


Seg Neutrophils #   


 


Seg Neutrophils # Man   17.3 H 


 


Lymphocytes # (Manual)   


 


Monocytes # (Manual)   2.7 H 


 


ABG pH   


 


ABG pO2   


 


ABG HCO3   


 


ABG O2 Saturation   


 


ABG Base Excess   


 


ABG Hemoglobin   


 


Oxyhemoglobin   


 


Sodium   


 


Potassium   


 


Chloride   


 


Carbon Dioxide   


 


BUN   


 


Creatinine   


 


Glucose   


 


POC Glucose  191 H   228 H


 


Hemoglobin A1c   


 


Calcium   


 


Iron   


 


TIBC   


 


Ferritin   


 


AST   


 


Alkaline Phosphatase   


 


Total Creatine Kinase   


 


NT-Pro-B Natriuret Pep   


 


Total Protein   


 


Albumin   


 


LDL Cholesterol Direct   


 


HDL Cholesterol   


 


Vitamin B12   


 


Crossmatch   














  09/12/22 09/12/22 09/12/22





  20:41 Unknown Unknown


 


WBC   17.9 H 


 


RBC   2.71 L 


 


Hgb   8.6 L 


 


Hct   27.3 L 


 


MCV   101 H 


 


MCH   


 


MCHC   31 L 


 


RDW   13.1 L 


 


Plt Count   47 L 


 


Lymph % (Auto)   


 


Mono % (Auto)   11.9 H 


 


Lymph # (Auto)   


 


Mono # (Auto)   2.1 H 


 


Baso # (Auto)   


 


Seg Neutrophils %   70.7 H 


 


Seg Neuts % (Manual)   


 


Lymphocytes % (Manual)   


 


Monocytes % (Manual)   


 


Nucleated RBC %   


 


Seg Neutrophils #   12.6 H 


 


Seg Neutrophils # Man   


 


Lymphocytes # (Manual)   


 


Monocytes # (Manual)   


 


ABG pH   


 


ABG pO2   


 


ABG HCO3   


 


ABG O2 Saturation   


 


ABG Base Excess   


 


ABG Hemoglobin   


 


Oxyhemoglobin   


 


Sodium    136 L


 


Potassium   


 


Chloride   


 


Carbon Dioxide   


 


BUN   


 


Creatinine   


 


Glucose    174 H


 


POC Glucose  380 H  


 


Hemoglobin A1c   


 


Calcium   


 


Iron   


 


TIBC   


 


Ferritin   


 


AST   


 


Alkaline Phosphatase   


 


Total Creatine Kinase   


 


NT-Pro-B Natriuret Pep   


 


Total Protein   


 


Albumin   


 


LDL Cholesterol Direct   


 


HDL Cholesterol   


 


Vitamin B12   


 


Crossmatch   














  09/13/22 09/13/22 09/13/22





  05:10 05:10 05:49


 


WBC  25.0 H  


 


RBC  2.51 L  


 


Hgb  8.1 L  


 


Hct  24.9 L  


 


MCV  99 H  


 


MCH   


 


MCHC   


 


RDW   


 


Plt Count  41 L  


 


Lymph % (Auto)   


 


Mono % (Auto)   


 


Lymph # (Auto)   


 


Mono # (Auto)   


 


Baso # (Auto)   


 


Seg Neutrophils %   


 


Seg Neuts % (Manual)  91.0 H  


 


Lymphocytes % (Manual)  6.0 L  


 


Monocytes % (Manual)   


 


Nucleated RBC %   


 


Seg Neutrophils #   


 


Seg Neutrophils # Man  22.8 H  


 


Lymphocytes # (Manual)   


 


Monocytes # (Manual)   


 


ABG pH   


 


ABG pO2   


 


ABG HCO3   


 


ABG O2 Saturation   


 


ABG Base Excess   


 


ABG Hemoglobin   


 


Oxyhemoglobin   


 


Sodium   


 


Potassium   


 


Chloride   


 


Carbon Dioxide   


 


BUN   


 


Creatinine   


 


Glucose   197 H 


 


POC Glucose    230 H


 


Hemoglobin A1c   


 


Calcium   


 


Iron   


 


TIBC   


 


Ferritin   


 


AST   


 


Alkaline Phosphatase   


 


Total Creatine Kinase   816 H 


 


NT-Pro-B Natriuret Pep   


 


Total Protein   


 


Albumin   


 


LDL Cholesterol Direct   


 


HDL Cholesterol   


 


Vitamin B12   


 


Crossmatch   














  09/13/22 09/13/22 09/13/22





  15:31 15:33 17:52


 


WBC   


 


RBC   


 


Hgb   


 


Hct   


 


MCV   


 


MCH   


 


MCHC   


 


RDW   


 


Plt Count   


 


Lymph % (Auto)   


 


Mono % (Auto)   


 


Lymph # (Auto)   


 


Mono # (Auto)   


 


Baso # (Auto)   


 


Seg Neutrophils %   


 


Seg Neuts % (Manual)   


 


Lymphocytes % (Manual)   


 


Monocytes % (Manual)   


 


Nucleated RBC %   


 


Seg Neutrophils #   


 


Seg Neutrophils # Man   


 


Lymphocytes # (Manual)   


 


Monocytes # (Manual)   


 


ABG pH   


 


ABG pO2   


 


ABG HCO3   


 


ABG O2 Saturation   


 


ABG Base Excess   


 


ABG Hemoglobin   


 


Oxyhemoglobin   


 


Sodium   


 


Potassium   


 


Chloride   


 


Carbon Dioxide   


 


BUN   


 


Creatinine   


 


Glucose   


 


POC Glucose  319 H  335 H  334 H


 


Hemoglobin A1c   


 


Calcium   


 


Iron   


 


TIBC   


 


Ferritin   


 


AST   


 


Alkaline Phosphatase   


 


Total Creatine Kinase   


 


NT-Pro-B Natriuret Pep   


 


Total Protein   


 


Albumin   


 


LDL Cholesterol Direct   


 


HDL Cholesterol   


 


Vitamin B12   


 


Crossmatch   














  09/13/22 09/14/22 09/14/22





  20:20 06:04 06:04


 


WBC   27.9 H 


 


RBC   2.48 L 


 


Hgb   7.9 L 


 


Hct   24.7 L 


 


MCV   100 H 


 


MCH   


 


MCHC   


 


RDW   


 


Plt Count   51 L 


 


Lymph % (Auto)   


 


Mono % (Auto)   


 


Lymph # (Auto)   


 


Mono # (Auto)   


 


Baso # (Auto)   


 


Seg Neutrophils %   


 


Seg Neuts % (Manual)   82.0 H 


 


Lymphocytes % (Manual)   8.0 L 


 


Monocytes % (Manual)   10.0 H 


 


Nucleated RBC %   


 


Seg Neutrophils #   


 


Seg Neutrophils # Man   22.9 H 


 


Lymphocytes # (Manual)   


 


Monocytes # (Manual)   2.8 H 


 


ABG pH   


 


ABG pO2   


 


ABG HCO3   


 


ABG O2 Saturation   


 


ABG Base Excess   


 


ABG Hemoglobin   


 


Oxyhemoglobin   


 


Sodium   


 


Potassium   


 


Chloride   


 


Carbon Dioxide   


 


BUN   


 


Creatinine   


 


Glucose    64 L


 


POC Glucose  250 H  


 


Hemoglobin A1c   


 


Calcium    8.3 L


 


Iron   


 


TIBC   


 


Ferritin   


 


AST   


 


Alkaline Phosphatase   


 


Total Creatine Kinase    483 H


 


NT-Pro-B Natriuret Pep   


 


Total Protein   


 


Albumin   


 


LDL Cholesterol Direct   


 


HDL Cholesterol   


 


Vitamin B12   


 


Crossmatch   














  09/14/22 09/14/22 09/14/22





  08:35 13:06 16:00


 


WBC   


 


RBC   


 


Hgb   


 


Hct   


 


MCV   


 


MCH   


 


MCHC   


 


RDW   


 


Plt Count   


 


Lymph % (Auto)   


 


Mono % (Auto)   


 


Lymph # (Auto)   


 


Mono # (Auto)   


 


Baso # (Auto)   


 


Seg Neutrophils %   


 


Seg Neuts % (Manual)   


 


Lymphocytes % (Manual)   


 


Monocytes % (Manual)   


 


Nucleated RBC %   


 


Seg Neutrophils #   


 


Seg Neutrophils # Man   


 


Lymphocytes # (Manual)   


 


Monocytes # (Manual)   


 


ABG pH   


 


ABG pO2   


 


ABG HCO3   


 


ABG O2 Saturation   


 


ABG Base Excess   


 


ABG Hemoglobin   


 


Oxyhemoglobin   


 


Sodium   


 


Potassium   


 


Chloride   


 


Carbon Dioxide   


 


BUN   


 


Creatinine   


 


Glucose   


 


POC Glucose  111 H  220 H  107 H


 


Hemoglobin A1c   


 


Calcium   


 


Iron   


 


TIBC   


 


Ferritin   


 


AST   


 


Alkaline Phosphatase   


 


Total Creatine Kinase   


 


NT-Pro-B Natriuret Pep   


 


Total Protein   


 


Albumin   


 


LDL Cholesterol Direct   


 


HDL Cholesterol   


 


Vitamin B12   


 


Crossmatch   














  09/14/22 09/15/22 09/15/22





  22:37 04:51 05:15


 


WBC   


 


RBC   


 


Hgb   


 


Hct   


 


MCV   


 


MCH   


 


MCHC   


 


RDW   


 


Plt Count   


 


Lymph % (Auto)   


 


Mono % (Auto)   


 


Lymph # (Auto)   


 


Mono # (Auto)   


 


Baso # (Auto)   


 


Seg Neutrophils %   


 


Seg Neuts % (Manual)   


 


Lymphocytes % (Manual)   


 


Monocytes % (Manual)   


 


Nucleated RBC %   


 


Seg Neutrophils #   


 


Seg Neutrophils # Man   


 


Lymphocytes # (Manual)   


 


Monocytes # (Manual)   


 


ABG pH   


 


ABG pO2   


 


ABG HCO3   


 


ABG O2 Saturation   


 


ABG Base Excess   


 


ABG Hemoglobin   


 


Oxyhemoglobin   


 


Sodium   


 


Potassium   


 


Chloride   


 


Carbon Dioxide   


 


BUN   


 


Creatinine   


 


Glucose   


 


POC Glucose  109 H  189 H 


 


Hemoglobin A1c   


 


Calcium   


 


Iron   


 


TIBC   


 


Ferritin   


 


AST   


 


Alkaline Phosphatase   


 


Total Creatine Kinase    304 H


 


NT-Pro-B Natriuret Pep   


 


Total Protein   


 


Albumin   


 


LDL Cholesterol Direct   


 


HDL Cholesterol   


 


Vitamin B12   


 


Crossmatch   














  09/15/22 09/15/22 09/15/22





  07:53 11:09 14:04


 


WBC   


 


RBC   


 


Hgb   


 


Hct   


 


MCV   


 


MCH   


 


MCHC   


 


RDW   


 


Plt Count   


 


Lymph % (Auto)   


 


Mono % (Auto)   


 


Lymph # (Auto)   


 


Mono # (Auto)   


 


Baso # (Auto)   


 


Seg Neutrophils %   


 


Seg Neuts % (Manual)   


 


Lymphocytes % (Manual)   


 


Monocytes % (Manual)   


 


Nucleated RBC %   


 


Seg Neutrophils #   


 


Seg Neutrophils # Man   


 


Lymphocytes # (Manual)   


 


Monocytes # (Manual)   


 


ABG pH   


 


ABG pO2   


 


ABG HCO3   


 


ABG O2 Saturation   


 


ABG Base Excess   


 


ABG Hemoglobin   


 


Oxyhemoglobin   


 


Sodium   


 


Potassium   


 


Chloride   


 


Carbon Dioxide   


 


BUN   


 


Creatinine   


 


Glucose   


 


POC Glucose  242 H  269 H  285 H


 


Hemoglobin A1c   


 


Calcium   


 


Iron   


 


TIBC   


 


Ferritin   


 


AST   


 


Alkaline Phosphatase   


 


Total Creatine Kinase   


 


NT-Pro-B Natriuret Pep   


 


Total Protein   


 


Albumin   


 


LDL Cholesterol Direct   


 


HDL Cholesterol   


 


Vitamin B12   


 


Crossmatch   














  09/15/22 09/15/22 09/15/22





  17:11 17:50 21:18


 


WBC   


 


RBC   


 


Hgb   


 


Hct   


 


MCV   


 


MCH   


 


MCHC   


 


RDW   


 


Plt Count   


 


Lymph % (Auto)   


 


Mono % (Auto)   


 


Lymph # (Auto)   


 


Mono # (Auto)   


 


Baso # (Auto)   


 


Seg Neutrophils %   


 


Seg Neuts % (Manual)   


 


Lymphocytes % (Manual)   


 


Monocytes % (Manual)   


 


Nucleated RBC %   


 


Seg Neutrophils #   


 


Seg Neutrophils # Man   


 


Lymphocytes # (Manual)   


 


Monocytes # (Manual)   


 


ABG pH   


 


ABG pO2   


 


ABG HCO3   


 


ABG O2 Saturation   


 


ABG Base Excess   


 


ABG Hemoglobin   


 


Oxyhemoglobin   


 


Sodium   


 


Potassium   


 


Chloride   


 


Carbon Dioxide   


 


BUN   


 


Creatinine   


 


Glucose   


 


POC Glucose  301 H  299 H  331 H


 


Hemoglobin A1c   


 


Calcium   


 


Iron   


 


TIBC   


 


Ferritin   


 


AST   


 


Alkaline Phosphatase   


 


Total Creatine Kinase   


 


NT-Pro-B Natriuret Pep   


 


Total Protein   


 


Albumin   


 


LDL Cholesterol Direct   


 


HDL Cholesterol   


 


Vitamin B12   


 


Crossmatch   














  09/16/22 09/16/22 09/16/22





  07:02 08:10 11:37


 


WBC   


 


RBC   


 


Hgb   


 


Hct   


 


MCV   


 


MCH   


 


MCHC   


 


RDW   


 


Plt Count   


 


Lymph % (Auto)   


 


Mono % (Auto)   


 


Lymph # (Auto)   


 


Mono # (Auto)   


 


Baso # (Auto)   


 


Seg Neutrophils %   


 


Seg Neuts % (Manual)   


 


Lymphocytes % (Manual)   


 


Monocytes % (Manual)   


 


Nucleated RBC %   


 


Seg Neutrophils #   


 


Seg Neutrophils # Man   


 


Lymphocytes # (Manual)   


 


Monocytes # (Manual)   


 


ABG pH   


 


ABG pO2   


 


ABG HCO3   


 


ABG O2 Saturation   


 


ABG Base Excess   


 


ABG Hemoglobin   


 


Oxyhemoglobin   


 


Sodium   


 


Potassium   


 


Chloride   


 


Carbon Dioxide   


 


BUN   


 


Creatinine   


 


Glucose   


 


POC Glucose  134 H  158 H  226 H


 


Hemoglobin A1c   


 


Calcium   


 


Iron   


 


TIBC   


 


Ferritin   


 


AST   


 


Alkaline Phosphatase   


 


Total Creatine Kinase   


 


NT-Pro-B Natriuret Pep   


 


Total Protein   


 


Albumin   


 


LDL Cholesterol Direct   


 


HDL Cholesterol   


 


Vitamin B12   


 


Crossmatch   














  09/16/22 09/16/22 09/16/22





  17:12 20:17 23:34


 


WBC   


 


RBC   


 


Hgb   


 


Hct   


 


MCV   


 


MCH   


 


MCHC   


 


RDW   


 


Plt Count   


 


Lymph % (Auto)   


 


Mono % (Auto)   


 


Lymph # (Auto)   


 


Mono # (Auto)   


 


Baso # (Auto)   


 


Seg Neutrophils %   


 


Seg Neuts % (Manual)   


 


Lymphocytes % (Manual)   


 


Monocytes % (Manual)   


 


Nucleated RBC %   


 


Seg Neutrophils #   


 


Seg Neutrophils # Man   


 


Lymphocytes # (Manual)   


 


Monocytes # (Manual)   


 


ABG pH   


 


ABG pO2   


 


ABG HCO3   


 


ABG O2 Saturation   


 


ABG Base Excess   


 


ABG Hemoglobin   


 


Oxyhemoglobin   


 


Sodium   


 


Potassium   


 


Chloride   


 


Carbon Dioxide   


 


BUN   


 


Creatinine   


 


Glucose   


 


POC Glucose  122 H  235 H  283 H


 


Hemoglobin A1c   


 


Calcium   


 


Iron   


 


TIBC   


 


Ferritin   


 


AST   


 


Alkaline Phosphatase   


 


Total Creatine Kinase   


 


NT-Pro-B Natriuret Pep   


 


Total Protein   


 


Albumin   


 


LDL Cholesterol Direct   


 


HDL Cholesterol   


 


Vitamin B12   


 


Crossmatch   














  09/17/22 09/17/22 09/17/22





  06:01 07:58 07:58


 


WBC    29.4 H


 


RBC    2.11 L


 


Hgb    6.7 L


 


Hct    21.0 L


 


MCV    100 H


 


MCH   


 


MCHC   


 


RDW   


 


Plt Count    34 L


 


Lymph % (Auto)   


 


Mono % (Auto)   


 


Lymph # (Auto)   


 


Mono # (Auto)   


 


Baso # (Auto)   


 


Seg Neutrophils %   


 


Seg Neuts % (Manual)    86.0 H


 


Lymphocytes % (Manual)    4.0 L


 


Monocytes % (Manual)    10.0 H


 


Nucleated RBC %   


 


Seg Neutrophils #   


 


Seg Neutrophils # Man    25.3 H


 


Lymphocytes # (Manual)   


 


Monocytes # (Manual)    2.9 H


 


ABG pH   


 


ABG pO2   


 


ABG HCO3   


 


ABG O2 Saturation   


 


ABG Base Excess   


 


ABG Hemoglobin   


 


Oxyhemoglobin   


 


Sodium   


 


Potassium   


 


Chloride   


 


Carbon Dioxide   


 


BUN   


 


Creatinine   


 


Glucose   


 


POC Glucose  175 H  


 


Hemoglobin A1c   


 


Calcium   


 


Iron   


 


TIBC   


 


Ferritin   


 


AST   


 


Alkaline Phosphatase   


 


Total Creatine Kinase   


 


NT-Pro-B Natriuret Pep   


 


Total Protein   


 


Albumin   


 


LDL Cholesterol Direct   


 


HDL Cholesterol   


 


Vitamin B12   > 2000 H 


 


Crossmatch   














  09/17/22 09/17/22 09/17/22





  07:58 08:11 10:51


 


WBC   


 


RBC   


 


Hgb   


 


Hct   


 


MCV   


 


MCH   


 


MCHC   


 


RDW   


 


Plt Count   


 


Lymph % (Auto)   


 


Mono % (Auto)   


 


Lymph # (Auto)   


 


Mono # (Auto)   


 


Baso # (Auto)   


 


Seg Neutrophils %   


 


Seg Neuts % (Manual)   


 


Lymphocytes % (Manual)   


 


Monocytes % (Manual)   


 


Nucleated RBC %   


 


Seg Neutrophils #   


 


Seg Neutrophils # Man   


 


Lymphocytes # (Manual)   


 


Monocytes # (Manual)   


 


ABG pH   


 


ABG pO2   


 


ABG HCO3   


 


ABG O2 Saturation   


 


ABG Base Excess   


 


ABG Hemoglobin   


 


Oxyhemoglobin   


 


Sodium  136 L  


 


Potassium  2.9 L* D  


 


Chloride   


 


Carbon Dioxide   


 


BUN   


 


Creatinine   


 


Glucose  217 H  


 


POC Glucose   218 H  222 H


 


Hemoglobin A1c   


 


Calcium  7.5 L  


 


Iron   


 


TIBC   


 


Ferritin   


 


AST   


 


Alkaline Phosphatase   


 


Total Creatine Kinase   


 


NT-Pro-B Natriuret Pep   


 


Total Protein   


 


Albumin   


 


LDL Cholesterol Direct   


 


HDL Cholesterol   


 


Vitamin B12   


 


Crossmatch   














  09/17/22 09/17/22 09/18/22





  15:25 20:32 08:22


 


WBC   


 


RBC   


 


Hgb   


 


Hct   


 


MCV   


 


MCH   


 


MCHC   


 


RDW   


 


Plt Count   


 


Lymph % (Auto)   


 


Mono % (Auto)   


 


Lymph # (Auto)   


 


Mono # (Auto)   


 


Baso # (Auto)   


 


Seg Neutrophils %   


 


Seg Neuts % (Manual)   


 


Lymphocytes % (Manual)   


 


Monocytes % (Manual)   


 


Nucleated RBC %   


 


Seg Neutrophils #   


 


Seg Neutrophils # Man   


 


Lymphocytes # (Manual)   


 


Monocytes # (Manual)   


 


ABG pH   


 


ABG pO2   


 


ABG HCO3   


 


ABG O2 Saturation   


 


ABG Base Excess   


 


ABG Hemoglobin   


 


Oxyhemoglobin   


 


Sodium   


 


Potassium    3.5 L D


 


Chloride   


 


Carbon Dioxide   


 


BUN   


 


Creatinine   


 


Glucose    30 L*


 


POC Glucose  174 H  159 H 


 


Hemoglobin A1c   


 


Calcium    7.2 L


 


Iron   


 


TIBC   


 


Ferritin   


 


AST   


 


Alkaline Phosphatase   


 


Total Creatine Kinase   


 


NT-Pro-B Natriuret Pep   


 


Total Protein   


 


Albumin   


 


LDL Cholesterol Direct   


 


HDL Cholesterol   


 


Vitamin B12   


 


Crossmatch   














  09/18/22 09/18/22 09/18/22





  11:23 11:56 16:00


 


WBC   


 


RBC   


 


Hgb   


 


Hct   


 


MCV   


 


MCH   


 


MCHC   


 


RDW   


 


Plt Count   


 


Lymph % (Auto)   


 


Mono % (Auto)   


 


Lymph # (Auto)   


 


Mono # (Auto)   


 


Baso # (Auto)   


 


Seg Neutrophils %   


 


Seg Neuts % (Manual)   


 


Lymphocytes % (Manual)   


 


Monocytes % (Manual)   


 


Nucleated RBC %   


 


Seg Neutrophils #   


 


Seg Neutrophils # Man   


 


Lymphocytes # (Manual)   


 


Monocytes # (Manual)   


 


ABG pH   7.528 H 


 


ABG pO2   125.0 H 


 


ABG HCO3   


 


ABG O2 Saturation   


 


ABG Base Excess   


 


ABG Hemoglobin   6.9 L 


 


Oxyhemoglobin   


 


Sodium   


 


Potassium   


 


Chloride   


 


Carbon Dioxide   


 


BUN   


 


Creatinine   


 


Glucose   


 


POC Glucose  117 H   43 L


 


Hemoglobin A1c   


 


Calcium   


 


Iron   


 


TIBC   


 


Ferritin   


 


AST   


 


Alkaline Phosphatase   


 


Total Creatine Kinase   


 


NT-Pro-B Natriuret Pep   


 


Total Protein   


 


Albumin   


 


LDL Cholesterol Direct   


 


HDL Cholesterol   


 


Vitamin B12   


 


Crossmatch   














  09/18/22 09/18/22 09/18/22





  16:19 16:54 20:44


 


WBC   


 


RBC   


 


Hgb   


 


Hct   


 


MCV   


 


MCH   


 


MCHC   


 


RDW   


 


Plt Count   


 


Lymph % (Auto)   


 


Mono % (Auto)   


 


Lymph # (Auto)   


 


Mono # (Auto)   


 


Baso # (Auto)   


 


Seg Neutrophils %   


 


Seg Neuts % (Manual)   


 


Lymphocytes % (Manual)   


 


Monocytes % (Manual)   


 


Nucleated RBC %   


 


Seg Neutrophils #   


 


Seg Neutrophils # Man   


 


Lymphocytes # (Manual)   


 


Monocytes # (Manual)   


 


ABG pH   


 


ABG pO2   


 


ABG HCO3   


 


ABG O2 Saturation   


 


ABG Base Excess   


 


ABG Hemoglobin   


 


Oxyhemoglobin   


 


Sodium   


 


Potassium   


 


Chloride   


 


Carbon Dioxide   


 


BUN   


 


Creatinine   


 


Glucose   


 


POC Glucose   150 H  188 H


 


Hemoglobin A1c   


 


Calcium   


 


Iron   


 


TIBC   


 


Ferritin   


 


AST   


 


Alkaline Phosphatase   


 


Total Creatine Kinase   


 


NT-Pro-B Natriuret Pep   


 


Total Protein   


 


Albumin   


 


LDL Cholesterol Direct   


 


HDL Cholesterol   


 


Vitamin B12   


 


Crossmatch  See Detail  














  09/19/22 09/19/22 09/19/22





  07:37 08:31 08:31


 


WBC   33.1 H 


 


RBC   2.58 L 


 


Hgb   7.8 L 


 


Hct   25.1 L 


 


MCV   97 H 


 


MCH   


 


MCHC   31 L 


 


RDW   16.8 H 


 


Plt Count   14 L* 


 


Lymph % (Auto)   


 


Mono % (Auto)   


 


Lymph # (Auto)   1.1 L 


 


Mono # (Auto)   1.8 H 


 


Baso # (Auto)   0.2 H 


 


Seg Neutrophils %   89.3 H 


 


Seg Neuts % (Manual)   


 


Lymphocytes % (Manual)   


 


Monocytes % (Manual)   


 


Nucleated RBC %   


 


Seg Neutrophils #   28.0 H 


 


Seg Neutrophils # Man   


 


Lymphocytes # (Manual)   


 


Monocytes # (Manual)   


 


ABG pH   


 


ABG pO2   


 


ABG HCO3   


 


ABG O2 Saturation   


 


ABG Base Excess   


 


ABG Hemoglobin   


 


Oxyhemoglobin   


 


Sodium    136 L


 


Potassium   


 


Chloride   


 


Carbon Dioxide    19 L


 


BUN   


 


Creatinine   


 


Glucose    374 H


 


POC Glucose  283 H  


 


Hemoglobin A1c   


 


Calcium    7.3 L


 


Iron   


 


TIBC   


 


Ferritin   


 


AST   


 


Alkaline Phosphatase   


 


Total Creatine Kinase   


 


NT-Pro-B Natriuret Pep   


 


Total Protein   


 


Albumin   


 


LDL Cholesterol Direct   


 


HDL Cholesterol   


 


Vitamin B12   


 


Crossmatch   














  09/19/22 09/19/22 09/19/22





  09:50 11:31 16:13


 


WBC   


 


RBC   


 


Hgb   


 


Hct   


 


MCV   


 


MCH   


 


MCHC   


 


RDW   


 


Plt Count   


 


Lymph % (Auto)   


 


Mono % (Auto)   


 


Lymph # (Auto)   


 


Mono # (Auto)   


 


Baso # (Auto)   


 


Seg Neutrophils %   


 


Seg Neuts % (Manual)   


 


Lymphocytes % (Manual)   


 


Monocytes % (Manual)   


 


Nucleated RBC %   


 


Seg Neutrophils #   


 


Seg Neutrophils # Man   


 


Lymphocytes # (Manual)   


 


Monocytes # (Manual)   


 


ABG pH   


 


ABG pO2   


 


ABG HCO3   


 


ABG O2 Saturation   


 


ABG Base Excess   


 


ABG Hemoglobin   


 


Oxyhemoglobin   


 


Sodium   


 


Potassium   


 


Chloride   


 


Carbon Dioxide   


 


BUN   


 


Creatinine   


 


Glucose   


 


POC Glucose   310 H  229 H


 


Hemoglobin A1c   


 


Calcium   


 


Iron   


 


TIBC   


 


Ferritin   


 


AST   


 


Alkaline Phosphatase   


 


Total Creatine Kinase   


 


NT-Pro-B Natriuret Pep  3313 H  


 


Total Protein   


 


Albumin   


 


LDL Cholesterol Direct   


 


HDL Cholesterol   


 


Vitamin B12   


 


Crossmatch   














  09/19/22 09/20/22 09/20/22





  21:50 08:24 09:33


 


WBC   


 


RBC   


 


Hgb   


 


Hct   


 


MCV   


 


MCH   


 


MCHC   


 


RDW   


 


Plt Count   


 


Lymph % (Auto)   


 


Mono % (Auto)   


 


Lymph # (Auto)   


 


Mono # (Auto)   


 


Baso # (Auto)   


 


Seg Neutrophils %   


 


Seg Neuts % (Manual)   


 


Lymphocytes % (Manual)   


 


Monocytes % (Manual)   


 


Nucleated RBC %   


 


Seg Neutrophils #   


 


Seg Neutrophils # Man   


 


Lymphocytes # (Manual)   


 


Monocytes # (Manual)   


 


ABG pH   


 


ABG pO2   


 


ABG HCO3   


 


ABG O2 Saturation   


 


ABG Base Excess   


 


ABG Hemoglobin   


 


Oxyhemoglobin   


 


Sodium   


 


Potassium   


 


Chloride   


 


Carbon Dioxide   


 


BUN   


 


Creatinine   


 


Glucose   


 


POC Glucose  181 H  200 H  230 H


 


Hemoglobin A1c   


 


Calcium   


 


Iron   


 


TIBC   


 


Ferritin   


 


AST   


 


Alkaline Phosphatase   


 


Total Creatine Kinase   


 


NT-Pro-B Natriuret Pep   


 


Total Protein   


 


Albumin   


 


LDL Cholesterol Direct   


 


HDL Cholesterol   


 


Vitamin B12   


 


Crossmatch   














  09/20/22 09/20/22 09/20/22





  12:02 15:03 15:03


 


WBC   35.1 H 


 


RBC   2.32 L 


 


Hgb   7.1 L 


 


Hct   22.4 L 


 


MCV   97 H 


 


MCH   


 


MCHC   


 


RDW   17.3 H 


 


Plt Count   30 L D 


 


Lymph % (Auto)   


 


Mono % (Auto)   


 


Lymph # (Auto)   


 


Mono # (Auto)   


 


Baso # (Auto)   


 


Seg Neutrophils %   


 


Seg Neuts % (Manual)   92.0 H 


 


Lymphocytes % (Manual)   2.0 L 


 


Monocytes % (Manual)   


 


Nucleated RBC %   2.0 H 


 


Seg Neutrophils #   


 


Seg Neutrophils # Man   32.3 H 


 


Lymphocytes # (Manual)   0.7 L 


 


Monocytes # (Manual)   1.1 H 


 


ABG pH   


 


ABG pO2   


 


ABG HCO3   


 


ABG O2 Saturation   


 


ABG Base Excess   


 


ABG Hemoglobin   


 


Oxyhemoglobin   


 


Sodium   


 


Potassium    3.2 L


 


Chloride   


 


Carbon Dioxide    20 L


 


BUN    30 H


 


Creatinine    1.5 H


 


Glucose    377 H


 


POC Glucose  325 H  


 


Hemoglobin A1c   


 


Calcium    7.4 L


 


Iron   


 


TIBC   


 


Ferritin   


 


AST   


 


Alkaline Phosphatase    180 H


 


Total Creatine Kinase   


 


NT-Pro-B Natriuret Pep   


 


Total Protein    5.3 L


 


Albumin    2.3 L


 


LDL Cholesterol Direct   


 


HDL Cholesterol   


 


Vitamin B12   


 


Crossmatch   














  09/20/22 09/20/22 09/21/22





  16:20 21:23 08:07


 


WBC    30.3 H


 


RBC    2.41 L


 


Hgb    7.5 L


 


Hct    22.7 L


 


MCV   


 


MCH   


 


MCHC   


 


RDW    17.3 H


 


Plt Count    22 L


 


Lymph % (Auto)    8.6 L


 


Mono % (Auto)   


 


Lymph # (Auto)   


 


Mono # (Auto)   


 


Baso # (Auto)    0.2 H


 


Seg Neutrophils %    87.6 H


 


Seg Neuts % (Manual)   


 


Lymphocytes % (Manual)   


 


Monocytes % (Manual)   


 


Nucleated RBC %   


 


Seg Neutrophils #    26.5 H


 


Seg Neutrophils # Man   


 


Lymphocytes # (Manual)   


 


Monocytes # (Manual)   


 


ABG pH   


 


ABG pO2   


 


ABG HCO3   


 


ABG O2 Saturation   


 


ABG Base Excess   


 


ABG Hemoglobin   


 


Oxyhemoglobin   


 


Sodium   


 


Potassium   


 


Chloride   


 


Carbon Dioxide   


 


BUN   


 


Creatinine   


 


Glucose   


 


POC Glucose  356 H  305 H 


 


Hemoglobin A1c   


 


Calcium   


 


Iron   


 


TIBC   


 


Ferritin   


 


AST   


 


Alkaline Phosphatase   


 


Total Creatine Kinase   


 


NT-Pro-B Natriuret Pep   


 


Total Protein   


 


Albumin   


 


LDL Cholesterol Direct   


 


HDL Cholesterol   


 


Vitamin B12   


 


Crossmatch   














  09/21/22 09/21/22 09/21/22





  08:07 08:20 11:10


 


WBC   


 


RBC   


 


Hgb   


 


Hct   


 


MCV   


 


MCH   


 


MCHC   


 


RDW   


 


Plt Count   


 


Lymph % (Auto)   


 


Mono % (Auto)   


 


Lymph # (Auto)   


 


Mono # (Auto)   


 


Baso # (Auto)   


 


Seg Neutrophils %   


 


Seg Neuts % (Manual)   


 


Lymphocytes % (Manual)   


 


Monocytes % (Manual)   


 


Nucleated RBC %   


 


Seg Neutrophils #   


 


Seg Neutrophils # Man   


 


Lymphocytes # (Manual)   


 


Monocytes # (Manual)   


 


ABG pH    7.270 L


 


ABG pO2   46.4 L  54.4 L


 


ABG HCO3    19.5 L


 


ABG O2 Saturation   78.6 L  78.1 L


 


ABG Base Excess    -6.9 L


 


ABG Hemoglobin   7.7 L  7.6 L


 


Oxyhemoglobin   76.7 L  76.3 L


 


Sodium   


 


Potassium  3.1 L  


 


Chloride   


 


Carbon Dioxide   


 


BUN  35 H  


 


Creatinine  1.7 H  


 


Glucose  165 H  


 


POC Glucose   


 


Hemoglobin A1c   


 


Calcium  7.5 L  


 


Iron   


 


TIBC   


 


Ferritin   


 


AST   


 


Alkaline Phosphatase  191 H  


 


Total Creatine Kinase   


 


NT-Pro-B Natriuret Pep   


 


Total Protein  5.3 L  


 


Albumin  2.0 L  


 


LDL Cholesterol Direct   


 


HDL Cholesterol   


 


Vitamin B12   


 


Crossmatch   














  09/21/22 09/21/22





  11:12 13:30


 


WBC  


 


RBC  


 


Hgb  


 


Hct  


 


MCV  


 


MCH  


 


MCHC  


 


RDW  


 


Plt Count  


 


Lymph % (Auto)  


 


Mono % (Auto)  


 


Lymph # (Auto)  


 


Mono # (Auto)  


 


Baso # (Auto)  


 


Seg Neutrophils %  


 


Seg Neuts % (Manual)  


 


Lymphocytes % (Manual)  


 


Monocytes % (Manual)  


 


Nucleated RBC %  


 


Seg Neutrophils #  


 


Seg Neutrophils # Man  


 


Lymphocytes # (Manual)  


 


Monocytes # (Manual)  


 


ABG pH   7.292 L


 


ABG pO2  


 


ABG HCO3   19.8 L


 


ABG O2 Saturation  


 


ABG Base Excess   -6.2 L


 


ABG Hemoglobin   7.0 L


 


Oxyhemoglobin   93.8 L


 


Sodium  


 


Potassium  


 


Chloride  


 


Carbon Dioxide  


 


BUN  


 


Creatinine  


 


Glucose  


 


POC Glucose  193 H 


 


Hemoglobin A1c  


 


Calcium  


 


Iron  


 


TIBC  


 


Ferritin  


 


AST  


 


Alkaline Phosphatase  


 


Total Creatine Kinase  


 


NT-Pro-B Natriuret Pep  


 


Total Protein  


 


Albumin  


 


LDL Cholesterol Direct  


 


HDL Cholesterol  


 


Vitamin B12  


 


Crossmatch

## 2022-09-21 NOTE — PROCEDURE NOTE
Date of procedure: 09/21/22


Pre-op diagnosis: acute hypoxic resp failure, gangrene s/p amputation


Post-op diagnosis: same


Procedure: 


Jericho test completed. Ulnar pulse intact.  





right radial deonna inserted using sterile technique.  


Area prepped with chlorhexidine and the site was infiltrated with 1ml 1% 

lidocaine.  





Arterial line was placed using ultrasound; catheter advanced without difficulty.

 


Line was sutured, biopatch not available and tegaderm dressing applied. RN asked

to place arm board


  


Arterial waveform observed on bedside monitor. Line flushed and zeroed.


Pt tolerated procedure well. 


VS remained stable throughout procedure.  





(time spent placing line not included in daily critical care time) 





CCT: 60 mins


 





Anesthesia: none


Surgeon: RICKY MOONEY


Condition: critical

## 2022-09-21 NOTE — PROGRESS NOTE
Assessment and Plan


Assessment and plan: 





This is a 67-year-old male with DM, PVD s/p right lower extremity bypass for 

occluded popliteal to dorsalis pedis, s/p stents metatarsal rotation of the 

right foot, s/p left lower extremity SFA/popliteal stenting, CKD admitted with 

osteomyelitis and gangrene of his foot, acute on chronic kidney injury, now with

acute hypoxic respiratory failure. 





Neuro: Acute metabolic encephalopathy


-Sedated with fentanyl and propofol


-RASS goal 0 to -1


-Reorientation as needed


-Maintain sleep-wake cycle


-As needed analgesia


-Bilateral wrist restraints for safety





Cardiac: Hypotension, h/o hypertension, CAD


-Cardiology consulted, appreciate recommendations


-Blood pressure monitoring via A-line


-Vasopressor support with Levophed


   -MAP goal greater than 65


-Echocardiogram shows EF of 55 to 60%, RVSP 37 mmHg


-Lipitor





Vascular: PVD s/p revascularization followed by amputation


-s/p  revascularization on 9/12/2022, followed by toe amputation 9/13/2022, MRI 

with abscess between the third and fourth toe also extensive bony destruction of

the third fourth fifth and even the second toe. S/p TMA of second through fifth 

toes on 9/15/2022


-General surgery, vascular surgery consulted, appreciate recommendations


-Wound care per nursing


-Plavix, Pletal





Respiratory: Acute hypoxemic respiratory failure


-[facility] consulted, appreciate recommendations


-S/p BiPAP


-Intubated on 9/21 with a 8.00 ETT at 20 at the lips


-Current vent settings: Assist-control, tidal volume 350 PEEP 18, rate 30, FiO2 

100%


   -See RT notes for titration


-A.m. ABG and CXR noted


-VAP bundle


-SPO2 monitoring





GI: Moderate protein calorie malnutrition


-24 hours -368 mL


-PPI


-NTR consulted for tube feedings


-BR: Senokot S





: Acute on possible chronic kidney disease


-Monitor intake and output


-Renally dose medications


-Avoid nephrotoxic medications


-DC Lasix


-Trend BMP





ID: Sepsis secondary to osteomyelitis of left foot, wet gangrene of the left 

third distal and middle phalanx, diabetic foot infection with abscess of left 

third digit, r/o c diff


-Infectious disease consulted, appreciate recommendations


-MRI with abscess between the third and fourth toe also extensive bony 

destruction of the third fourth fifth and even the second toe. 


-Antibiotic therapy with Daptomycin, Meropenem, Flagyl, PO Vanco


-Contact precautions


-Cdiff PCR pending


-f/u blood culture, surgical culture


-Monitor WBC and temperature curve





Endo: h/o DM


-Hemoglobin A1c 6.5


-Avoid hypoglycemia


-SSI


-Accu-Cheks q. 6


-Long-acting insulin, titrate as needed





Heme: Microcytic anemia secondary to liver dysfunction and sepsis, 

thrombocytopenia secondary to sepsis/diabetic foot infection/osteomyelitis, 

Leukocytosis


-Hematology/oncology consulted, appreciate recommendations


   -Transfuse platelets whenever platelets less than 20, transfuse 1 unit PRBC 

whenever hematocrit less than 23


-HIT negative


-S/p 1 unit PRBC and 3 units of platelets


-IV Ferrlicet while inpatient


-Trend CBC


-Transfuse hemoglobin less than 7


-SCDs to BLE while in bed














The high probability of a clinically significant, sudden or life threatening 

deterioration of the [multi] system(s) required my full and direct attention, 

intervention and personal management. The aggregate critical care time was [60] 

minutes. This time is in addition to time spent performing reported procedures 

but includes the following: 





[x] Data Review and interpretation 





[x] Patient assessment and monitoring of vital signs 





[x] Documentation 





[x] Medication orders and management


Disposition Plan: icu


Total Time Spent with Patient (Minutes): 60





History


Interval history: 


This is a 67-year-old male with DM, PVD s/p right lower extremity bypass for 

occluded popliteal to dorsalis pedis, s/p transmetatarsal amputation of right 

foot, s/p left lower extremity SFA/popliteal stenting, CKD who presented to 

emergency department on 9/10 with complaints of infected diabetic foot.  

Recommend the emergency department revealed white gangrene of left third distal 

phalanx and middle phalanx, diabetic foot infection with ulceration of the left 

third digit, osteomyelitis of left foot, hyperglycemia, pseudohyponatremia, 

metabolic acidosis, leukocytosis, thrombocytopenia and acute kidney injury.  P

atient was admitted to the hospitalist service with consults to vascular and 

general surgery.





ICU course to date:





9/20: Patient was hypoxic requiring supplemental oxygen 15 L/high flow, BiPAP as

 needed suddenly went into acute respiratory failure, and unresponsiveness, code

 met was called, Upon responding to code met, patient was severely hypoxemic on 

BiPAP respiratory therapist recommended intubation and ventilatory support. On 

auscultation bilateral basal crackles, advised 40 mg of IV Lasix x1 dose.  I 

discussed with charge nurse of ICU and pulmonary critical Dr. Marmolejo. To 

transfer the patient to ICU for possible intubation and further management.  I 

also requested critical care consult.. On arrival to ICU, patient was more 

alert, slightly improved, intubation held and pulmonary critical recommended 

BiPAP, Set of labs, chest x-ray, EKG and ABG was requested.





9/21: This morning patient was responsive and ABG was ran which showed hypoxemia

 and patient was intubated.  Patient also received A-line.  Due to multiple 

antibiotics and potential need for vasopressors PICC team was consulted.  

Patient was hypotensive for which 1 L bolus was given with minimal response and 

patient was ultimately placed on vasopressors.  Family updated by nurse 

practitioner and case management.











Hospitalist Physical





- Constitutional


Vitals: 


                                        











Temp Pulse Resp BP Pulse Ox


 


 100.6 F H  109 H  22   89/42   100 


 


 09/21/22 03:35  09/21/22 15:30  09/21/22 15:30  09/21/22 15:30  09/21/22 15:30











General appearance: Present: mild distress, well-nourished, other (On BiPAP)





- EENT


Eyes: Present: PERRL, EOM intact


ENT: dentition normal





- Neck


Neck: Present: normal ROM





- Respiratory


Respiratory effort: labored


Respiratory: bilateral: diminished





- Cardiovascular


Rhythm: regular


Heart Sounds: Present: S1 & S2.  Absent: systolic murmur, diastolic murmur





- Extremities


Extremities: no ischemia, No edema, normal temperature, normal color


Peripheral Pulses: abnormal





- Peripheral pulses


  ** dorsalis pedis


Pulse Strength: 1+ (Doppler)





  ** posterial tibial


Pulse Strength: 1+ (Doppler)





- Abdominal


General gastrointestinal: soft, non-tender, normal bowel sounds





- Integumentary


Integumentary: Present: warm, dry





- Psychiatric


Psychiatric: other





- Neurologic


Neurologic: other (responds to painful stimuli, PERRL, intact cough/gag)





- Allied Health


Allied health notes reviewed: nursing, RT, social work





Results





- Labs


CBC & Chem 7: 


                                 09/21/22 08:07





                                 09/21/22 08:07


Labs: 


                             Laboratory Last Values











WBC  30.3 K/mm3 (4.5-11.0)  H  09/21/22  08:07    


 


RBC  2.41 M/mm3 (3.65-5.03)  L  09/21/22  08:07    


 


Hgb  7.5 gm/dl (11.8-15.2)  L  09/21/22  08:07    


 


Hct  22.7 % (35.5-45.6)  L  09/21/22  08:07    


 


MCV  94 fl (84-94)   09/21/22  08:07    


 


MCH  31 pg (28-32)   09/21/22  08:07    


 


MCHC  33 % (32-34)   09/21/22  08:07    


 


RDW  17.3 % (13.2-15.2)  H  09/21/22  08:07    


 


Plt Count  22 K/mm3 (140-440)  L  09/21/22  08:07    


 


Lymph % (Auto)  8.6 % (13.4-35.0)  L  09/21/22  08:07    


 


Mono % (Auto)  2.8 % (0.0-7.3)   09/21/22  08:07    


 


Eos % (Auto)  0.2 % (0.0-4.3)   09/21/22  08:07    


 


Baso % (Auto)  0.8 % (0.0-1.8)   09/21/22  08:07    


 


Lymph # (Auto)  2.6 K/mm3 (1.2-5.4)   09/21/22  08:07    


 


Mono # (Auto)  0.8 K/mm3 (0.0-0.8)   09/21/22  08:07    


 


Eos # (Auto)  0.1 K/mm3 (0.0-0.4)   09/21/22  08:07    


 


Baso # (Auto)  0.2 K/mm3 (0.0-0.1)  H  09/21/22  08:07    


 


Add Manual Diff  Complete   09/21/22  08:07    


 


Total Counted  Cancelled   09/21/22  08:07    


 


Seg Neutrophils %  87.6 % (40.0-70.0)  H  09/21/22  08:07    


 


Seg Neuts % (Manual)  Cancelled   09/21/22  08:07    


 


Band Neutrophils %  Cancelled   09/21/22  08:07    


 


Lymphocytes % (Manual)  Cancelled   09/21/22  08:07    


 


Reactive Lymphs % (Man)  Cancelled   09/21/22  08:07    


 


Monocytes % (Manual)  Cancelled   09/21/22  08:07    


 


Eosinophils % (Manual)  Cancelled   09/21/22  08:07    


 


Basophils % (Manual)  Cancelled   09/21/22  08:07    


 


Metamyelocytes %  Cancelled   09/21/22  08:07    


 


Myelocytes %  Cancelled   09/21/22  08:07    


 


Promyelocytes %  Cancelled   09/21/22  08:07    


 


Blast Cells %  Cancelled   09/21/22  08:07    


 


Nucleated RBC %  Cancelled   09/21/22  08:07    


 


Seg Neutrophils #  26.5 K/mm3 (1.8-7.7)  H  09/21/22  08:07    


 


Seg Neutrophils # Man  Cancelled   09/21/22  08:07    


 


Band Neutrophils #  Cancelled   09/21/22  08:07    


 


Lymphocytes # (Manual)  Cancelled   09/21/22  08:07    


 


Abs React Lymphs (Man)  Cancelled   09/21/22  08:07    


 


Monocytes # (Manual)  Cancelled   09/21/22  08:07    


 


Eosinophils # (Manual)  Cancelled   09/21/22  08:07    


 


Basophils # (Manual)  Cancelled   09/21/22  08:07    


 


Metamyelocytes #  Cancelled   09/21/22  08:07    


 


Myelocytes #  Cancelled   09/21/22  08:07    


 


Promyelocytes #  Cancelled   09/21/22  08:07    


 


Blast Cells #  Cancelled   09/21/22  08:07    


 


WBC Morphology  Cancelled   09/21/22  08:07    


 


Hypersegmented Neuts  Cancelled   09/21/22  08:07    


 


Hyposegmented Neuts  Cancelled   09/21/22  08:07    


 


Hypogranular Neuts  Cancelled   09/21/22  08:07    


 


Hypersegmented Polys  Cancelled   09/21/22  08:07    


 


Smudge Cells  Cancelled   09/21/22  08:07    


 


Toxic Granulation  Cancelled   09/21/22  08:07    


 


Toxic Vacuolation  Cancelled   09/21/22  08:07    


 


Dohle Bodies  Cancelled   09/21/22  08:07    


 


Pelger-Huet Anomaly  Cancelled   09/21/22  08:07    


 


Olive Rods  Cancelled   09/21/22  08:07    


 


Platelet Estimate  Cancelled   09/21/22  08:07    


 


Clumped Platelets  Cancelled   09/21/22  08:07    


 


Plt Clumps, EDTA  Cancelled   09/21/22  08:07    


 


Large Platelets  Cancelled   09/21/22  08:07    


 


Giant Platelets  Cancelled   09/21/22  08:07    


 


Platelet Satelliting  Cancelled   09/21/22  08:07    


 


Plt Morphology Comment  Cancelled   09/21/22  08:07    


 


RBC Morphology  Cancelled   09/21/22  08:07    


 


Dimorphic RBCs  Cancelled   09/21/22  08:07    


 


Polychromasia  Cancelled   09/21/22  08:07    


 


Hypochromasia  Cancelled   09/21/22  08:07    


 


Poikilocytosis  Cancelled   09/21/22  08:07    


 


Basophilic Stippling  Cancelled   09/21/22  08:07    


 


Anisocytosis  Cancelled   09/21/22  08:07    


 


Microcytosis  Cancelled   09/21/22  08:07    


 


Macrocytosis  Cancelled   09/21/22  08:07    


 


Spherocytes  Cancelled   09/21/22  08:07    


 


Pappenheimer Bodies  Cancelled   09/21/22  08:07    


 


Sickle Cells  Cancelled   09/21/22  08:07    


 


Target Cells  Cancelled   09/21/22  08:07    


 


Tear Drop Cells  Cancelled   09/21/22  08:07    


 


Ovalocytes  Cancelled   09/21/22  08:07    


 


Stomatocytes  Cancelled   09/21/22  08:07    


 


Helmet Cells  Cancelled   09/21/22  08:07    


 


Soler-Jolly Bodies  Cancelled   09/21/22  08:07    


 


Cabot Rings  Cancelled   09/21/22  08:07    


 


Jamison Cells  Cancelled   09/21/22  08:07    


 


Bite Cells  Cancelled   09/21/22  08:07    


 


Crenated Cell  Cancelled   09/21/22  08:07    


 


Elliptocytes  Cancelled   09/21/22  08:07    


 


Acanthocytes (Spur)  Cancelled   09/21/22  08:07    


 


Rouleaux  Cancelled   09/21/22  08:07    


 


Hemoglobin C Crystals  Cancelled   09/21/22  08:07    


 


Schistocytes  Cancelled   09/21/22  08:07    


 


Malaria parasites  Cancelled   09/21/22  08:07    


 


Tej Bodies  Cancelled   09/21/22  08:07    


 


Hem Pathologist Commnt  Cancelled   09/21/22  08:07    


 


PT  14.5 Sec. (12.2-14.9)   09/12/22  Unknown


 


INR  0.99  (0.87-1.13)   09/12/22  Unknown


 


Heparin Anti-Xa, Unfract  Negative  (Negative)   09/17/22  17:05    


 


ABG pH  7.292 pH Units (7.350-7.450)  L  09/21/22  13:30    


 


ABG pCO2  42.0 mm Hg  09/21/22  13:30    


 


ABG pO2  80.6 mm Hg (80.0-90.0)   09/21/22  13:30    


 


ABG HCO3  19.8 mmol/L (20.0-26.0)  L  09/21/22  13:30    


 


ABG O2 Saturation  95.8 % (95.0-99.0)   09/21/22  13:30    


 


ABG O2 Content  9.4  (0.0-44)   09/21/22  13:30    


 


ABG Base Excess  -6.2 mmol/L (-2.0-3.0)  L  09/21/22  13:30    


 


ABG Hemoglobin  7.0 gm/dl (14.0-18.0)  L  09/21/22  13:30    


 


ABG Carboxyhemoglobin  1.6 % (0.0-5.0)   09/21/22  13:30    


 


ABG Methemoglobin  0.5 % (0.0-1.5)   09/21/22  13:30    


 


Oxyhemoglobin  93.8 % (95.0-99.0)  L  09/21/22  13:30    


 


FiO2  100 %  09/21/22  13:30    


 


Sodium  144 mmol/L (137-145)   09/21/22  08:07    


 


Potassium  3.1 mmol/L (3.6-5.0)  L  09/21/22  08:07    


 


Chloride  105.6 mmol/L ()   09/21/22  08:07    


 


Carbon Dioxide  23 mmol/L (22-30)   09/21/22  08:07    


 


Anion Gap  19 mmol/L  09/21/22  08:07    


 


BUN  35 mg/dL (9-20)  H  09/21/22  08:07    


 


Creatinine  1.7 mg/dL (0.8-1.3)  H  09/21/22  08:07    


 


Estimated GFR  49 ml/min  09/21/22  08:07    


 


BUN/Creatinine Ratio  21 %  09/21/22  08:07    


 


Glucose  165 mg/dL ()  H  09/21/22  08:07    


 


POC Glucose  193 mg/dL ()  H  09/21/22  11:12    


 


Hemoglobin A1c  6.5 % (4-6)  H  09/10/22  08:41    


 


Lactic Acid  1.50 mmol/L (0.7-2.0)   09/10/22  02:26    


 


Calcium  7.5 mg/dL (8.4-10.2)  L  09/21/22  08:07    


 


Phosphorus  3.50 mg/dL (2.5-4.5)   09/21/22  08:07    


 


Magnesium  2.00 mg/dL (1.7-2.3)   09/21/22  08:07    


 


Iron  16 ug/dL ()  L  09/11/22  05:35    


 


TIBC  133 mcg/dL (250-450)  L  09/11/22  05:35    


 


Ferritin  2888.0 ng/mL (30.0-300.0)  H  09/11/22  05:35    


 


Total Bilirubin  1.10 mg/dL (0.1-1.2)   09/21/22  08:07    


 


AST  38 units/L (5-40)   09/21/22  08:07    


 


ALT  30 units/L (7-56)   09/21/22  08:07    


 


Alkaline Phosphatase  191 units/L ()  H  09/21/22  08:07    


 


Total Creatine Kinase  95 units/L ()   09/17/22  07:58    


 


NT-Pro-B Natriuret Pep  3313 pg/mL (0-900)  H  09/19/22  09:50    


 


Total Protein  5.3 g/dL (6.3-8.2)  L  09/21/22  08:07    


 


Albumin  2.0 g/dL (3.9-5)  L  09/21/22  08:07    


 


Albumin/Globulin Ratio  0.6 %  09/21/22  08:07    


 


Triglycerides  114 mg/dL (2-149)   09/10/22  08:41    


 


Cholesterol  116 mg/dL ()   09/10/22  08:41    


 


LDL Cholesterol Direct  46 mg/dL ()  L  09/10/22  08:41    


 


HDL Cholesterol  37 mg/dL (40-59)  L  09/10/22  08:41    


 


Cholesterol/HDL Ratio  3.13 %  09/10/22  08:41    


 


Vitamin B12  > 2000 pg/mL (211-911)  H  09/17/22  07:58    


 


Heparin-induced Plt Ab  Negative  (Negative)   09/17/22  17:05    


 


UF Heparin High Dose  3 % Release  09/17/22  17:05    


 


BRET UFH Low Dose 0.1  2 % Release  09/17/22  17:05    


 


BRET UFH Low Dose 0.5  3 % Release  09/17/22  17:05    


 


SARS-CoV-2 (PCR)  Negative  (Negative)   09/21/22  09:38    


 


Blood Type  O POSITIVE   09/18/22  16:19    


 


Antibody Screen  Negative   09/18/22  16:19    


 


Crossmatch  See Detail   09/18/22  16:19    











Microbiology: 


Microbiology





09/18/22 11:34   Peripheral/Venous   Blood Culture - Preliminary


                            NO GROWTH AFTER 72 HOURS


09/18/22 11:34   Peripheral/Venous   Blood Culture - Preliminary


                            NO GROWTH AFTER 72 HOURS








Toney/IV: 


                                        





Voiding Method                   Condom Catheter











Active Medications





- Current Medications


Current Medications: 














Generic Name Dose Route Start Last Admin





  Trade Name Freq  PRN Reason Stop Dose Admin


 


Acetaminophen  650 mg  09/10/22 08:30  09/18/22 20:37





  Acetaminophen 325 Mg Tab  PO   650 mg





  Q4H PRN   Administration





  Pain MILD(1-3)/Fever >100.5/HA  


 


Atorvastatin Calcium  40 mg  09/13/22 11:00  09/21/22 11:25





  Atorvastatin 40 Mg Tab  PO   40 mg





  DAILY ELVIE   Administration


 


Cilostazol  100 mg  09/12/22 12:00  09/21/22 11:25





  Cilostazol 100 Mg Tab  PO   100 mg





  BID ELVIE   Administration


 


Clopidogrel Bisulfate  75 mg  09/13/22 10:00  09/21/22 11:25





  Clopidogrel 75 Mg Tab  PO   75 mg





  QDAY ELVIE   Administration


 


Dextrose  50 ml  09/10/22 08:35  09/18/22 16:13





  Dextrose 50% In Water (25gm) 50 Ml Syringe  IV   25 ml





  Q30MIN PRN   Administration





  Hypoglycemia  





  Protocol  


 


Famotidine  20 mg  09/21/22 22:00 





  Famotidine 20 Mg Tab  FEEDTUBE  





  QHS ELVIE  


 


Fentanyl  50 mcg  09/21/22 10:36  09/21/22 10:42





  Fentanyl 100 Mcg/2 Ml Inj  IV   50 mcg





  Q2HR PRN   Administration





  Pain , Severe (7-10)  


 


Hydrophilic Ointment  1 applic  09/21/22 09:59 





  Lip Therapy Vaseline  TP  





  Q2HR PRN  





  Dry Lips  


 


Daptomycin 500 mg/ Sodium  100 mls @ 200 mls/hr  09/13/22 18:00  09/20/22 17:58





  Chloride  IV   200 mls/hr





  Q24H ELVIE   Administration





  Protocol  


 


Fentanyl Citrate  1,000 mcg in 100 mls @ 2.5 mls/hr  09/21/22 10:00  09/21/22 

15:08





  Fentanyl Drip Premix  IV   25 mcg/hr





  TITR ELVIE   2.5 mls/hr





    Titration





  Protocol  





  25 MCG/HR  


 


Propofol  1,000 mg in 100 mls @ 2.454 mls/hr  09/21/22 10:00  09/21/22 15:05





  Diprivan 10 Mg/Ml  IV   15 mcg/kg/min





  TITR ELVIE   7.362 mls/hr





    Titration





  Protocol  





  5 MCG/KG/MIN  


 


Meropenem/Sodium Chloride  1 gram in 100 mls @ 100 mls/hr  09/21/22 12:00  

09/21/22 14:00





  Merrem/Ns 1 Gram/100 Ml  IV   100 mls/hr





  Q8H [facility]   Administration





  Protocol  


 


NORepinephrine/NS 8 MG-250 ML  8 mg in 250 mls @ 15.338 mls/hr  09/21/22 16:00 





  Norepinephrine/Ns 8 Mg-250 Ml (Double Conc)  IV  





  TITRATE [facility]  





  Protocol  





  0.1 MCG/KG/MIN  


 


Insulin Glargine  20 units  09/21/22 22:00 





  Insulin Glargine 100 Units/Ml  SUB-Q  





  QHS [facility]  


 


Insulin Human Regular  0 units  09/17/22 07:30  09/21/22 11:34





  Insulin Regular, Human 100 Units/1 Ml  SUB-Q   2 units





  ACHS [facility]   Administration





  Protocol  


 


Metoclopramide HCl  10 mg  09/12/22 17:34  09/18/22 20:54





  Metoclopramide 10 Mg/2 Ml Inj  IV   10 mg





  Q6H PRN   Administration





  Nausea And Vomiting  


 


Metoprolol Tartrate  5 mg  09/20/22 12:00  09/21/22 06:05





  Metoprolol Tartrate 5 Mg/5 Ml Inj  IV   5 mg





  Q6HR [facility]   Administration


 


Multi-Ingred Cream/Lotion/Oil/Oint  1 applic  09/21/22 09:59 





  Mineral Oil/Petrolatum, White Ophth Oint 3.5 Gm  OU  





  Q4HR PRN  





  Dry Eye(s)  


 


Ondansetron HCl  4 mg  09/10/22 08:30  09/18/22 19:18





  Ondansetron 4 Mg/2 Ml Inj  IV   4 mg





  Q8H PRN   Administration





  Nausea And Vomiting  


 


Phenol  1 spray  09/20/22 01:14  09/20/22 01:43





  Phenol 1.4% 177 Ml Bottle  MM   1 spray





  PRN PRN   Administration





  Sore Throat  


 


Senna/Docusate Sodium  1 tab  09/21/22 11:00  09/21/22 11:34





  Sennosides/Docusate Sodium 8.6/50 Mg Tab  FEEDTUBE   1 tab





  BID ELVIE   Administration


 


Sodium Chloride  10 ml  09/10/22 10:00  09/21/22 11:34





  Sodium Chloride 0.9% 10 Ml Flush Syringe  IV   10 ml





  BID ELVIE   Administration


 


Sodium Chloride  10 ml  09/10/22 08:30 





  Sodium Chloride 0.9% 10 Ml Flush Syringe  IV  





  PRN PRN  





  LINE FLUSH  


 


Sodium Chloride  5 ml  09/21/22 11:50 





  Sodium Chloride 0.9% 1000 Ml Iv Soln  IV  





  PRN PRN  





  ART-LINE FLUSH  


 


Sodium Hypochlorite  1 applic  09/17/22 10:00  09/21/22 11:35





  Sodium Hypochlorite, Dakin's 1/2 Strength (0.25%) 473 Ml Topical Soln  TP   1 

applicatio





  BID ELVIE   Administration


 


Vancomycin HCl  125 mg  09/21/22 12:00 





  Vancomycin 250 Mg/10 Ml Oral Liqd  FEEDTUBE  





  Q6HR [facility]  





  Protocol  














Nutrition/Malnutrition Assess





- Dietary Evaluation


Nutrition/Malnutrition Findings: 


                                        





Nutrition Notes                                            Start:  09/11/22 

11:33


Freq:                                                      Status: Active       

 


Protocol:                                                                       

 


 Document     09/21/22 10:16  KARELY  (Rec: 09/21/22 11:10  KARELY  [id number])


 Nutrition Notes


     Initial or Follow up                        Reassessment


     Current Diagnosis                           Acute Kidney Injury,CKD(stage


                                                 I-IV),Diabetes,Sepsis,


                                                 Hypertension,Respiratory


                                                 Failure,Malnutrition


     Other Pertinent Diagnosis                   PVD, PAD, s/p L-Toes


                                                 Amputation, Pulmonary Edema,


                                                 Anemia, Thrombocytopenia.


     Current Diet                                TF-Nepro w/CARBSTEADY @ 40 ml/


                                                 hr (from L 09/21).


     Labs/Tests                                  09/21: K 3.1, BUN 35, Crea 1.7


                                                 , Glu 165, Ca 7.5.


     Pertinent Medications                       09/21: Humulin R 6U, others


                                                 nutritionally unremarkable.


     Height                                      5 ft 8 in


     Weight                                      81.8 kg


     Ideal Body Weight (kg)                      70.00


     BMI                                         27.4


     Weight change and time frame                Discrepancy of 11.7 Kg body


                                                 weight gain in 1 week reported


                                                 .


     Weight Status                               Overweight


     Subjective/Other Information                RD consult for write/manage TF


                                                 assessment.


                                                 Pt currently on NPO. No


                                                 reports available of Pt's PO


                                                 intake of meals prior to


                                                 intubatioin.


                                                 Pt is on Mechanical


                                                 Ventilation since 09/21, O2


                                                 saturation @ 93%, according to


                                                 [facility] Vent notes.


                                                 I will prescribe TF to provide


                                                 Pt with energy/protein needs


                                                 during LOS.


                                                 Pt has missing teeth, but has


                                                 dentures, according to


                                                 Physical Assessment History


                                                 notes.


                                                 Pt continues to present


                                                 diarrhea, and has been


                                                 isolated due to C-Diff


                                                 infection, according to


                                                 [facility] notes.


                                                 Pt is s/p R-Foot


                                                 transmetatarsal amputee poa,


                                                 according to [facility] notes.


                                                 Pt underwent transmetatarsal


                                                 amputation of second to fifth


                                                 toes of his left foot on 09/15


                                                 , according to Operative


                                                 Report notes.


                                                 Pt shows LFoot surgical wound


                                                 as sign of concern for skin


                                                 risk at the time, according to


                                                 Physical Assessment History


                                                 notes.


     Percent of energy/protein needs met:        Prescribed TF-Nepro w/


                                                 CARBSTEADY @ 40 ml/hr provides


                                                 for energy/protein needs (1,


                                                 745 Kcal/79 g) during LOS, 93%


                                                 Kcal; 100% AA.


     Burn                                        Absent


     Trauma                                      Absent


     GI Symptoms                                 Diarrhea,Other


     Food Allergy                                No


     Skin Integrity/Comment                      L-Foot surgical wound.


     Current % PO                                Other


     Minimum of two criteria                     No


     Fluid Accumulation                          Mild (non-severe)


     Protein-Calorie Malnutrition                N\A


     #1


      Nutrition Diagnosis                        Inadequate oral intake


      Comments:                                  Nutrition Diagnosis Change for


                                                 precision.


      Etiology                                   Pt is on Mechanical


                                                 Ventilation.


      As Evidenced by Signs and Symptoms         Pt currently on NPO.


     Is patient on ventilator?                   Yes


     Is Patient Ambulatory and/or Out of Bed     No


     REE-([facility]-confined to bed)      [phone]


     Calculation Used for Recommendations        [facility]


     Additional Notes                            Protein: 0.8-1.2 g/Kg ABW; 66-


                                                 98 g/day.


                                                 Fluids: 1 ml/Kcal, or as per


                                                 MD.


 Nutrition Intervention


     Change Diet Order:                          Discontinued.


     Nutrition Support:                          Start TF-Nepro w/CARBSTEADY @


                                                 40 ml/hr.


                                                 Flush: 200 ml water Q 4 hr, or


                                                 as per MD.


     Kcal                                        1,745


     Protein (gm)                                79


     Carbohydrates (gm)                          156


     Fat (gm)                                    93


     Fluid (mL)                                  705


     Fiber (gm)                                  12


     % RDI:                                      93% Kcal; 100% AA.


     Add Supplement/Snack (indicate name/kcal    Discontinued.


      /protein )                                 


     Goal #1                                     Provide at least 75% of energy


                                                 /protein needs through Enteral


                                                 Feeding during LOS.


     Goal #2                                     Adjust the dietary


                                                 intervention to better serve


                                                 Pt's energy/protein needs and


                                                 clinical conditions during LOS


                                                 .


     Follow-Up By:                               09/23/22


     Additional Comments                         Start monitoring TF tolerance,


                                                 Ventilation status, and BM.

## 2022-09-21 NOTE — PROGRESS NOTE
Assessment and Plan





Cultures:


9/10/2022 blood culture: No growth


9/13/2022 left third toe surgical culture: In process.  Gram stain appears mixed

with GPC in pairs, rare GNR.





A/P:


67-year-old male with diabetes, hypertension, peripheral vascular disease with 

previous history of RLE arterial bypass, right TMA, former smoker quit in 2006 

was admitted to the hospital with left third toe wound:





#Sepsis, secondary to diabetic foot infection, osteomyelitis of left foot third 

toe with abscess: Risk factors diabetes and peripheral vascular disease. WBC 

elevated. S/p revascularization on 9/12/2022, followed by toe amputation 

9/13/2022. Noted to have necrotic tissue.  MRI with abscess between the third 

and fourth toe also extensive bony destruction of the third fourth fifth and 

even the second toe. S/p TMA of second through fifth toes on 9/15/2022





#Thrombocytopenia: ?acute





#JASMEET: Improved.  Renally adjust antibiotics as needed.





#Peripheral vascular disease, history of RLE arterial bypass, prior right TMA. 

S/p revascularization on 9/12/2022.





#Diabetes mellitus type 2, uncontrolled





Recs:


-Continue Daptomycin + Flagyl. Avoiding linezolid due to thrombocytopenia. 


-Change cefepime to meropenem


-CK continues to downtrend, okay to continue IV daptomycin. 


-Surgical cultures are negative, white count remains elevated. 


-Follow up C diff PCR - could explain leukocytosis. 





TERRANCE Rios MD


[facility] ([facility])


O: [phone]


F: [phone]





Subjective


Date of service: 09/21/22


Principal diagnosis: PVD with critical limb


Interval history: 





Febrile overnight to 100.8, remains tachycardic.  White count remains elevated 

to 30.3.  All cultures remain negative so far.  Intubated overnight.





Imaging personally reviewed:


Chest x-ray: Interval worsening of bilateral airspace disease





Objective





- Exam


Narrative Exam: 








Physical Exam: 


Constitutional: Alert, cooperative. No acute distress


Head, Ears, Nose: Normocephalic, atraumatic. 


Eyes: Conjunctivae/corneas clear. No icterus. No ptosis.


Neck: Supple, no meningeal signs


Cardiovascular: S1, S2 +


Respiratory: Good air entry, clear to auscultation bilaterally


GI: Soft, non-tender; bowel sounds normal. No peritoneal signs


Musculoskeletal: Right TMA well-healed, left foot dressing +


Skin: No rash or abscess


Hem/Lymphatic: No palpable cervical or supraclavicular nodes. No lymphangitis


Psych: Mood ok. Affect normal


Neurological: Awake, alert, oriented. No gross abnormality   





- Constitutional


Vitals: 


                                   Vital Signs











Temp Pulse Resp BP Pulse Ox


 


 100.6 F H  122 H  36 H  99/54   93 


 


 09/21/22 03:35  09/21/22 08:30  09/21/22 08:30  09/21/22 08:30  09/21/22 09:24








                           Temperature -Last 24 Hours











Temperature                    100.6 F


 


Temperature                    100.8 F


 


Temperature                    98.7 F


 


Temperature                    98.2 F

















- Labs


CBC & Chem 7: 


                                 09/21/22 08:07





                                 09/21/22 08:07


Labs: 


                              Abnormal lab results











  09/20/22 09/20/22 09/20/22 Range/Units





  09:33 12:02 15:03 


 


WBC    35.1 H  (4.5-11.0)  K/mm3


 


RBC    2.32 L  (3.65-5.03)  M/mm3


 


Hgb    7.1 L  (11.8-15.2)  gm/dl


 


Hct    22.4 L  (35.5-45.6)  %


 


MCV    97 H  (84-94)  fl


 


RDW    17.3 H  (13.2-15.2)  %


 


Plt Count    30 L D  (140-440)  K/mm3


 


Lymph % (Auto)     (13.4-35.0)  %


 


Baso # (Auto)     (0.0-0.1)  K/mm3


 


Seg Neutrophils %     (40.0-70.0)  %


 


Seg Neuts % (Manual)    92.0 H  (40.0-70.0)  %


 


Lymphocytes % (Manual)    2.0 L  (13.4-35.0)  %


 


Nucleated RBC %    2.0 H  (0.0-0.9)  %


 


Seg Neutrophils #     (1.8-7.7)  K/mm3


 


Seg Neutrophils # Man    32.3 H  (1.8-7.7)  K/mm3


 


Lymphocytes # (Manual)    0.7 L  (1.2-5.4)  K/mm3


 


Monocytes # (Manual)    1.1 H  (0.0-0.8)  K/mm3


 


ABG pO2     (80.0-90.0)  mm Hg


 


ABG O2 Saturation     (95.0-99.0)  %


 


ABG Hemoglobin     (14.0-18.0)  gm/dl


 


Oxyhemoglobin     (95.0-99.0)  %


 


Potassium     (3.6-5.0)  mmol/L


 


Carbon Dioxide     (22-30)  mmol/L


 


BUN     (9-20)  mg/dL


 


Creatinine     (0.8-1.3)  mg/dL


 


Glucose     ()  mg/dL


 


POC Glucose  230 H  325 H   ()  mg/dL


 


Calcium     (8.4-10.2)  mg/dL


 


Alkaline Phosphatase     ()  units/L


 


Total Protein     (6.3-8.2)  g/dL


 


Albumin     (3.9-5)  g/dL














  09/20/22 09/20/22 09/20/22 Range/Units





  15:03 16:20 21:23 


 


WBC     (4.5-11.0)  K/mm3


 


RBC     (3.65-5.03)  M/mm3


 


Hgb     (11.8-15.2)  gm/dl


 


Hct     (35.5-45.6)  %


 


MCV     (84-94)  fl


 


RDW     (13.2-15.2)  %


 


Plt Count     (140-440)  K/mm3


 


Lymph % (Auto)     (13.4-35.0)  %


 


Baso # (Auto)     (0.0-0.1)  K/mm3


 


Seg Neutrophils %     (40.0-70.0)  %


 


Seg Neuts % (Manual)     (40.0-70.0)  %


 


Lymphocytes % (Manual)     (13.4-35.0)  %


 


Nucleated RBC %     (0.0-0.9)  %


 


Seg Neutrophils #     (1.8-7.7)  K/mm3


 


Seg Neutrophils # Man     (1.8-7.7)  K/mm3


 


Lymphocytes # (Manual)     (1.2-5.4)  K/mm3


 


Monocytes # (Manual)     (0.0-0.8)  K/mm3


 


ABG pO2     (80.0-90.0)  mm Hg


 


ABG O2 Saturation     (95.0-99.0)  %


 


ABG Hemoglobin     (14.0-18.0)  gm/dl


 


Oxyhemoglobin     (95.0-99.0)  %


 


Potassium  3.2 L    (3.6-5.0)  mmol/L


 


Carbon Dioxide  20 L    (22-30)  mmol/L


 


BUN  30 H    (9-20)  mg/dL


 


Creatinine  1.5 H    (0.8-1.3)  mg/dL


 


Glucose  377 H    ()  mg/dL


 


POC Glucose   356 H  305 H  ()  mg/dL


 


Calcium  7.4 L    (8.4-10.2)  mg/dL


 


Alkaline Phosphatase  180 H    ()  units/L


 


Total Protein  5.3 L    (6.3-8.2)  g/dL


 


Albumin  2.3 L    (3.9-5)  g/dL














  09/21/22 09/21/22 09/21/22 Range/Units





  08:07 08:07 08:20 


 


WBC  30.3 H    (4.5-11.0)  K/mm3


 


RBC  2.41 L    (3.65-5.03)  M/mm3


 


Hgb  7.5 L    (11.8-15.2)  gm/dl


 


Hct  22.7 L    (35.5-45.6)  %


 


MCV     (84-94)  fl


 


RDW  17.3 H    (13.2-15.2)  %


 


Plt Count  22 L    (140-440)  K/mm3


 


Lymph % (Auto)  8.6 L    (13.4-35.0)  %


 


Baso # (Auto)  0.2 H    (0.0-0.1)  K/mm3


 


Seg Neutrophils %  87.6 H    (40.0-70.0)  %


 


Seg Neuts % (Manual)     (40.0-70.0)  %


 


Lymphocytes % (Manual)     (13.4-35.0)  %


 


Nucleated RBC %     (0.0-0.9)  %


 


Seg Neutrophils #  26.5 H    (1.8-7.7)  K/mm3


 


Seg Neutrophils # Man     (1.8-7.7)  K/mm3


 


Lymphocytes # (Manual)     (1.2-5.4)  K/mm3


 


Monocytes # (Manual)     (0.0-0.8)  K/mm3


 


ABG pO2    46.4 L  (80.0-90.0)  mm Hg


 


ABG O2 Saturation    78.6 L  (95.0-99.0)  %


 


ABG Hemoglobin    7.7 L  (14.0-18.0)  gm/dl


 


Oxyhemoglobin    76.7 L  (95.0-99.0)  %


 


Potassium   3.1 L   (3.6-5.0)  mmol/L


 


Carbon Dioxide     (22-30)  mmol/L


 


BUN   35 H   (9-20)  mg/dL


 


Creatinine   1.7 H   (0.8-1.3)  mg/dL


 


Glucose   165 H   ()  mg/dL


 


POC Glucose     ()  mg/dL


 


Calcium   7.5 L   (8.4-10.2)  mg/dL


 


Alkaline Phosphatase   191 H   ()  units/L


 


Total Protein   5.3 L   (6.3-8.2)  g/dL


 


Albumin   2.0 L   (3.9-5)  g/dL

## 2022-09-21 NOTE — XRAY REPORT
CHEST 1 VIEW 9/21/2022 9:59 AM



INDICATION / CLINICAL INFORMATION:

sob.



COMPARISON: 

One view of the chest from 9/20/2022.



FINDINGS:



SUPPORT DEVICES: There has been interval placement of an ET tube with the tip located 4 cm above the 
toni. An esophagogastric tube has been placed with the most distal visualized portion projecting ov
er the gastric fundus.

HEART / MEDIASTINUM: Stable. 

LUNGS / PLEURA: There are increased bilateral airspace opacities. No significant pleural effusion. No
 pneumothorax. 



ADDITIONAL FINDINGS: No significant additional findings.



IMPRESSION:

1. Interval worsening of bilateral airspace disease.

2. ET and esophagogastric tubes as above.



Signer Name: Lamine Griffith MD 

Signed: 9/21/2022 10:44 AM

Workstation Name: [employer] strip:    KY 0.12  QRS 0.10  QT 0.44    HR 63    Sinus rhythm, no ectopy

## 2022-09-21 NOTE — XRAY REPORT
ABDOMEN 1 VIEW(S)



INDICATION / CLINICAL INFORMATION:

ngt placement.



COMPARISON: 

None available.



FINDINGS:



TUBES / LINES: Nasogastric tube tip and side-port are in the mid to distal stomach.

BOWEL GAS PATTERN: No significant abnormality. 

FREE AIR / EXTRALUMINAL GAS: None seen.



ADDITIONAL FINDINGS: No significant additional findings.



IMPRESSION:

1. NG tube in satisfactory position.



Signer Name: Grady Santos MD 

Signed: 9/21/2022 10:36 AM

Workstation Name: [email or web address]

## 2022-09-22 LAB
BACTERIA #/AREA URNS HPF: (no result) /HPF
BUN SERPL-MCNC: 50 MG/DL (ref 9–20)
BUN/CREAT SERPL: 19 %
CALCIUM SERPL-MCNC: 7.5 MG/DL (ref 8.4–10.2)
CREATININE,URINE: 28.8 MG/DL (ref 0.1–20)
CREATININE,URINE: 37.2 MG/DL (ref 0.1–20)
HCO3 BLDA-SCNC: 19.7 MMOL/L (ref 20–26)
HCT VFR BLD CALC: 21.2 % (ref 35.5–45.6)
HCT VFR BLD CALC: 24.4 % (ref 35.5–45.6)
HCT VFR BLD CALC: 24.9 % (ref 35.5–45.6)
HEMOLYSIS INDEX: 2
HGB BLD-MCNC: 6.5 GM/DL (ref 11.8–15.2)
HGB BLD-MCNC: 7.2 GM/DL (ref 11.8–15.2)
HGB BLD-MCNC: 7.5 GM/DL (ref 11.8–15.2)
MCHC RBC AUTO-ENTMCNC: 30 % (ref 32–34)
MCHC RBC AUTO-ENTMCNC: 31 % (ref 32–34)
MCV RBC AUTO: 105 FL (ref 84–94)
MCV RBC AUTO: 99 FL (ref 84–94)
MUCOUS THREADS #/AREA URNS HPF: (no result) /HPF
PCO2 BLDA: 51.8 MM HG
PH BLDA: 7.2 PH UNITS (ref 7.35–7.45)
PLATELET # BLD: 44 K/MM3 (ref 140–440)
PLATELET # BLD: 58 K/MM3 (ref 140–440)
PO2 BLDA: 115.9 MM HG (ref 80–90)
PROTEIN/CREATININE RATIO,URINE: 0.49
RBC # BLD AUTO: 2.16 M/MM3 (ref 3.65–5.03)
RBC # BLD AUTO: 2.32 M/MM3 (ref 3.65–5.03)
RBC #/AREA URNS HPF: 34 /HPF (ref 0–6)
WBC #/AREA URNS HPF: < 1 /HPF (ref 0–6)

## 2022-09-22 RX ADMIN — Medication SCH MLS/HR: at 13:30

## 2022-09-22 RX ADMIN — SODIUM HYPOCHLORITE SCH APPLICATIO: 2.5 SOLUTION TOPICAL at 09:21

## 2022-09-22 RX ADMIN — Medication SCH ML: at 22:50

## 2022-09-22 RX ADMIN — CILOSTAZOL SCH MG: 100 TABLET ORAL at 22:50

## 2022-09-22 RX ADMIN — VANCOMYCIN HYDROCHLORIDE SCH MG: KIT at 06:06

## 2022-09-22 RX ADMIN — VASOPRESSIN SCH MLS/HR: 20 INJECTION PARENTERAL at 12:26

## 2022-09-22 RX ADMIN — DEXTROSE MONOHYDRATE PRN ML: 25 INJECTION, SOLUTION INTRAVENOUS at 21:46

## 2022-09-22 RX ADMIN — FAMOTIDINE SCH MG: 20 TABLET ORAL at 22:50

## 2022-09-22 RX ADMIN — CILOSTAZOL SCH MG: 100 TABLET ORAL at 09:19

## 2022-09-22 RX ADMIN — DEXTROSE MONOHYDRATE PRN ML: 25 INJECTION, SOLUTION INTRAVENOUS at 12:14

## 2022-09-22 RX ADMIN — VASOPRESSIN SCH MLS/HR: 20 INJECTION PARENTERAL at 03:30

## 2022-09-22 RX ADMIN — INSULIN HUMAN SCH: 100 INJECTION, SOLUTION PARENTERAL at 22:00

## 2022-09-22 RX ADMIN — SODIUM HYPOCHLORITE SCH APPLICATIO: 2.5 SOLUTION TOPICAL at 22:00

## 2022-09-22 RX ADMIN — VANCOMYCIN HYDROCHLORIDE SCH MG: KIT at 00:30

## 2022-09-22 RX ADMIN — Medication SCH ML: at 09:20

## 2022-09-22 RX ADMIN — VANCOMYCIN HYDROCHLORIDE SCH MG: KIT at 18:45

## 2022-09-22 RX ADMIN — Medication SCH MLS/HR: at 07:31

## 2022-09-22 RX ADMIN — VASOPRESSIN SCH MLS/HR: 20 INJECTION PARENTERAL at 23:33

## 2022-09-22 RX ADMIN — METOPROLOL TARTRATE SCH: 5 INJECTION INTRAVENOUS at 18:45

## 2022-09-22 RX ADMIN — INSULIN HUMAN SCH: 100 INJECTION, SOLUTION PARENTERAL at 18:56

## 2022-09-22 RX ADMIN — Medication SCH MLS/HR: at 22:24

## 2022-09-22 RX ADMIN — SENNOSIDES AND DOCUSATE SODIUM SCH TAB: 50; 8.6 TABLET ORAL at 09:19

## 2022-09-22 RX ADMIN — MEROPENEM SCH MLS/HR: 1 INJECTION, POWDER, FOR SOLUTION INTRAVENOUS at 03:52

## 2022-09-22 RX ADMIN — INSULIN HUMAN SCH: 100 INJECTION, SOLUTION PARENTERAL at 09:25

## 2022-09-22 RX ADMIN — CLOPIDOGREL BISULFATE SCH MG: 75 TABLET ORAL at 09:19

## 2022-09-22 RX ADMIN — METOPROLOL TARTRATE SCH: 5 INJECTION INTRAVENOUS at 06:05

## 2022-09-22 RX ADMIN — SENNOSIDES AND DOCUSATE SODIUM SCH TAB: 50; 8.6 TABLET ORAL at 22:50

## 2022-09-22 RX ADMIN — METOPROLOL TARTRATE SCH: 5 INJECTION INTRAVENOUS at 00:00

## 2022-09-22 RX ADMIN — Medication SCH MLS/HR: at 10:23

## 2022-09-22 RX ADMIN — INSULIN HUMAN SCH: 100 INJECTION, SOLUTION PARENTERAL at 14:53

## 2022-09-22 RX ADMIN — Medication SCH MLS/HR: at 19:40

## 2022-09-22 RX ADMIN — METOPROLOL TARTRATE SCH: 5 INJECTION INTRAVENOUS at 14:53

## 2022-09-22 RX ADMIN — INSULIN GLARGINE SCH: 100 INJECTION, SOLUTION SUBCUTANEOUS at 22:00

## 2022-09-22 RX ADMIN — Medication SCH MLS/HR: at 01:40

## 2022-09-22 RX ADMIN — VANCOMYCIN HYDROCHLORIDE SCH MG: KIT at 14:56

## 2022-09-22 RX ADMIN — Medication SCH MLS/HR: at 15:57

## 2022-09-22 RX ADMIN — Medication SCH MLS/HR: at 07:00

## 2022-09-22 NOTE — XRAY REPORT
CHEST 1 VIEW 



INDICATION / CLINICAL INFORMATION:

follow up respiratory failure.



COMPARISON: 

9/21/2022



FINDINGS:



SUPPORT DEVICES: Stable, satisfactory device positioning.



HEART / MEDIASTINUM: No significant abnormality. 



LUNGS / PLEURA: There is extensive bilateral pulmonary opacities. Bilateral pulmonary opacities appea
r slightly worse compared to the last chest radiograph. No pneumothorax. 



ADDITIONAL FINDINGS: No significant additional findings.



IMPRESSION:

1. Slight interval worsening of diffuse bilateral pulmonary opacities.



Signer Name: Ruby Mendoza MD 

Signed: 9/22/2022 4:32 AM

Workstation Name: [id number]

## 2022-09-22 NOTE — CAT SCAN REPORT
CT HEAD WITHOUT CONTRAST



INDICATION / CLINICAL INFORMATION: change in mental status.



TECHNIQUE: All CT scans at this location are performed using CT dose reduction for ALARA by means of 
automated exposure control. 



COMPARISON: None available.



FINDINGS:

BRAIN PARENCHYMA: No acute intracranial hemorrhage. No evidence of recent infarct. No mass effect or 
midline shift.

VENTRICULAR SYSTEM/EXTRA-AXIAL SPACES: Ventricles are normal for age. No extra-axial fluid collection
. 

ORBITS: Normal as visualized.

SKELETAL SYSTEM/SOFT TISSUES: Normal bones and soft tissues.

PARANASAL SINUSES/MASTOID AIR CELLS: There is nearly complete opacification of the bilateral mastoid 
air cells. The sinuses are clear.



ADDITIONAL FINDINGS: None.



IMPRESSION:

1. No acute intracranial abnormality.



Signer Name: Lamine Griffith MD 

Signed: 9/22/2022 1:59 PM

Workstation Name: [id number]

## 2022-09-22 NOTE — PROGRESS NOTE
Assessment and Plan


Assessment and plan: 





This is a 67-year-old male with DM, PVD s/p right lower extremity bypass for 

occluded popliteal to dorsalis pedis, s/p stents metatarsal rotation of the 

right foot, s/p left lower extremity SFA/popliteal stenting, CKD admitted with 

osteomyelitis and gangrene of his foot, acute on chronic kidney injury, now with

acute hypoxic respiratory failure. 





Neuro: Acute metabolic encephalopathy


-Sedated with fentanyl and propofol


   -on hold due to neuro status


-RASS goal 0 to -1


-Reorientation as needed


-Maintain sleep-wake cycle


-As needed analgesia


-Bilateral wrist restraints for safety


-CTH pending





Cardiac: Hypotension, h/o hypertension, CAD


-Cardiology consulted, appreciate recommendations


-Blood pressure monitoring via A-line


-Vasopressor support with Levophed, vasopressin


   -MAP goal greater than 65


-Echocardiogram shows EF of 55 to 60%, RVSP 37 mmHg


-Lipitor





Vascular: PVD s/p revascularization followed by amputation


-s/p  revascularization on 9/12/2022, followed by toe amputation 9/13/2022, MRI 

with abscess between the third and fourth toe also extensive bony destruction of

the third fourth fifth and even the second toe. S/p TMA of second through fifth 

toes on 9/15/2022


-General surgery, vascular surgery consulted, appreciate recommendations


-Wound care per nursing


-Plavix, Pletal





Respiratory: Acute hypoxemic respiratory failure, ARDS


-[place] [facility] consulted, appreciate recommendations


-S/p BiPAP


-Intubated on 9/21 with a 8.00 ETT at 20 at the lips


-Current vent settings: Assist-control, tidal volume 350 PEEP 18, rate 30, FiO2 

60 %


   -See RT notes for titration


-A.m. ABG and CXR noted


-VAP bundle


-SPO2 monitoring





GI: Moderate protein calorie malnutrition


-PPI


-NTR consulted for tube feedings


-BR: Senokot S





: Acute on possible chronic kidney disease


-Already consulted, appreciate recommendations


-Urine lites pending


-Renal ultrasound


-May need a Phos binder but will defer to nephrology


-Monitor intake and output


-Renally dose medications


-Avoid nephrotoxic medications


-DC Lasix


-Trend BMP





ID: Sepsis secondary to osteomyelitis of left foot, wet gangrene of the left 

third distal and middle phalanx, diabetic foot infection with abscess of left 

third digit, r/o c diff


-Infectious disease consulted, appreciate recommendations


-MRI with abscess between the third and fourth toe also extensive bony destr

uction of the third fourth fifth and even the second toe. 


-Antibiotic therapy with Daptomycin, Meropenem, Flagyl, PO Vanco


-Contact precautions


-Cdiff PCR pending


-f/u blood culture, surgical culture


-Monitor WBC and temperature curve





Endo: h/o DM


-Hemoglobin A1c 6.5


-Avoid hypoglycemia


-SSI


-Accu-Cheks q. 6


-Long-acting insulin, titrate as needed





Heme: Microcytic anemia secondary to liver dysfunction and sepsis, 

thrombocytopenia secondary to sepsis/diabetic foot infection/osteomyelitis, 

Leukocytosis


-Hematology/oncology consulted, appreciate recommendations


   -Transfuse platelets whenever platelets less than 20, transfuse 1 unit PRBC 

whenever hematocrit less than 23


-HIT negative


-S/p 1 unit PRBC and 3 units of platelets


-IV Ferrlicet while inpatient


-Trend CBC


-Transfuse hemoglobin less than 7


   -hbg 6.5


      -two units prbc


-SCDs to BLE while in bed














The high probability of a clinically significant, sudden or life threatening 

deterioration of the [multi] system(s) required my full and direct attention, 

intervention and personal management. The aggregate critical care time was [60] 

minutes. This time is in addition to time spent performing reported procedures 

but includes the following: 





[x] Data Review and interpretation 





[x] Patient assessment and monitoring of vital signs 





[x] Documentation 





[x] Medication orders and management


Disposition Plan: icu


Total Time Spent with Patient (Minutes): 60





History


Interval history: 


This is a 67-year-old male with DM, PVD s/p right lower extremity bypass for 

occluded popliteal to dorsalis pedis, s/p transmetatarsal amputation of right 

foot, s/p left lower extremity SFA/popliteal stenting, CKD who presented to 

emergency department on 9/10 with complaints of infected diabetic foot.  [facility] white gangrene of left third distal 

phalanx and middle phalanx, diabetic foot infection with ulceration of the left 

third digit, osteomyelitis of left foot, hyperglycemia, pseudohyponatremia, 

metabolic acidosis, leukocytosis, thrombocytopenia and acute kidney injury.  

Patient was admitted to the hospitalist service with consults to vascular and 

general surgery.





ICU course to date:





9/20: Patient was hypoxic requiring supplemental oxygen 15 L/high flow, BiPAP as

 needed suddenly went into acute respiratory failure, and unresponsiveness, code

 met was called, Upon responding to code met, patient was severely hypoxemic on 

BiPAP respiratory therapist recommended intubation and ventilatory support. On 

auscultation bilateral basal crackles, advised 40 mg of IV Lasix x1 dose.  I 

discussed with charge nurse of ICU and pulmonary critical Dr. Marmolejo. To 

transfer the patient to ICU for possible intubation and further management.  I 

also requested critical care consult.. On arrival to ICU, patient was more 

alert, slightly improved, intubation held and pulmonary critical recommended 

BiPAP, Set of labs, chest x-ray, EKG and ABG was requested.





9/21: This morning patient was responsive and ABG was ran which showed hypoxemia

 and patient was intubated.  Patient also received A-line.  Due to multiple 

antibiotics and potential need for vasopressors PICC team was consulted.  

Patient was hypotensive for which 1 L bolus was given with minimal response and 

patient was ultimately placed on vasopressors.  Family updated by nurse pract

itioner and case management.





9/22: Patient noted with sluggish pupils, no cough/gag and stat CT ordered.  

Patient noted to be anemic today and noted to receive 2 units PRBC, nephrology 

consulted due to worsening renal function.





Hospitalist Physical





- Constitutional


Vitals: 


                                        











Temp Pulse Resp BP Pulse Ox


 


 99 F   93 H  26 H  104/41   92 


 


 09/22/22 10:50  09/22/22 12:51  09/22/22 12:10  09/22/22 12:51  09/22/22 12:51











General appearance: Present: no acute distress, well-nourished, other (intubated

)





- EENT


Eyes: Present: PERRL (sluggish)


ENT: dentition normal





- Neck


Neck: Absent: masses or JVD, cervical LAD





- Respiratory


Respiratory effort: normal


Respiratory: bilateral: diminished





- Cardiovascular


Rhythm: regular


Heart Sounds: Present: S1 & S2.  Absent: systolic murmur, diastolic murmur





- Extremities


Extremities: no ischemia, pulses intact


Extremity abnormal: edema


Peripheral Pulses: abnormal





- Abdominal


General gastrointestinal: soft, non-tender, non-distended, normal bowel sounds





- Integumentary


Integumentary: Present: warm, dry





- Psychiatric


Psychiatric: other





- Neurologic


Neurologic: other (pupils sluggish, no cough/gag. sedation held)





- Allied Health


Allied health notes reviewed: nursing, RT, social work





Results





- Labs


CBC & Chem 7: 


                                 09/22/22 05:00





                                 09/22/22 05:00


Labs: 


                             Laboratory Last Values











WBC  22.4 K/mm3 (4.5-11.0)  H  09/22/22  05:00    


 


RBC  2.16 M/mm3 (3.65-5.03)  L  09/22/22  05:00    


 


Hgb  6.5 gm/dl (11.8-15.2)  L  09/22/22  05:00    


 


Hct  21.2 % (35.5-45.6)  L  09/22/22  05:00    


 


MCV  99 fl (84-94)  H  09/22/22  05:00    


 


MCH  30 pg (28-32)   09/22/22  05:00    


 


MCHC  30 % (32-34)  L  09/22/22  05:00    


 


RDW  17.8 % (13.2-15.2)  H  09/22/22  05:00    


 


Plt Count  44 K/mm3 (140-440)  L D 09/22/22  05:00    


 


Lymph % (Auto)  8.6 % (13.4-35.0)  L  09/21/22  08:07    


 


Mono % (Auto)  2.8 % (0.0-7.3)   09/21/22  08:07    


 


Eos % (Auto)  0.2 % (0.0-4.3)   09/21/22  08:07    


 


Baso % (Auto)  0.8 % (0.0-1.8)   09/21/22  08:07    


 


Lymph # (Auto)  2.6 K/mm3 (1.2-5.4)   09/21/22  08:07    


 


Mono # (Auto)  0.8 K/mm3 (0.0-0.8)   09/21/22  08:07    


 


Eos # (Auto)  0.1 K/mm3 (0.0-0.4)   09/21/22  08:07    


 


Baso # (Auto)  0.2 K/mm3 (0.0-0.1)  H  09/21/22  08:07    


 


Add Manual Diff  Complete   09/21/22  08:07    


 


Total Counted  Cancelled   09/21/22  08:07    


 


Seg Neutrophils %  87.6 % (40.0-70.0)  H  09/21/22  08:07    


 


Seg Neuts % (Manual)  Cancelled   09/21/22  08:07    


 


Band Neutrophils %  Cancelled   09/21/22  08:07    


 


Lymphocytes % (Manual)  Cancelled   09/21/22  08:07    


 


Reactive Lymphs % (Man)  Cancelled   09/21/22  08:07    


 


Monocytes % (Manual)  Cancelled   09/21/22  08:07    


 


Eosinophils % (Manual)  Cancelled   09/21/22  08:07    


 


Basophils % (Manual)  Cancelled   09/21/22  08:07    


 


Metamyelocytes %  Cancelled   09/21/22  08:07    


 


Myelocytes %  Cancelled   09/21/22  08:07    


 


Promyelocytes %  Cancelled   09/21/22  08:07    


 


Blast Cells %  Cancelled   09/21/22  08:07    


 


Nucleated RBC %  Cancelled   09/21/22  08:07    


 


Seg Neutrophils #  26.5 K/mm3 (1.8-7.7)  H  09/21/22  08:07    


 


Seg Neutrophils # Man  Cancelled   09/21/22  08:07    


 


Band Neutrophils #  Cancelled   09/21/22  08:07    


 


Lymphocytes # (Manual)  Cancelled   09/21/22  08:07    


 


Abs React Lymphs (Man)  Cancelled   09/21/22  08:07    


 


Monocytes # (Manual)  Cancelled   09/21/22  08:07    


 


Eosinophils # (Manual)  Cancelled   09/21/22  08:07    


 


Basophils # (Manual)  Cancelled   09/21/22  08:07    


 


Metamyelocytes #  Cancelled   09/21/22  08:07    


 


Myelocytes #  Cancelled   09/21/22  08:07    


 


Promyelocytes #  Cancelled   09/21/22  08:07    


 


Blast Cells #  Cancelled   09/21/22  08:07    


 


WBC Morphology  Cancelled   09/21/22  08:07    


 


Hypersegmented Neuts  Cancelled   09/21/22  08:07    


 


Hyposegmented Neuts  Cancelled   09/21/22  08:07    


 


Hypogranular Neuts  Cancelled   09/21/22  08:07    


 


Hypersegmented Polys  Cancelled   09/21/22  08:07    


 


Smudge Cells  Cancelled   09/21/22  08:07    


 


Toxic Granulation  Cancelled   09/21/22  08:07    


 


Toxic Vacuolation  Cancelled   09/21/22  08:07    


 


Dohle Bodies  Cancelled   09/21/22  08:07    


 


Pelger-Huet Anomaly  Cancelled   09/21/22  08:07    


 


Olive Rods  Cancelled   09/21/22  08:07    


 


Platelet Estimate  Cancelled   09/21/22  08:07    


 


Clumped Platelets  Cancelled   09/21/22  08:07    


 


Plt Clumps, EDTA  Cancelled   09/21/22  08:07    


 


Large Platelets  Cancelled   09/21/22  08:07    


 


Giant Platelets  Cancelled   09/21/22  08:07    


 


Platelet Satelliting  Cancelled   09/21/22  08:07    


 


Plt Morphology Comment  Cancelled   09/21/22  08:07    


 


RBC Morphology  Cancelled   09/21/22  08:07    


 


Dimorphic RBCs  Cancelled   09/21/22  08:07    


 


Polychromasia  Cancelled   09/21/22  08:07    


 


Hypochromasia  Cancelled   09/21/22  08:07    


 


Poikilocytosis  Cancelled   09/21/22  08:07    


 


Basophilic Stippling  Cancelled   09/21/22  08:07    


 


Anisocytosis  Cancelled   09/21/22  08:07    


 


Microcytosis  Cancelled   09/21/22  08:07    


 


Macrocytosis  Cancelled   09/21/22  08:07    


 


Spherocytes  Cancelled   09/21/22  08:07    


 


Pappenheimer Bodies  Cancelled   09/21/22  08:07    


 


Sickle Cells  Cancelled   09/21/22  08:07    


 


Target Cells  Cancelled   09/21/22  08:07    


 


Tear Drop Cells  Cancelled   09/21/22  08:07    


 


Ovalocytes  Cancelled   09/21/22  08:07    


 


Stomatocytes  Cancelled   09/21/22  08:07    


 


Helmet Cells  Cancelled   09/21/22  08:07    


 


Soler-Jolly Bodies  Cancelled   09/21/22  08:07    


 


Cabot Rings  Cancelled   09/21/22  08:07    


 


Redfield Cells  Cancelled   09/21/22  08:07    


 


Bite Cells  Cancelled   09/21/22  08:07    


 


Crenated Cell  Cancelled   09/21/22  08:07    


 


Elliptocytes  Cancelled   09/21/22  08:07    


 


Acanthocytes (Spur)  Cancelled   09/21/22  08:07    


 


Rouleaux  Cancelled   09/21/22  08:07    


 


Hemoglobin C Crystals  Cancelled   09/21/22  08:07    


 


Schistocytes  Cancelled   09/21/22  08:07    


 


Malaria parasites  Cancelled   09/21/22  08:07    


 


Tej Bodies  Cancelled   09/21/22  08:07    


 


Hem Pathologist Commnt  Cancelled   09/21/22  08:07    


 


PT  14.5 Sec. (12.2-14.9)   09/12/22  Unknown


 


INR  0.99  (0.87-1.13)   09/12/22  Unknown


 


Heparin Anti-Xa, Unfract  Negative  (Negative)   09/17/22  17:05    


 


ABG pH  7.198 pH Units (7.350-7.450)  L*  09/22/22  03:35    


 


ABG pCO2  51.8 mm Hg  09/22/22  03:35    


 


ABG pO2  115.9 mm Hg (80.0-90.0)  H  09/22/22  03:35    


 


ABG HCO3  19.7 mmol/L (20.0-26.0)  L  09/22/22  03:35    


 


ABG O2 Saturation  97.5 % (95.0-99.0)   09/22/22  03:35    


 


ABG O2 Content  8.3  (0.0-44)   09/22/22  03:35    


 


ABG Base Excess  -7.7 mmol/L (-2.0-3.0)  L  09/22/22  03:35    


 


ABG Hemoglobin  6.0 gm/dl (14.0-18.0)  L  09/22/22  03:35    


 


ABG Carboxyhemoglobin  1.3 % (0.0-5.0)   09/22/22  03:35    


 


ABG Methemoglobin  0.6 % (0.0-1.5)   09/22/22  03:35    


 


Oxyhemoglobin  95.7 % (95.0-99.0)   09/22/22  03:35    


 


FiO2  80 %  09/22/22  03:35    


 


Sodium  144 mmol/L (137-145)   09/22/22  05:00    


 


Potassium  4.5 mmol/L (3.6-5.0)  D 09/22/22  05:00    


 


Chloride  105.0 mmol/L ()   09/22/22  05:00    


 


Carbon Dioxide  18 mmol/L (22-30)  L  09/22/22  05:00    


 


Anion Gap  26 mmol/L  09/22/22  05:00    


 


BUN  50 mg/dL (9-20)  H  09/22/22  05:00    


 


Creatinine  2.7 mg/dL (0.8-1.3)  H D 09/22/22  05:00    


 


Estimated GFR  28 ml/min  09/22/22  05:00    


 


BUN/Creatinine Ratio  19 %  09/22/22  05:00    


 


Glucose  177 mg/dL ()  H  09/22/22  05:00    


 


POC Glucose  81 mg/dL ()   09/22/22  09:25    


 


Hemoglobin A1c  6.5 % (4-6)  H  09/10/22  08:41    


 


Lactic Acid  1.50 mmol/L (0.7-2.0)   09/10/22  02:26    


 


Calcium  7.5 mg/dL (8.4-10.2)  L  09/22/22  05:00    


 


Phosphorus  7.90 mg/dL (2.5-4.5)  H D 09/22/22  05:00    


 


Magnesium  2.40 mg/dL (1.7-2.3)  H  09/22/22  05:00    


 


Iron  16 ug/dL ()  L  09/11/22  05:35    


 


TIBC  133 mcg/dL (250-450)  L  09/11/22  05:35    


 


Ferritin  2888.0 ng/mL (30.0-300.0)  H  09/11/22  05:35    


 


Total Bilirubin  1.10 mg/dL (0.1-1.2)   09/21/22  08:07    


 


AST  38 units/L (5-40)   09/21/22  08:07    


 


ALT  30 units/L (7-56)   09/21/22  08:07    


 


Alkaline Phosphatase  191 units/L ()  H  09/21/22  08:07    


 


Total Creatine Kinase  95 units/L ()   09/17/22  07:58    


 


NT-Pro-B Natriuret Pep  3313 pg/mL (0-900)  H  09/19/22  09:50    


 


Total Protein  5.3 g/dL (6.3-8.2)  L  09/21/22  08:07    


 


Albumin  2.0 g/dL (3.9-5)  L  09/21/22  08:07    


 


Albumin/Globulin Ratio  0.6 %  09/21/22  08:07    


 


Triglycerides  114 mg/dL (2-149)   09/10/22  08:41    


 


Cholesterol  116 mg/dL ()   09/10/22  08:41    


 


LDL Cholesterol Direct  46 mg/dL ()  L  09/10/22  08:41    


 


HDL Cholesterol  37 mg/dL (40-59)  L  09/10/22  08:41    


 


Cholesterol/HDL Ratio  3.13 %  09/10/22  08:41    


 


Vitamin B12  > 2000 pg/mL (211-911)  H  09/17/22  07:58    


 


Heparin-induced Plt Ab  Negative  (Negative)   09/17/22  17:05    


 


UF Heparin High Dose  3 % Release  09/17/22  17:05    


 


BRET UFH Low Dose 0.1  2 % Release  09/17/22  17:05    


 


BRET UFH Low Dose 0.5  3 % Release  09/17/22  17:05    


 


SARS-CoV-2 (PCR)  Negative  (Negative)   09/21/22  09:38    


 


Blood Type  O POSITIVE   09/22/22  09:16    


 


Antibody Screen  Negative   09/22/22  09:16    


 


Crossmatch  See Detail   09/22/22  09:16    











Microbiology: 


Microbiology





09/21/22 Unknown   Tracheal Aspirate   Sputum Culture - Preliminary


09/18/22 11:34   Peripheral/Venous   Blood Culture - Preliminary


                            NO GROWTH AFTER 72 HOURS


09/18/22 11:34   Peripheral/Venous   Blood Culture - Preliminary


                            NO GROWTH AFTER 72 HOURS








Toney/IV: 


                                        





Voiding Method                   Condom Catheter











Active Medications





- Current Medications


Current Medications: 














Generic Name Dose Route Start Last Admin





  Trade Name Freq  PRN Reason Stop Dose Admin


 


Acetaminophen  650 mg  09/10/22 08:30  09/18/22 20:37





  Acetaminophen 325 Mg Tab  PO   650 mg





  Q4H PRN   Administration





  Pain MILD(1-3)/Fever >100.5/HA  


 


Atorvastatin Calcium  40 mg  09/13/22 11:00  09/22/22 09:19





  Atorvastatin 40 Mg Tab  PO   40 mg





  DAILY ELVIE   Administration


 


Cilostazol  100 mg  09/12/22 12:00  09/22/22 09:19





  Cilostazol 100 Mg Tab  PO   100 mg





  BID ELVIE   Administration


 


Clopidogrel Bisulfate  75 mg  09/13/22 10:00  09/22/22 09:19





  Clopidogrel 75 Mg Tab  PO   75 mg





  QDAY ELVIE   Administration


 


Dextrose  50 ml  09/10/22 08:35  09/22/22 12:14





  Dextrose 50% In Water (25gm) 50 Ml Syringe  IV   50 ml





  Q30MIN PRN   Administration





  Hypoglycemia  





  Protocol  


 


Famotidine  20 mg  09/21/22 22:00  09/21/22 22:27





  Famotidine 20 Mg Tab  FEEDTUBE   20 mg





  QHS ELVIE   Administration


 


Fentanyl  50 mcg  09/21/22 10:36  09/21/22 10:42





  Fentanyl 100 Mcg/2 Ml Inj  IV   50 mcg





  Q2HR PRN   Administration





  Pain , Severe (7-10)  


 


Hydrophilic Ointment  1 applic  09/21/22 09:59 





  Lip Therapy Vaseline  TP  





  Q2HR PRN  





  Dry Lips  


 


Fentanyl Citrate  1,000 mcg in 100 mls @ 2.5 mls/hr  09/21/22 10:00  09/22/22 

10:54





  Fentanyl Drip Premix  IV   0 mcg/hr





  TITR ELVIE   0 mls/hr





    Titration





  Protocol  





  25 MCG/HR  


 


Propofol  1,000 mg in 100 mls @ 2.454 mls/hr  09/21/22 10:00  09/22/22 10:12





  Diprivan 10 Mg/Ml  IV   0 mcg/kg/min





  TITR ELVIE   0 mls/hr





    Titration





  Protocol  





  5 MCG/KG/MIN  


 


NORepinephrine/NS 8 MG-250 ML  8 mg in 250 mls @ 15.338 mls/hr  09/21/22 16:00  

09/22/22 10:23





  Norepinephrine/Ns 8 Mg-250 Ml (Double Conc)  IV   0.52 mcg/kg/min





  TITRATE ELVIE   79.755 mls/hr





    Administration





  Protocol  





  0.1 MCG/KG/MIN  


 


Vasopressin 20 unit/ Sodium  101 mls @ 9.09 mls/hr  09/22/22 03:00  09/22/22 

12:26





  Chloride  IV   0.03 units/min





  TITR ELVIE   9.09 mls/hr





    Administration





  Protocol  





  0.03 UNITS/MIN  


 


Sodium Chloride  500 mls @ 0 mls/hr  09/22/22 08:00  09/22/22 09:26





  Nacl 0.9% 500 Ml  IV  09/22/22 19:00  5 mls/hr





  ONCE@0800 ELVIE   Administration





  As Directed  


 


Meropenem/Sodium Chloride  1 gram in 100 mls @ 100 mls/hr  09/22/22 18:00 





  Merrem/Ns 1 Gram/100 Ml  IV  





  Q12H ELVIE  





  Protocol  


 


Daptomycin 500 mg/ Sodium  100 mls @ 200 mls/hr  09/23/22 18:00 





  Chloride  IV  





  Q48H ELVIE  





  Protocol  


 


Insulin Glargine  20 units  09/21/22 22:00  09/21/22 22:41





  Insulin Glargine 100 Units/Ml  SUB-Q   20 units





  QHS ELVIE   Administration


 


Insulin Human Regular  0 units  09/17/22 07:30  09/22/22 09:25





  Insulin Regular, Human 100 Units/1 Ml  SUB-Q   Not Given





  [facility]  





  Protocol  


 


Metoclopramide HCl  5 mg  09/22/22 09:00 





  Metoclopramide 10 Mg/2 Ml Inj  IV  





  Q6H PRN  





  Nausea And Vomiting  


 


Metoprolol Tartrate  5 mg  09/20/22 12:00  09/22/22 06:05





  Metoprolol Tartrate 5 Mg/5 Ml Inj  IV   Not Given





  Q6HR [facility]  


 


Multi-Ingred Cream/Lotion/Oil/Oint  1 applic  09/21/22 09:59 





  Mineral Oil/Petrolatum, White Ophth Oint 3.5 Gm  OU  





  Q4HR PRN  





  Dry Eye(s)  


 


Ondansetron HCl  4 mg  09/10/22 08:30  09/18/22 19:18





  Ondansetron 4 Mg/2 Ml Inj  IV   4 mg





  Q8H PRN   Administration





  Nausea And Vomiting  


 


Phenol  1 spray  09/20/22 01:14  09/20/22 01:43





  Phenol 1.4% 177 Ml Bottle  MM   1 spray





  PRN PRN   Administration





  Sore Throat  


 


Senna/Docusate Sodium  1 tab  09/21/22 11:00  09/22/22 09:19





  Sennosides/Docusate Sodium 8.6/50 Mg Tab  FEEDTUBE   1 tab





  BID ELVIE   Administration


 


Sodium Chloride  10 ml  09/10/22 10:00  09/22/22 09:20





  Sodium Chloride 0.9% 10 Ml Flush Syringe  IV   10 ml





  BID ELVIE   Administration


 


Sodium Chloride  10 ml  09/10/22 08:30 





  Sodium Chloride 0.9% 10 Ml Flush Syringe  IV  





  PRN PRN  





  LINE FLUSH  


 


Sodium Chloride  5 ml  09/21/22 11:50 





  Sodium Chloride 0.9% 1000 Ml Iv Soln  IV  





  PRN PRN  





  ART-LINE FLUSH  


 


Sodium Hypochlorite  1 applic  09/17/22 10:00  09/22/22 09:21





  Sodium Hypochlorite, Dakin's 1/2 Strength (0.25%) 473 Ml Topical Soln  TP   1 

applicatio





  BID ELVIE   Administration


 


Vancomycin HCl  125 mg  09/21/22 12:00  09/22/22 06:06





  Vancomycin 250 Mg/10 Ml Oral Liqd  FEEDTUBE   125 mg





  Q6HR ELVIE   Administration





  Protocol  














Nutrition/Malnutrition Assess





- Dietary Evaluation


Nutrition/Malnutrition Findings: 


                                        





Nutrition Notes                                            Start:  09/11/22 

11:33


Freq:                                                      Status: Active       

 


Protocol:                                                                       

 


 Document     09/21/22 10:16  KARELY  (Rec: 09/21/22 11:10  KARELY  [id number])


 Nutrition Notes


     Initial or Follow up                        Reassessment


     Current Diagnosis                           Acute Kidney Injury,CKD(stage


                                                 I-IV),Diabetes,Sepsis,


                                                 Hypertension,Respiratory


                                                 Failure,Malnutrition


     Other Pertinent Diagnosis                   PVD, PAD, s/p L-Toes


                                                 Amputation, Pulmonary Edema,


                                                 Anemia, Thrombocytopenia.


     Current Diet                                TF-Nepro w/CARBSTEADY @ 40 ml/


                                                 hr (from L 09/21).


     Labs/Tests                                  09/21: K 3.1, BUN 35, Crea 1.7


                                                 , Glu 165, Ca 7.5.


     Pertinent Medications                       09/21: Humulin R 6U, others


                                                 nutritionally unremarkable.


     Height                                      5 ft 8 in


     Weight                                      81.8 kg


     Ideal Body Weight (kg)                      70.00


     BMI                                         27.4


     Weight change and time frame                Discrepancy of 11.7 Kg body


                                                 weight gain in 1 week reported


                                                 .


     Weight Status                               Overweight


     Subjective/Other Information                RD consult for write/manage TF


                                                 assessment.


                                                 Pt currently on NPO. No


                                                 reports available of Pt's PO


                                                 intake of meals prior to


                                                 intubatioin.


                                                 Pt is on Mechanical


                                                 Ventilation since 09/21, O2


                                                 saturation @ 93%, according to


                                                 [facility] Vent notes.


                                                 I will prescribe TF to provide


                                                 Pt with energy/protein needs


                                                 during LOS.


                                                 Pt has missing teeth, but has


                                                 dentures, according to


                                                 Physical Assessment History


                                                 notes.


                                                 Pt continues to present


                                                 diarrhea, and has been


                                                 isolated due to C-Diff


                                                 infection, according to


                                                 Progress notes.


                                                 Pt is s/p R-Foot


                                                 transmetatarsal amputee poa,


                                                 according to Progress notes.


                                                 Pt underwent transmetatarsal


                                                 amputation of second to fifth


                                                 toes of his left foot on 09/15


                                                 , according to Operative


                                                 Report notes.


                                                 Pt shows LFoot surgical wound


                                                 as sign of concern for skin


                                                 risk at the time, according to


                                                 Physical Assessment History


                                                 notes.


     Percent of energy/protein needs met:        Prescribed TF-Nepro w/


                                                 CARBSTEADY @ 40 ml/hr provides


                                                 for energy/protein needs (1,


                                                 745 Kcal/79 g) during LOS, 93%


                                                 Kcal; 100% AA.


     Burn                                        Absent


     Trauma                                      Absent


     GI Symptoms                                 Diarrhea,Other


     Food Allergy                                No


     Skin Integrity/Comment                      L-Foot surgical wound.


     Current % PO                                Other


     Minimum of two criteria                     No


     Fluid Accumulation                          Mild (non-severe)


     Protein-Calorie Malnutrition                N\A


     #1


      Nutrition Diagnosis                        Inadequate oral intake


      Comments:                                  Nutrition Diagnosis Change for


                                                 precision.


      Etiology                                   Pt is on Mechanical


                                                 Ventilation.


      As Evidenced by Signs and Symptoms         Pt currently on NPO.


     Is patient on ventilator?                   Yes


     Is Patient Ambulatory and/or Out of Bed     No


     REE-([facility]-confined to bed)      [phone]


     Calculation Used for Recommendations        [facility]


     Additional Notes                            Protein: 0.8-1.2 g/Kg ABW; 66-


                                                 98 g/day.


                                                 Fluids: 1 ml/Kcal, or as per


                                                 MD.


 Nutrition Intervention


     Change Diet Order:                          Discontinued.


     Nutrition Support:                          Start TF-Nepro w/CARBSTEADY @


                                                 40 ml/hr.


                                                 Flush: 200 ml water Q 4 hr, or


                                                 as per MD.


     Kcal                                        1,745


     Protein (gm)                                79


     Carbohydrates (gm)                          156


     Fat (gm)                                    93


     Fluid (mL)                                  705


     Fiber (gm)                                  12


     % RDI:                                      93% Kcal; 100% AA.


     Add Supplement/Snack (indicate name/kcal    Discontinued.


      /protein )                                 


     Goal #1                                     Provide at least 75% of energy


                                                 /protein needs through Enteral


                                                 Feeding during LOS.


     Goal #2                                     Adjust the dietary


                                                 intervention to better serve


                                                 Pt's energy/protein needs and


                                                 clinical conditions during LOS


                                                 .


     Follow-Up By:                               09/23/22


     Additional Comments                         Start monitoring TF tolerance,


                                                 Ventilation status, and BM.

## 2022-09-22 NOTE — HEM/ONC PROGRESS NOTE
Subjective


Date of service: 09/22/22


Interval history: 





Heme Progress Note


CPT 54772


Dx Thrombocytopenia








66yo male with worsening left foot infection


Past medical history of diabetes, CKD, prior bypass of the RLE 10+ years ago, 

and stenting of the LLE 


Osteomyelitis


S/p revascularization on 9/12/2022, followed by toe amputation 9/13/2022. Noted 

to have necrotic tissue. 


MRI with abscess between the third and fourth toe also extensive bony 

destruction of the third fourth fifth and even the second toe


S/p TMA of second through fifth toes on 9/15/2022. 


Intubated 9/21


Now reports of sluggish pupils and no gag reflex


Hematology following for thrombocytopenia. 





No overt bleeding noted or reported. Intubated and sedated.








DATA REVIEWED BELOW 


HIT negative


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 


Head CT negative





IMP:


Thrombocytopenia secondary to sepsis, diabetic foot infection, osteomyelitis


--reports chronic low platelets, likely due to liver dysfunction


Macrocytic anemia secondary to liver dysfunction and sepsis


--noted with iron deficiency, 12% sat





PLAN:


2 units pRBC 


Monitor CBC


Transfuse 1 dose of plts whenever plts <20


Transfuse 1 unit pRBC whenever HCT <23


IV antix per ID recommendations





Case D/w Dr. Jasbir Coe.








                             Laboratory Last Values











WBC  22.4 K/mm3 (4.5-11.0)  H  09/22/22  05:00    


 


    


 


Hgb  6.5 gm/dl (11.8-15.2)  L  09/22/22  05:00    


 


Hct  21.2 % (35.5-45.6)  L  09/22/22  05:00    


 


MCV  99 fl (84-94)  H  09/22/22  05:00    


 


  


 


  


 


    


 


Plt Count  44 K/mm3 (140-440)  L D 09/22/22  05:00    


 


Lymph % (Auto)  8.6 % (13.4-35.0)  L  09/21/22  08:07    


 


  


 


  


 


  


 


  


 


  


 


  7    


 


Baso # (Auto)  0.2 K/mm3 (0.0-0.1)  H  09/21/22  08:07    


 


  


 


    


 


Seg Neutrophils %  87.6 % (40.0-70.0)  H  09/21/22  08:07    


 


  


 


  


 


  


 


  


 


  


 


  


 


  


 


  


 


  


 


  


 


  


 


      


 


Seg Neutrophils #  26.5 K/mm3 (1.8-7.7)  H  09/21/22  08:07    


 


  


 


  


 


  


 


  


 


  


 


  


 


  


 


  


 


  


 


  


 


  


 


  


 


  


 


  


 


  


 


  


 


  


 


  


 


  


 


  


 


  


 


  


 


  


 


  


 


  


 


  


 


  


 


  


 


  


 


  


 


  


 


  


 


  


 


  


 


  


 


  


 


  


 


  


 


  


 


  


 


  


 


  


 


  


 


  


 


  


 


  


 


  


 


  


 


  


 


  


 


  


 


  


 


  


 


  


 


  


 


  


 


  


 


  


 


     


 


PT  14.5 Sec. (12.2-14.9)   09/12/22  Unknown


 


INR  0.99  (0.87-1.13)   09/12/22  Unknown


 


Heparin Anti-Xa, Unfract  Negative  (Negative)   09/17/22  17:05    


 


  


 


  


 


  


 


  


 


  


 


  


 


  


 


  


 


  


 


  


 


  


 


  


 


  


 


  


 


  


 


  


 


  


 


      


 


Creatinine  2.7 mg/dL (0.8-1.3)  H D 09/22/22  05:00    


 


  


 


  


 


  


 


  


 


  


 


  


 


  


 


  


 


     


 


Iron  16 ug/dL ()  L  09/11/22  05:35    


 


TIBC  133 mcg/dL (250-450)  L  09/11/22  05:35    


 


Ferritin  2888.0 ng/mL (30.0-300.0)  H  09/11/22  05:35    


 


Total Bilirubin  1.10 mg/dL (0.1-1.2)   09/21/22  08:07    


 


AST  38 units/L (5-40)   09/21/22  08:07    


 


ALT  30 units/L (7-56)   09/21/22  08:07    


 


Alkaline Phosphatase  191 units/L ()  H  09/21/22  08:07    


 


  


 


  


 


  


 


  


 


  


 


  


 


  


 


  


 


  


 


  


 


Vitamin B12  > 2000 pg/mL (211-911)  H  09/17/22  07:58    


 


Heparin-induced Plt Ab  Negative  (Negative)   09/17/22  17:05    


 


UF Heparin High Dose  3 % Release  09/17/22  17:05    


 


BRET UFH Low Dose 0.1  2 % Release  09/17/22  17:05    


 


BRET UFH Low Dose 0.5  3 % Release  09/17/22  17:05    


 


SARS-CoV-2 (PCR)  Negative  (Negative)   09/21/22  09:38    


 


Blood Type  O POSITIVE   09/22/22  09:16    


 


Antibody Screen  Negative   09/22/22  09:16    


 


Crossmatch  See Detail   09/22/22  09:16    














Objective





- Constitutional


Vitals: 


                                Last Vital Signs











Temp  98.9 F   09/22/22 13:37


 


Pulse  87   09/22/22 14:30


 


Resp  22   09/22/22 14:30


 


BP  118/36   09/22/22 14:30


 


Pulse Ox  92   09/22/22 12:51














- Labs


Lab Results: 


                         Laboratory Results - last 24 hr











  09/18/22 09/21/22 09/21/22





  16:19 16:47 22:35


 


WBC   


 


RBC   


 


Hgb   


 


Hct   


 


MCV   


 


MCH   


 


MCHC   


 


RDW   


 


Plt Count   


 


ABG pH   


 


ABG pCO2   


 


ABG pO2   


 


ABG HCO3   


 


ABG O2 Saturation   


 


ABG O2 Content   


 


ABG Base Excess   


 


ABG Hemoglobin   


 


ABG Carboxyhemoglobin   


 


ABG Methemoglobin   


 


Oxyhemoglobin   


 


FiO2   


 


Sodium   


 


Potassium   


 


Chloride   


 


Carbon Dioxide   


 


Anion Gap   


 


BUN   


 


Creatinine   


 


Estimated GFR   


 


BUN/Creatinine Ratio   


 


Glucose   


 


POC Glucose   212 H  260 H


 


Calcium   


 


Phosphorus   


 


Magnesium   


 


Blood Type   


 


Antibody Screen   


 


Crossmatch  See Detail  














  09/22/22 09/22/22 09/22/22





  03:35 05:00 05:00


 


WBC   22.4 H 


 


RBC   2.16 L 


 


Hgb   6.5 L 


 


Hct   21.2 L 


 


MCV   99 H 


 


MCH   30 


 


MCHC   30 L 


 


RDW   17.8 H 


 


Plt Count   44 L D 


 


ABG pH  7.198 L*  


 


ABG pCO2  51.8  


 


ABG pO2  115.9 H  


 


ABG HCO3  19.7 L  


 


ABG O2 Saturation  97.5  


 


ABG O2 Content  8.3  


 


ABG Base Excess  -7.7 L  


 


ABG Hemoglobin  6.0 L  


 


ABG Carboxyhemoglobin  1.3  


 


ABG Methemoglobin  0.6  


 


Oxyhemoglobin  95.7  


 


FiO2  80  


 


Sodium    144


 


Potassium    4.5  D


 


Chloride    105.0


 


Carbon Dioxide    18 L


 


Anion Gap    26


 


BUN    50 H


 


Creatinine    2.7 H D


 


Estimated GFR    28


 


BUN/Creatinine Ratio    19


 


Glucose    177 H


 


POC Glucose   


 


Calcium    7.5 L


 


Phosphorus    7.90 H D


 


Magnesium    2.40 H


 


Blood Type   


 


Antibody Screen   


 


Crossmatch   














  09/22/22 09/22/22 09/22/22





  09:16 09:25 12:10


 


WBC   


 


RBC   


 


Hgb   


 


Hct   


 


MCV   


 


MCH   


 


MCHC   


 


RDW   


 


Plt Count   


 


ABG pH   


 


ABG pCO2   


 


ABG pO2   


 


ABG HCO3   


 


ABG O2 Saturation   


 


ABG O2 Content   


 


ABG Base Excess   


 


ABG Hemoglobin   


 


ABG Carboxyhemoglobin   


 


ABG Methemoglobin   


 


Oxyhemoglobin   


 


FiO2   


 


Sodium   


 


Potassium   


 


Chloride   


 


Carbon Dioxide   


 


Anion Gap   


 


BUN   


 


Creatinine   


 


Estimated GFR   


 


BUN/Creatinine Ratio   


 


Glucose   


 


POC Glucose   81  61 L


 


Calcium   


 


Phosphorus   


 


Magnesium   


 


Blood Type  O POSITIVE  


 


Antibody Screen  Negative  


 


Crossmatch  See Detail  














  09/22/22





  12:33


 


WBC 


 


RBC 


 


Hgb 


 


Hct 


 


MCV 


 


MCH 


 


MCHC 


 


RDW 


 


Plt Count 


 


ABG pH 


 


ABG pCO2 


 


ABG pO2 


 


ABG HCO3 


 


ABG O2 Saturation 


 


ABG O2 Content 


 


ABG Base Excess 


 


ABG Hemoglobin 


 


ABG Carboxyhemoglobin 


 


ABG Methemoglobin 


 


Oxyhemoglobin 


 


FiO2 


 


Sodium 


 


Potassium 


 


Chloride 


 


Carbon Dioxide 


 


Anion Gap 


 


BUN 


 


Creatinine 


 


Estimated GFR 


 


BUN/Creatinine Ratio 


 


Glucose 


 


POC Glucose  104


 


Calcium 


 


Phosphorus 


 


Magnesium 


 


Blood Type 


 


Antibody Screen 


 


Crossmatch 














Medications & Allergies





- Medications


Allergies/Adverse Reactions: 


                                    Allergies





No Known Allergies Allergy (Verified 09/13/22 13:17)


   








Home Medications: 


                                Home Medications











 Medication  Instructions  Recorded  Confirmed  Last Taken  Type


 


Tamsulosin [Flomax] 0.4 mg PO QDAY 09/10/22 09/13/22 Unknown History


 


traZODone [Desyrel] 100 mg PO QHS 09/10/22 09/13/22 Unknown History


 


AtorvaSTATin [Lipitor] 40 mg PO QHS 09/13/22 09/13/22 Unknown History


 


Chlorthalidone [Thalitone] 25 mg PO QDAY 09/13/22 09/13/22 Unknown History


 


Insulin Glargine,Hum.rec.anlog 60 units SQ QDAY 09/13/22 09/13/22 Unknown 

History





[Lantus Solostar]     


 


Lisinopril [Zestril TAB] 30 mg PO QDAY 09/13/22 09/13/22 Unknown History


 


Lispro Insulin [HumaLOG] 10 units SQ TIDWM 09/13/22 09/13/22 Unknown History


 


Metoprolol [Lopressor] 100 mg PO BID 09/13/22 09/13/22 Unknown History


 


Multivitamin 1 each PO QDAY 09/13/22 09/13/22 Unknown History


 


Sertraline [Zoloft] 100 mg PO QDAY 09/13/22 09/13/22 Unknown History


 


Timolol Maleate/Pf [Timolol 1 drop OU BID 09/13/22 09/13/22 Unknown History





Maleate 0.5% Eye Drop]     


 


cilostazoL [Pletal] 100 mg PO BID 09/13/22 09/13/22 Unknown History











Active Medications: 














Generic Name Dose Route Start Last Admin





  Trade Name Freq  PRN Reason Stop Dose Admin


 


Acetaminophen  650 mg  09/10/22 08:30  09/18/22 20:37





  Acetaminophen 325 Mg Tab  PO   650 mg





  Q4H PRN   Administration





  Pain MILD(1-3)/Fever >100.5/HA  


 


Atorvastatin Calcium  40 mg  09/13/22 11:00  09/22/22 09:19





  Atorvastatin 40 Mg Tab  PO   40 mg





  DAILY ELVIE   Administration


 


Cilostazol  100 mg  09/12/22 12:00  09/22/22 09:19





  Cilostazol 100 Mg Tab  PO   100 mg





  BID ELVIE   Administration


 


Clopidogrel Bisulfate  75 mg  09/13/22 10:00  09/22/22 09:19





  Clopidogrel 75 Mg Tab  PO   75 mg





  QDAY ELVIE   Administration


 


Dextrose  50 ml  09/10/22 08:35  09/22/22 12:14





  Dextrose 50% In Water (25gm) 50 Ml Syringe  IV   50 ml





  Q30MIN PRN   Administration





  Hypoglycemia  





  Protocol  


 


Famotidine  20 mg  09/21/22 22:00  09/21/22 22:27





  Famotidine 20 Mg Tab  FEEDTUBE   20 mg





  QHS ELVIE   Administration


 


Fentanyl  50 mcg  09/21/22 10:36  09/21/22 10:42





  Fentanyl 100 Mcg/2 Ml Inj  IV   50 mcg





  Q2HR PRN   Administration





  Pain , Severe (7-10)  


 


Hydrophilic Ointment  1 applic  09/21/22 09:59 





  Lip Therapy Vaseline  TP  





  Q2HR PRN  





  Dry Lips  


 


Fentanyl Citrate  1,000 mcg in 100 mls @ 2.5 mls/hr  09/21/22 10:00  09/22/22 

10:54





  Fentanyl Drip Premix  IV   0 mcg/hr





  TITR ELVIE   0 mls/hr





    Titration





  Protocol  





  25 MCG/HR  


 


Propofol  1,000 mg in 100 mls @ 2.454 mls/hr  09/21/22 10:00  09/22/22 10:12





  Diprivan 10 Mg/Ml  IV   0 mcg/kg/min





  TITR ELVIE   0 mls/hr





    Titration





  Protocol  





  5 MCG/KG/MIN  


 


NORepinephrine/NS 8 MG-250 ML  8 mg in 250 mls @ 15.338 mls/hr  09/21/22 16:00  

09/22/22 13:30





  Norepinephrine/Ns 8 Mg-250 Ml (Double Conc)  IV   0.52 mcg/kg/min





  TITRATE ELVIE   79.755 mls/hr





    Administration





  Protocol  





  0.1 MCG/KG/MIN  


 


Vasopressin 20 unit/ Sodium  101 mls @ 9.09 mls/hr  09/22/22 03:00  09/22/22 

12:26





  Chloride  IV   0.03 units/min





  TITR ELVIE   9.09 mls/hr





    Administration





  Protocol  





  0.03 UNITS/MIN  


 


Sodium Chloride  500 mls @ 0 mls/hr  09/22/22 08:00  09/22/22 09:26





  Nacl 0.9% 500 Ml  IV  09/22/22 19:00  5 mls/hr





  ONCE@0800 ELVIE   Administration





  As Directed  


 


Meropenem/Sodium Chloride  1 gram in 100 mls @ 100 mls/hr  09/22/22 18:00 





  Merrem/Ns 1 Gram/100 Ml  IV  





  Q12H ELVIE  





  Protocol  


 


Daptomycin 500 mg/ Sodium  100 mls @ 200 mls/hr  09/23/22 18:00 





  Chloride  IV  





  Q48H [facility]  





  Protocol  


 


Insulin Glargine  20 units  09/21/22 22:00  09/21/22 22:41





  Insulin Glargine 100 Units/Ml  SUB-Q   20 units





  QHS ELVIE   Administration


 


Insulin Human Regular  0 units  09/17/22 07:30  09/22/22 14:53





  Insulin Regular, Human 100 Units/1 Ml  SUB-Q   Not Given





  AC[facility]  





  Protocol  


 


Metoclopramide HCl  5 mg  09/22/22 09:00 





  Metoclopramide 10 Mg/2 Ml Inj  IV  





  Q6H PRN  





  Nausea And Vomiting  


 


Metoprolol Tartrate  5 mg  09/20/22 12:00  09/22/22 14:53





  Metoprolol Tartrate 5 Mg/5 Ml Inj  IV   Not Given





  Q6HR [facility]  


 


Multi-Ingred Cream/Lotion/Oil/Oint  1 applic  09/21/22 09:59 





  Mineral Oil/Petrolatum, White Ophth Oint 3.5 Gm  OU  





  Q4HR PRN  





  Dry Eye(s)  


 


Ondansetron HCl  4 mg  09/10/22 08:30  09/18/22 19:18





  Ondansetron 4 Mg/2 Ml Inj  IV   4 mg





  Q8H PRN   Administration





  Nausea And Vomiting  


 


Phenol  1 spray  09/20/22 01:14  09/20/22 01:43





  Phenol 1.4% 177 Ml Bottle  MM   1 spray





  PRN PRN   Administration





  Sore Throat  


 


Senna/Docusate Sodium  1 tab  09/21/22 11:00  09/22/22 09:19





  Sennosides/Docusate Sodium 8.6/50 Mg Tab  FEEDTUBE   1 tab





  BID ELVIE   Administration


 


Sodium Bicarbonate  50 meq  09/22/22 16:00 





  Sodium Bicarb 8.4% 50 Meq/50 Ml Syringe  IV  09/22/22 16:01 





  ONCE ONE  


 


Sodium Chloride  10 ml  09/10/22 10:00  09/22/22 09:20





  Sodium Chloride 0.9% 10 Ml Flush Syringe  IV   10 ml





  BID ELVIE   Administration


 


Sodium Chloride  10 ml  09/10/22 08:30 





  Sodium Chloride 0.9% 10 Ml Flush Syringe  IV  





  PRN PRN  





  LINE FLUSH  


 


Sodium Chloride  5 ml  09/21/22 11:50 





  Sodium Chloride 0.9% 1000 Ml Iv Soln  IV  





  PRN PRN  





  ART-LINE FLUSH  


 


Sodium Hypochlorite  1 applic  09/17/22 10:00  09/22/22 09:21





  Sodium Hypochlorite, Dakin's 1/2 Strength (0.25%) 473 Ml Topical Soln  TP   1 

applicatio





  BID ELVIE   Administration


 


Vancomycin HCl  125 mg  09/21/22 12:00  09/22/22 14:56





  Vancomycin 250 Mg/10 Ml Oral Liqd  FEEDTUBE   125 mg





  Q6HR ELVIE   Administration





  Protocol

## 2022-09-22 NOTE — CONSULTATION
History of Present Illness





- Reason for Consult


Consult date: 09/22/22


acute renal failure, metabolic acidosis





- History of Present Illness





This is a 67-year-old male with DM, PVD s/p right lower extremity bypass for 

occluded popliteal to dorsalis pedis, s/p stents metatarsal rotation of the r

ight foot, s/p left lower extremity SFA/popliteal stenting, CKD admitted with 

osteomyelitis and gangrene of his foot, acute on chronic kidney injury, now with

acute hypoxic respiratory failure. 





Nephrology consulted for JASMEET, acidosis- noted to have hypotension, worsening 

respiratory status and brought to ICU 9/21, now intubated and sedated





Past History


Past Medical History: diabetes, PVD (s/p RLE bypass 10+ years ago, occluded 

(pop-dp) ; s/p LLE SFA/pop stenting at outside facility years ago), renal 

failure (CKD)


Past Surgical History: Other (PVD surgery; transmetatarsal amputation of right 

foot)


Social history: no significant social history


Family history: no significant family history





Medications and Allergies


                                    Allergies











Allergy/AdvReac Type Severity Reaction Status Date / Time


 


No Known Allergies Allergy   Verified 09/13/22 13:17











                                Home Medications











 Medication  Instructions  Recorded  Confirmed  Last Taken  Type


 


Tamsulosin [Flomax] 0.4 mg PO QDAY 09/10/22 09/13/22 Unknown History


 


traZODone [Desyrel] 100 mg PO QHS 09/10/22 09/13/22 Unknown History


 


AtorvaSTATin [Lipitor] 40 mg PO QHS 09/13/22 09/13/22 Unknown History


 


Chlorthalidone [Thalitone] 25 mg PO QDAY 09/13/22 09/13/22 Unknown History


 


Insulin Glargine,Hum.rec.anlog 60 units SQ QDAY 09/13/22 09/13/22 Unknown 

History





[Lantus Solostar]     


 


Lisinopril [Zestril TAB] 30 mg PO QDAY 09/13/22 09/13/22 Unknown History


 


Lispro Insulin [HumaLOG] 10 units SQ TIDWM 09/13/22 09/13/22 Unknown History


 


Metoprolol [Lopressor] 100 mg PO BID 09/13/22 09/13/22 Unknown History


 


Multivitamin 1 each PO QDAY 09/13/22 09/13/22 Unknown History


 


Sertraline [Zoloft] 100 mg PO QDAY 09/13/22 09/13/22 Unknown History


 


Timolol Maleate/Pf [Timolol 1 drop OU BID 09/13/22 09/13/22 Unknown History





Maleate 0.5% Eye Drop]     


 


cilostazoL [Pletal] 100 mg PO BID 09/13/22 09/13/22 Unknown History











Active Meds: 


Active Medications





Acetaminophen (Acetaminophen 325 Mg Tab)  650 mg PO Q4H PRN


   PRN Reason: Pain MILD(1-3)/Fever >100.5/HA


   Last Admin: 09/18/22 20:37 Dose:  650 mg


   


Atorvastatin Calcium (Atorvastatin 40 Mg Tab)  40 mg PO DAILY [facility]


   Last Admin: 09/21/22 11:25 Dose:  40 mg


   


Cilostazol (Cilostazol 100 Mg Tab)  100 mg PO BID [facility]


   Last Admin: 09/21/22 22:27 Dose:  100 mg


   


Clopidogrel Bisulfate (Clopidogrel 75 Mg Tab)  75 mg PO QDAY [facility]


   Last Admin: 09/21/22 11:25 Dose:  75 mg


   


Dextrose (Dextrose 50% In Water (25gm) 50 Ml Syringe)  50 ml IV Q30MIN PRN; 

Protocol


   PRN Reason: Hypoglycemia


   Last Admin: 09/18/22 16:13 Dose:  25 ml


   


Famotidine (Famotidine 20 Mg Tab)  20 mg FEEDTUBE QHS [facility]


   Last Admin: 09/21/22 22:27 Dose:  20 mg


   


Fentanyl (Fentanyl 100 Mcg/2 Ml Inj)  50 mcg IV Q2HR PRN


   PRN Reason: Pain , Severe (7-10)


   Last Admin: 09/21/22 10:42 Dose:  50 mcg


   


Hydrophilic Ointment (Lip Therapy Vaseline)  1 applic TP Q2HR PRN


   PRN Reason: Dry Lips


Fentanyl Citrate (Fentanyl Drip Premix)  1,000 mcg in 100 mls @ 2.5 mls/hr IV 

TITR [facility]; Protocol


   Last Admin: 09/22/22 07:31 Dose:  30 mcg/hr, 3 mls/hr


   


Propofol (Diprivan 10 Mg/Ml)  1,000 mg in 100 mls @ 2.454 mls/hr IV TITR [facility]; 

Protocol


   Last Titration: 09/22/22 03:00 Dose:  5 mcg/kg/min, 2.454 mls/hr


   


NORepinephrine/NS 8 MG-250 ML (Norepinephrine/Ns 8 Mg-250 Ml (Double Conc))  8 

mg in 250 mls @ 15.338 mls/hr IV TITRATE [facility]; Protocol


   Last Titration: 09/22/22 07:35 Dose:  0.43 mcg/kg/min, 65.951 mls/hr


   


Vasopressin 20 unit/ Sodium (Chloride)  101 mls @ 9.09 mls/hr IV TITR [facility]; 

Protocol


   Last Admin: 09/22/22 03:30 Dose:  0.03 units/min, 9.09 mls/hr


   


Sodium Chloride (Nacl 0.9% 500 Ml)  500 mls @ 0 mls/hr IV ONCE@0800 [facility]


   Stop: 09/22/22 19:00


Meropenem/Sodium Chloride (Merrem/Ns 1 Gram/100 Ml)  1 gram in 100 mls @ 100 

mls/hr IV Q12H [facility]; Protocol


Daptomycin 500 mg/ Sodium (Chloride)  100 mls @ 200 mls/hr IV Q48H [facility]; Protocol


Insulin Glargine (Insulin Glargine 100 Units/Ml)  20 units SUB-Q QHS [facility]


   Last Admin: 09/21/22 22:41 Dose:  20 units


   


Insulin Human Regular (Insulin Regular, Human 100 Units/1 Ml)  0 units SUB-Q 

ACHS [facility]; Protocol


   Last Admin: 09/21/22 22:41 Dose:  4 units


   


Metoclopramide HCl (Metoclopramide 10 Mg/2 Ml Inj)  5 mg IV Q6H PRN


   PRN Reason: Nausea And Vomiting


Metoprolol Tartrate (Metoprolol Tartrate 5 Mg/5 Ml Inj)  5 mg IV Q6HR [facility]


   Last Admin: 09/22/22 06:05 Dose:  Not Given


   


Multi-Ingred Cream/Lotion/Oil/Oint (Mineral Oil/Petrolatum, White Ophth Oint 3.5

Gm)  1 applic OU Q4HR PRN


   PRN Reason: Dry Eye(s)


Ondansetron HCl (Ondansetron 4 Mg/2 Ml Inj)  4 mg IV Q8H PRN


   PRN Reason: Nausea And Vomiting


   Last Admin: 09/18/22 19:18 Dose:  4 mg


   


Phenol (Phenol 1.4% 177 Ml Bottle)  1 spray MM PRN PRN


   PRN Reason: Sore Throat


   Last Admin: 09/20/22 01:43 Dose:  1 spray


   


Senna/Docusate Sodium (Sennosides/Docusate Sodium 8.6/50 Mg Tab)  1 tab FEEDTUBE

BID [facility]


   Last Admin: 09/21/22 22:27 Dose:  1 tab


   


Sodium Chloride (Sodium Chloride 0.9% 10 Ml Flush Syringe)  10 ml IV BID [facility]


   Last Admin: 09/21/22 22:27 Dose:  10 ml


   


Sodium Chloride (Sodium Chloride 0.9% 10 Ml Flush Syringe)  10 ml IV PRN PRN


   PRN Reason: LINE FLUSH


Sodium Chloride (Sodium Chloride 0.9% 1000 Ml Iv Soln)  5 ml IV PRN PRN


   PRN Reason: ART-LINE FLUSH


Sodium Hypochlorite (Sodium Hypochlorite, Dakin's 1/2 Strength (0.25%) 473 Ml 

Topical Soln)  1 applic TP BID [facility]


   Last Admin: 09/21/22 22:27 Dose:  1 applicatio


   


Vancomycin HCl (Vancomycin 250 Mg/10 Ml Oral Liqd)  125 mg FEEDTUBE Q6HR [facility]; 

Protocol


   Last Admin: 09/22/22 06:06 Dose:  125 mg


   











Review of Systems


ROS unobtainable: due to endotracheal tube, due to mental status





Exam





- Vital Signs


Vital signs: 


                                   Vital Signs











Temp Pulse Resp BP Pulse Ox


 


 99.0 F   109 H  16   124/49   99 


 


 09/09/22 21:14  09/09/22 21:14  09/09/22 21:14  09/09/22 21:14  09/09/22 21:14














- Physical Exam


Narrative exam: 





General appearance: Present: mild distress, intubated, sedated





- EENT


Eyes: Present: PERRL, EOM intact


ENT: dentition normal





- Neck


Neck: Present: normal ROM





- Respiratory


Respiratory effort: labored


Respiratory: bilateral: diminished





- Cardiovascular


Rhythm: regular


Heart Sounds: Present: S1 & S2.  





- Extremities


Extremities: no ischemia, No edema, normal temperature, normal color





- Abdominal


General gastrointestinal: soft, non-tender, normal bowel sounds





- Integumentary


Integumentary: Present: warm, dry





- Psychiatric


Psychiatric: other





- Neurologic


Neurologic: other (responds to painful stimuli, PERRL, intact cough/gag)





Results





- Lab Results





                                 09/22/22 05:00





                                 09/22/22 05:00


                             Most recent lab results











ABG pH  7.198 pH Units (7.350-7.450)  L*  09/22/22  03:35    


 


ABG pCO2  51.8 mm Hg  09/22/22  03:35    


 


ABG pO2  115.9 mm Hg (80.0-90.0)  H  09/22/22  03:35    


 


ABG HCO3  19.7 mmol/L (20.0-26.0)  L  09/22/22  03:35    


 


ABG O2 Saturation  97.5 % (95.0-99.0)   09/22/22  03:35    


 


Calcium  7.5 mg/dL (8.4-10.2)  L  09/22/22  05:00    


 


Phosphorus  7.90 mg/dL (2.5-4.5)  H D 09/22/22  05:00    


 


Magnesium  2.40 mg/dL (1.7-2.3)  H  09/22/22  05:00    














Assessment and Plan





# Acute Kidney Injury: suspect JASMEET in setting of tubular injury, worsening 

anemia, hypotension.  Would consider hemolytic uremic syndrome/TTP as well.  

Creatinine 0.9->1.7->2.7 this AM, concerning trend.


- start IV HCO3 repletion; avoid diuretics for now


- check for markers of hemolysis; note prior hematology input regarding 

anemia/thrombocytopenia


- continue aggressive ICU support, including vasopressors prn


- check urine studies as able, check serologies although less likely glomerular 

injury


- check CK (on daptomycin)


- renal imaging WNL


- strict Is/Os


- avoid nephrotoxins


- renally dose medications


- no immediate need for renal replacement therapy, however at high risk given 

clinical trends- would plan for vascath placement tomorrow if renal indices 

continue to worsen as anticipate would need in next 24-48 hours; will reevaluate

patient this afternoon and in AM





# Hypotension: agree with vasopressor support





# Acute Hypoxemic Respiratory Failure/ARDS: appreciate ICU team





# Osteomyelitis of foot, Gangrene, peripheral vascular disease: note ID, 

vascular input 





# Diabetes: per primary





# Anemia, Thrombocytopenia: consider hemolysis, HIT negative

## 2022-09-22 NOTE — PROGRESS NOTE
Assessment and Plan





Cultures:


9/10/2022 blood culture: No growth


9/13/2022 left third toe surgical culture: In process.  Gram stain appears mixed

with GPC in pairs, rare GNR.





A/P:


67-year-old male with diabetes, hypertension, peripheral vascular disease with 

previous history of RLE arterial bypass, right TMA, former smoker quit in 2006 

was admitted to the hospital with left third toe wound:





#Sepsis, secondary to diabetic foot infection, osteomyelitis of left foot third 

toe with abscess: Risk factors diabetes and peripheral vascular disease. WBC 

elevated. S/p revascularization on 9/12/2022, followed by toe amputation 

9/13/2022. Noted to have necrotic tissue.  MRI with abscess between the third 

and fourth toe also extensive bony destruction of the third fourth fifth and 

even the second toe. S/p TMA of second through fifth toes on 9/15/2022





#Thrombocytopenia: ?acute





#JASMEET: Improved.  Renally adjust antibiotics as needed.





#Peripheral vascular disease, history of RLE arterial bypass, prior right TMA. 

S/p revascularization on 9/12/2022.





#Diabetes mellitus type 2, uncontrolled





Recs:


-Continue Daptomycin + Flagyl. Avoiding linezolid due to thrombocytopenia. 


-Continue meropenem


-CK continues to downtrend, okay to continue IV daptomycin. 


-Surgical cultures are negative, white count remains elevated. 


-Follow up C diff PCR - could explain leukocytosis. 





TERRANCE Rios MD


[facility] ([facility]


O: [phone]


F: [phone]





Subjective


Date of service: 09/22/22


Principal diagnosis: PVD with critical limb


Interval history: 





Afebrile overnight, white count improving at 22.4.  Cultures remain negative.





Images reviewed:


Chest x-ray: Slight worsening of diffuse bilateral pulmonary opacities





Objective





- Exam


Narrative Exam: 








Physical Exam: 


Constitutional: Alert, cooperative. No acute distress


Head, Ears, Nose: Normocephalic, atraumatic. 


Eyes: Conjunctivae/corneas clear. No icterus. No ptosis.


Neck: Supple, no meningeal signs


Cardiovascular: S1, S2 +


Respiratory: Good air entry, clear to auscultation bilaterally


GI: Soft, non-tender; bowel sounds normal. No peritoneal signs


Musculoskeletal: Right TMA well-healed, left foot dressing +


Skin: No rash or abscess


Hem/Lymphatic: No palpable cervical or supraclavicular nodes. No lymphangitis


Psych: Mood ok. Affect normal


Neurological: Awake, alert, oriented. No gross abnormality   





- Constitutional


Vitals: 


                                   Vital Signs











Temp Pulse Resp BP Pulse Ox


 


 99 F   94 H  21   105/40   100 


 


 09/22/22 10:50  09/22/22 10:50  09/22/22 10:50  09/22/22 10:50  09/22/22 10:50








                           Temperature -Last 24 Hours











Temperature                    99 F


 


Temperature                    99 F


 


Temperature                    98 F


 


Temperature                    98.1 F

















- Labs


CBC & Chem 7: 


                                 09/22/22 05:00





                                 09/22/22 05:00


Labs: 


                              Abnormal lab results











  09/18/22 09/21/22 09/21/22 Range/Units





  16:19 11:10 11:12 


 


WBC     (4.5-11.0)  K/mm3


 


RBC     (3.65-5.03)  M/mm3


 


Hgb     (11.8-15.2)  gm/dl


 


Hct     (35.5-45.6)  %


 


MCV     (84-94)  fl


 


MCHC     (32-34)  %


 


RDW     (13.2-15.2)  %


 


Plt Count     (140-440)  K/mm3


 


ABG pH   7.270 L   (7.350-7.450)  pH Units


 


ABG pO2   54.4 L   (80.0-90.0)  mm Hg


 


ABG HCO3   19.5 L   (20.0-26.0)  mmol/L


 


ABG O2 Saturation   78.1 L   (95.0-99.0)  %


 


ABG Base Excess   -6.9 L   (-2.0-3.0)  mmol/L


 


ABG Hemoglobin   7.6 L   (14.0-18.0)  gm/dl


 


Oxyhemoglobin   76.3 L   (95.0-99.0)  %


 


Carbon Dioxide     (22-30)  mmol/L


 


BUN     (9-20)  mg/dL


 


Creatinine     (0.8-1.3)  mg/dL


 


Glucose     ()  mg/dL


 


POC Glucose    193 H  ()  mg/dL


 


Calcium     (8.4-10.2)  mg/dL


 


Phosphorus     (2.5-4.5)  mg/dL


 


Magnesium     (1.7-2.3)  mg/dL


 


Crossmatch  See Detail    














  09/21/22 09/21/22 09/21/22 Range/Units





  13:30 16:47 22:35 


 


WBC     (4.5-11.0)  K/mm3


 


RBC     (3.65-5.03)  M/mm3


 


Hgb     (11.8-15.2)  gm/dl


 


Hct     (35.5-45.6)  %


 


MCV     (84-94)  fl


 


MCHC     (32-34)  %


 


RDW     (13.2-15.2)  %


 


Plt Count     (140-440)  K/mm3


 


ABG pH  7.292 L    (7.350-7.450)  pH Units


 


ABG pO2     (80.0-90.0)  mm Hg


 


ABG HCO3  19.8 L    (20.0-26.0)  mmol/L


 


ABG O2 Saturation     (95.0-99.0)  %


 


ABG Base Excess  -6.2 L    (-2.0-3.0)  mmol/L


 


ABG Hemoglobin  7.0 L    (14.0-18.0)  gm/dl


 


Oxyhemoglobin  93.8 L    (95.0-99.0)  %


 


Carbon Dioxide     (22-30)  mmol/L


 


BUN     (9-20)  mg/dL


 


Creatinine     (0.8-1.3)  mg/dL


 


Glucose     ()  mg/dL


 


POC Glucose   212 H  260 H  ()  mg/dL


 


Calcium     (8.4-10.2)  mg/dL


 


Phosphorus     (2.5-4.5)  mg/dL


 


Magnesium     (1.7-2.3)  mg/dL


 


Crossmatch     














  09/22/22 09/22/22 09/22/22 Range/Units





  03:35 05:00 05:00 


 


WBC   22.4 H   (4.5-11.0)  K/mm3


 


RBC   2.16 L   (3.65-5.03)  M/mm3


 


Hgb   6.5 L   (11.8-15.2)  gm/dl


 


Hct   21.2 L   (35.5-45.6)  %


 


MCV   99 H   (84-94)  fl


 


MCHC   30 L   (32-34)  %


 


RDW   17.8 H   (13.2-15.2)  %


 


Plt Count   44 L D   (140-440)  K/mm3


 


ABG pH  7.198 L*    (7.350-7.450)  pH Units


 


ABG pO2  115.9 H    (80.0-90.0)  mm Hg


 


ABG HCO3  19.7 L    (20.0-26.0)  mmol/L


 


ABG O2 Saturation     (95.0-99.0)  %


 


ABG Base Excess  -7.7 L    (-2.0-3.0)  mmol/L


 


ABG Hemoglobin  6.0 L    (14.0-18.0)  gm/dl


 


Oxyhemoglobin     (95.0-99.0)  %


 


Carbon Dioxide    18 L  (22-30)  mmol/L


 


BUN    50 H  (9-20)  mg/dL


 


Creatinine    2.7 H D  (0.8-1.3)  mg/dL


 


Glucose    177 H  ()  mg/dL


 


POC Glucose     ()  mg/dL


 


Calcium    7.5 L  (8.4-10.2)  mg/dL


 


Phosphorus    7.90 H D  (2.5-4.5)  mg/dL


 


Magnesium    2.40 H  (1.7-2.3)  mg/dL


 


Crossmatch     














  09/22/22 Range/Units





  09:16 


 


WBC   (4.5-11.0)  K/mm3


 


RBC   (3.65-5.03)  M/mm3


 


Hgb   (11.8-15.2)  gm/dl


 


Hct   (35.5-45.6)  %


 


MCV   (84-94)  fl


 


MCHC   (32-34)  %


 


RDW   (13.2-15.2)  %


 


Plt Count   (140-440)  K/mm3


 


ABG pH   (7.350-7.450)  pH Units


 


ABG pO2   (80.0-90.0)  mm Hg


 


ABG HCO3   (20.0-26.0)  mmol/L


 


ABG O2 Saturation   (95.0-99.0)  %


 


ABG Base Excess   (-2.0-3.0)  mmol/L


 


ABG Hemoglobin   (14.0-18.0)  gm/dl


 


Oxyhemoglobin   (95.0-99.0)  %


 


Carbon Dioxide   (22-30)  mmol/L


 


BUN   (9-20)  mg/dL


 


Creatinine   (0.8-1.3)  mg/dL


 


Glucose   ()  mg/dL


 


POC Glucose   ()  mg/dL


 


Calcium   (8.4-10.2)  mg/dL


 


Phosphorus   (2.5-4.5)  mg/dL


 


Magnesium   (1.7-2.3)  mg/dL


 


Crossmatch  See Detail

## 2022-09-22 NOTE — ULTRASOUND REPORT
ULTRASOUND RENAL



INDICATION / CLINICAL INFORMATION: larissa.



COMPARISON: None available.



FINDINGS:

RIGHT KIDNEY: Length = 9.8 cm. 

- Echogenicity: Normal.

- Parenchymal Thickness: Normal.

- Hydronephrosis: None.

- Cyst / Mass: None.  

- Stones: 2.6 cm cyst lower pole 



LEFT KIDNEY: Length = 9.2 cm. 

- Echogenicity: Normal.

- Parenchymal Thickness: Normal.

- Hydronephrosis: None.

- Cyst / Mass: None.  

- Stones: None seen. 



URINARY BLADDER: No significant abnormality.

FREE FLUID: None.



ADDITIONAL FINDINGS: None.



IMPRESSION:

1. No no acute or significant sonographic renal abnormality.



Signer Name: Padmini Baum MD 

Signed: 9/22/2022 9:49 AM

Workstation Name: [email or web address]

## 2022-09-22 NOTE — PROGRESS NOTE
Assessment and Plan





68 y/o male with gangrene of foot, osteomyelitis s/p amputation now with acute 

respiratory failure concern for volume overload





9/22/22:  Renal has already been consulted so will defer to them.  Was 

considering starting patient on bicarb.  Will wean FiO2 but keep PEEP where it 

is.  Abx for osteo.  Picc placed yesterday and hopeful that midline has been 

removed.  Overall prognosis is guarded





9/21/22:  Stop lasix.  Had some difficulty with hypoxemia post intubation.  Has 

responded to pEEP and repeat ABG is acceptable.  Will wean for sats >88% and or 

PaO2>55.  Art line placed.  Sedation.  Guarded prognosis.





1.  Agree with trial of lasix


2.  Given that mental state has improved, will hold on intubation for now and 

attempt PPV with bipap


3.  Per patient has severe anxiety, but want to wait given his recent 

improvement in mental state


4.  Abx per ID


5.  Prognosis is guarded.





CCT 31 minutes.  





Subjective


Date of service: 09/22/22


Principal diagnosis: PVD with critical limb


Interval history: 





Worsening metabolic acidosis and renal function.  Oxygenation is improved. CXR 

is slightly worse per rads read but appears stable to me.





Objective


                               Vital Signs - 12hr











  09/21/22 09/21/22 09/21/22





  21:30 22:00 22:30


 


Pulse Rate 120 H 118 H 118 H


 


Pulse Rate [   





From Monitor]   


 


Respiratory 23 24 23





Rate   


 


Blood Pressure 111/43 107/41 111/43


 


O2 Sat by Pulse 99 100 99





Oximetry   














  09/21/22 09/21/22 09/21/22





  23:00 23:06 23:30


 


Pulse Rate 117 H 117 H 118 H


 


Pulse Rate [   





From Monitor]   


 


Respiratory 25 H 22 23





Rate   


 


Blood Pressure 101/41 101/41 101/41


 


O2 Sat by Pulse 100 100 100





Oximetry   














  09/21/22 09/22/22 09/22/22





  23:37 00:00 00:30


 


Pulse Rate 116 H 117 H 115 H


 


Pulse Rate [  116 H 





From Monitor]   


 


Respiratory  28 H 22





Rate   


 


Blood Pressure 88/33 115/40 115/40


 


O2 Sat by Pulse 100 100 100





Oximetry   














  09/22/22 09/22/22 09/22/22





  01:00 01:30 01:46


 


Pulse Rate 114 H 114 H 119 H


 


Pulse Rate [   





From Monitor]   


 


Respiratory 22 22 22





Rate   


 


Blood Pressure 101/39  101/39


 


O2 Sat by Pulse 100 100 100





Oximetry   














  09/22/22 09/22/22 09/22/22





  02:00 02:16 02:30


 


Pulse Rate 113 H 112 H 111 H


 


Pulse Rate [   





From Monitor]   


 


Respiratory 22 24 27 H





Rate   


 


Blood Pressure 97/40 97/40 97/40


 


O2 Sat by Pulse 100 100 100





Oximetry   














  09/22/22 09/22/22 09/22/22





  02:46 03:00 03:16


 


Pulse Rate 111 H 111 H 109 H


 


Pulse Rate [   





From Monitor]   


 


Respiratory 25 H 22 25 H





Rate   


 


Blood Pressure 97/40 95/38 95/38


 


O2 Sat by Pulse 100 100 100





Oximetry   














  09/22/22 09/22/22 09/22/22





  03:30 03:46 04:00


 


Pulse Rate 109 H 110 H 115 H


 


Pulse Rate [   115 H





From Monitor]   


 


Respiratory 24 26 H 26 H





Rate   


 


Blood Pressure 95/38 95/38 115/45


 


O2 Sat by Pulse 95 100 97





Oximetry   














  09/22/22 09/22/22 09/22/22





  04:07 04:16 04:30


 


Pulse Rate 116 H 117 H 116 H


 


Pulse Rate [   





From Monitor]   


 


Respiratory  22 25 H





Rate   


 


Blood Pressure 110/43 115/45 115/45


 


O2 Sat by Pulse 92 92 93





Oximetry   














  09/22/22 09/22/22 09/22/22





  04:45 05:00 05:16


 


Pulse Rate 116 H 114 H 110 H


 


Pulse Rate [   





From Monitor]   


 


Respiratory 23 28 H 16





Rate   


 


Blood Pressure 115/45 103/41 115/45


 


O2 Sat by Pulse 93 92 85





Oximetry   














  09/22/22 09/22/22 09/22/22





  05:30 05:46 06:00


 


Pulse Rate 109 H 108 H 107 H


 


Pulse Rate [   





From Monitor]   


 


Respiratory 20 24 28 H





Rate   


 


Blood Pressure 115/45 115/45 102/38


 


O2 Sat by Pulse 95  





Oximetry   














  09/22/22 09/22/22





  06:16 07:48


 


Pulse Rate 107 H 102 H


 


Pulse Rate [  





From Monitor]  


 


Respiratory 22 





Rate  


 


Blood Pressure 103/41 108/39


 


O2 Sat by Pulse  95





Oximetry  











CBC and BMP: 


                                 09/22/22 05:00





                                 09/22/22 05:00


ABG, PT/INR, D-dimer: 


ABG











ABG pH  7.198 pH Units (7.350-7.450)  L*  09/22/22  03:35    


 


ABG pCO2  51.8 mm Hg  09/22/22  03:35    


 


ABG pO2  115.9 mm Hg (80.0-90.0)  H  09/22/22  03:35    


 


ABG O2 Saturation  97.5 % (95.0-99.0)   09/22/22  03:35    





PT/INR, D-dimer











PT  14.5 Sec. (12.2-14.9)   09/12/22  Unknown


 


INR  0.99  (0.87-1.13)   09/12/22  Unknown








Abnormal lab findings: 


                                  Abnormal Labs











  09/10/22 09/10/22 09/10/22





  02:26 02:26 08:41


 


WBC  27.0 H  


 


RBC  2.52 L  


 


Hgb  8.2 L  


 


Hct  25.5 L  


 


MCV  101 H  


 


MCH  33 H  


 


MCHC   


 


RDW  13.1 L  


 


Plt Count  31 L  


 


Lymph % (Auto)   


 


Mono % (Auto)   


 


Lymph # (Auto)   


 


Mono # (Auto)   


 


Baso # (Auto)   


 


Seg Neutrophils %   


 


Seg Neuts % (Manual)  88.0 H  


 


Lymphocytes % (Manual)  7.0 L  


 


Monocytes % (Manual)   


 


Nucleated RBC %   


 


Seg Neutrophils #   


 


Seg Neutrophils # Man  23.8 H  


 


Lymphocytes # (Manual)   


 


Monocytes # (Manual)   


 


ABG pH   


 


ABG pO2   


 


ABG HCO3   


 


ABG O2 Saturation   


 


ABG Base Excess   


 


ABG Hemoglobin   


 


Oxyhemoglobin   


 


Sodium   129 L 


 


Potassium   


 


Chloride   91.5 L 


 


Carbon Dioxide   16 L 


 


BUN   57 H 


 


Creatinine   1.7 H 


 


Glucose   509 H* 


 


POC Glucose   


 


Hemoglobin A1c    6.5 H


 


Calcium   


 


Phosphorus   


 


Magnesium   


 


Iron   


 


TIBC   


 


Ferritin   


 


AST   41 H 


 


Alkaline Phosphatase   


 


Total Creatine Kinase   


 


NT-Pro-B Natriuret Pep   


 


Total Protein   


 


Albumin   3.6 L 


 


LDL Cholesterol Direct   


 


HDL Cholesterol   


 


Vitamin B12   


 


Crossmatch   














  09/10/22 09/10/22 09/10/22





  08:41 09:19 11:22


 


WBC   


 


RBC   


 


Hgb   


 


Hct   


 


MCV   


 


MCH   


 


MCHC   


 


RDW   


 


Plt Count   


 


Lymph % (Auto)   


 


Mono % (Auto)   


 


Lymph # (Auto)   


 


Mono # (Auto)   


 


Baso # (Auto)   


 


Seg Neutrophils %   


 


Seg Neuts % (Manual)   


 


Lymphocytes % (Manual)   


 


Monocytes % (Manual)   


 


Nucleated RBC %   


 


Seg Neutrophils #   


 


Seg Neutrophils # Man   


 


Lymphocytes # (Manual)   


 


Monocytes # (Manual)   


 


ABG pH   


 


ABG pO2   


 


ABG HCO3   


 


ABG O2 Saturation   


 


ABG Base Excess   


 


ABG Hemoglobin   


 


Oxyhemoglobin   


 


Sodium   


 


Potassium   


 


Chloride   


 


Carbon Dioxide   


 


BUN   


 


Creatinine   


 


Glucose   


 


POC Glucose   394 H  343 H


 


Hemoglobin A1c   


 


Calcium   


 


Phosphorus   


 


Magnesium   


 


Iron   


 


TIBC   


 


Ferritin   


 


AST   


 


Alkaline Phosphatase   


 


Total Creatine Kinase   


 


NT-Pro-B Natriuret Pep   


 


Total Protein   


 


Albumin   


 


LDL Cholesterol Direct  46 L  


 


HDL Cholesterol  37 L  


 


Vitamin B12   


 


Crossmatch   














  09/10/22 09/10/22 09/10/22





  15:52 17:44 18:16


 


WBC   


 


RBC   


 


Hgb   


 


Hct   


 


MCV   


 


MCH   


 


MCHC   


 


RDW   


 


Plt Count   


 


Lymph % (Auto)   


 


Mono % (Auto)   


 


Lymph # (Auto)   


 


Mono # (Auto)   


 


Baso # (Auto)   


 


Seg Neutrophils %   


 


Seg Neuts % (Manual)   


 


Lymphocytes % (Manual)   


 


Monocytes % (Manual)   


 


Nucleated RBC %   


 


Seg Neutrophils #   


 


Seg Neutrophils # Man   


 


Lymphocytes # (Manual)   


 


Monocytes # (Manual)   


 


ABG pH   


 


ABG pO2   


 


ABG HCO3   


 


ABG O2 Saturation   


 


ABG Base Excess   


 


ABG Hemoglobin   


 


Oxyhemoglobin   


 


Sodium   


 


Potassium   


 


Chloride   


 


Carbon Dioxide   


 


BUN   


 


Creatinine   


 


Glucose   


 


POC Glucose  515 H  558 H  464 H


 


Hemoglobin A1c   


 


Calcium   


 


Phosphorus   


 


Magnesium   


 


Iron   


 


TIBC   


 


Ferritin   


 


AST   


 


Alkaline Phosphatase   


 


Total Creatine Kinase   


 


NT-Pro-B Natriuret Pep   


 


Total Protein   


 


Albumin   


 


LDL Cholesterol Direct   


 


HDL Cholesterol   


 


Vitamin B12   


 


Crossmatch   














  09/10/22 09/11/22 09/11/22





  20:35 05:35 05:35


 


WBC   21.2 H 


 


RBC   2.49 L 


 


Hgb   7.9 L 


 


Hct   25.3 L 


 


MCV   102 H 


 


MCH   


 


MCHC   31 L 


 


RDW   13.1 L 


 


Plt Count   27 L 


 


Lymph % (Auto)   


 


Mono % (Auto)   


 


Lymph # (Auto)   


 


Mono # (Auto)   


 


Baso # (Auto)   


 


Seg Neutrophils %   


 


Seg Neuts % (Manual)   81.5 H 


 


Lymphocytes % (Manual)   12.0 L 


 


Monocytes % (Manual)   


 


Nucleated RBC %   


 


Seg Neutrophils #   


 


Seg Neutrophils # Man   17.3 H 


 


Lymphocytes # (Manual)   


 


Monocytes # (Manual)   1.0 H 


 


ABG pH   


 


ABG pO2   


 


ABG HCO3   


 


ABG O2 Saturation   


 


ABG Base Excess   


 


ABG Hemoglobin   


 


Oxyhemoglobin   


 


Sodium    134 L


 


Potassium   


 


Chloride   


 


Carbon Dioxide    21 L


 


BUN    29 H


 


Creatinine   


 


Glucose    179 H


 


POC Glucose  359 H  


 


Hemoglobin A1c   


 


Calcium   


 


Phosphorus   


 


Magnesium   


 


Iron    16 L


 


TIBC    133 L


 


Ferritin   


 


AST   


 


Alkaline Phosphatase   


 


Total Creatine Kinase   


 


NT-Pro-B Natriuret Pep   


 


Total Protein   


 


Albumin   


 


LDL Cholesterol Direct   


 


HDL Cholesterol   


 


Vitamin B12   


 


Crossmatch   














  09/11/22 09/11/22 09/11/22





  05:35 05:50 07:39


 


WBC   


 


RBC   


 


Hgb   


 


Hct   


 


MCV   


 


MCH   


 


MCHC   


 


RDW   


 


Plt Count   


 


Lymph % (Auto)   


 


Mono % (Auto)   


 


Lymph # (Auto)   


 


Mono # (Auto)   


 


Baso # (Auto)   


 


Seg Neutrophils %   


 


Seg Neuts % (Manual)   


 


Lymphocytes % (Manual)   


 


Monocytes % (Manual)   


 


Nucleated RBC %   


 


Seg Neutrophils #   


 


Seg Neutrophils # Man   


 


Lymphocytes # (Manual)   


 


Monocytes # (Manual)   


 


ABG pH   


 


ABG pO2   


 


ABG HCO3   


 


ABG O2 Saturation   


 


ABG Base Excess   


 


ABG Hemoglobin   


 


Oxyhemoglobin   


 


Sodium   


 


Potassium   


 


Chloride   


 


Carbon Dioxide   


 


BUN   


 


Creatinine   


 


Glucose   


 


POC Glucose   164 H  180 H


 


Hemoglobin A1c   


 


Calcium   


 


Phosphorus   


 


Magnesium   


 


Iron   


 


TIBC   


 


Ferritin  2888.0 H  


 


AST   


 


Alkaline Phosphatase   


 


Total Creatine Kinase   


 


NT-Pro-B Natriuret Pep   


 


Total Protein   


 


Albumin   


 


LDL Cholesterol Direct   


 


HDL Cholesterol   


 


Vitamin B12   


 


Crossmatch   














  09/11/22 09/11/22 09/12/22





  11:39 16:33 06:01


 


WBC   


 


RBC   


 


Hgb   


 


Hct   


 


MCV   


 


MCH   


 


MCHC   


 


RDW   


 


Plt Count   


 


Lymph % (Auto)   


 


Mono % (Auto)   


 


Lymph # (Auto)   


 


Mono # (Auto)   


 


Baso # (Auto)   


 


Seg Neutrophils %   


 


Seg Neuts % (Manual)   


 


Lymphocytes % (Manual)   


 


Monocytes % (Manual)   


 


Nucleated RBC %   


 


Seg Neutrophils #   


 


Seg Neutrophils # Man   


 


Lymphocytes # (Manual)   


 


Monocytes # (Manual)   


 


ABG pH   


 


ABG pO2   


 


ABG HCO3   


 


ABG O2 Saturation   


 


ABG Base Excess   


 


ABG Hemoglobin   


 


Oxyhemoglobin   


 


Sodium   


 


Potassium   


 


Chloride   


 


Carbon Dioxide   


 


BUN   


 


Creatinine   


 


Glucose   


 


POC Glucose  202 H  182 H  150 H


 


Hemoglobin A1c   


 


Calcium   


 


Phosphorus   


 


Magnesium   


 


Iron   


 


TIBC   


 


Ferritin   


 


AST   


 


Alkaline Phosphatase   


 


Total Creatine Kinase   


 


NT-Pro-B Natriuret Pep   


 


Total Protein   


 


Albumin   


 


LDL Cholesterol Direct   


 


HDL Cholesterol   


 


Vitamin B12   


 


Crossmatch   














  09/12/22 09/12/22 09/12/22





  08:50 12:09 13:02


 


WBC   22.5 H 


 


RBC   2.70 L 


 


Hgb   8.8 L 


 


Hct   27.2 L 


 


MCV   101 H 


 


MCH   33 H 


 


MCHC   


 


RDW   


 


Plt Count   44 L 


 


Lymph % (Auto)   


 


Mono % (Auto)   


 


Lymph # (Auto)   


 


Mono # (Auto)   


 


Baso # (Auto)   


 


Seg Neutrophils %   


 


Seg Neuts % (Manual)   77.0 H 


 


Lymphocytes % (Manual)   11.0 L 


 


Monocytes % (Manual)   12.0 H 


 


Nucleated RBC %   


 


Seg Neutrophils #   


 


Seg Neutrophils # Man   17.3 H 


 


Lymphocytes # (Manual)   


 


Monocytes # (Manual)   2.7 H 


 


ABG pH   


 


ABG pO2   


 


ABG HCO3   


 


ABG O2 Saturation   


 


ABG Base Excess   


 


ABG Hemoglobin   


 


Oxyhemoglobin   


 


Sodium   


 


Potassium   


 


Chloride   


 


Carbon Dioxide   


 


BUN   


 


Creatinine   


 


Glucose   


 


POC Glucose  191 H   228 H


 


Hemoglobin A1c   


 


Calcium   


 


Phosphorus   


 


Magnesium   


 


Iron   


 


TIBC   


 


Ferritin   


 


AST   


 


Alkaline Phosphatase   


 


Total Creatine Kinase   


 


NT-Pro-B Natriuret Pep   


 


Total Protein   


 


Albumin   


 


LDL Cholesterol Direct   


 


HDL Cholesterol   


 


Vitamin B12   


 


Crossmatch   














  09/12/22 09/12/22 09/12/22





  20:41 Unknown Unknown


 


WBC   17.9 H 


 


RBC   2.71 L 


 


Hgb   8.6 L 


 


Hct   27.3 L 


 


MCV   101 H 


 


MCH   


 


MCHC   31 L 


 


RDW   13.1 L 


 


Plt Count   47 L 


 


Lymph % (Auto)   


 


Mono % (Auto)   11.9 H 


 


Lymph # (Auto)   


 


Mono # (Auto)   2.1 H 


 


Baso # (Auto)   


 


Seg Neutrophils %   70.7 H 


 


Seg Neuts % (Manual)   


 


Lymphocytes % (Manual)   


 


Monocytes % (Manual)   


 


Nucleated RBC %   


 


Seg Neutrophils #   12.6 H 


 


Seg Neutrophils # Man   


 


Lymphocytes # (Manual)   


 


Monocytes # (Manual)   


 


ABG pH   


 


ABG pO2   


 


ABG HCO3   


 


ABG O2 Saturation   


 


ABG Base Excess   


 


ABG Hemoglobin   


 


Oxyhemoglobin   


 


Sodium    136 L


 


Potassium   


 


Chloride   


 


Carbon Dioxide   


 


BUN   


 


Creatinine   


 


Glucose    174 H


 


POC Glucose  380 H  


 


Hemoglobin A1c   


 


Calcium   


 


Phosphorus   


 


Magnesium   


 


Iron   


 


TIBC   


 


Ferritin   


 


AST   


 


Alkaline Phosphatase   


 


Total Creatine Kinase   


 


NT-Pro-B Natriuret Pep   


 


Total Protein   


 


Albumin   


 


LDL Cholesterol Direct   


 


HDL Cholesterol   


 


Vitamin B12   


 


Crossmatch   














  09/13/22 09/13/22 09/13/22





  05:10 05:10 05:49


 


WBC  25.0 H  


 


RBC  2.51 L  


 


Hgb  8.1 L  


 


Hct  24.9 L  


 


MCV  99 H  


 


MCH   


 


MCHC   


 


RDW   


 


Plt Count  41 L  


 


Lymph % (Auto)   


 


Mono % (Auto)   


 


Lymph # (Auto)   


 


Mono # (Auto)   


 


Baso # (Auto)   


 


Seg Neutrophils %   


 


Seg Neuts % (Manual)  91.0 H  


 


Lymphocytes % (Manual)  6.0 L  


 


Monocytes % (Manual)   


 


Nucleated RBC %   


 


Seg Neutrophils #   


 


Seg Neutrophils # Man  22.8 H  


 


Lymphocytes # (Manual)   


 


Monocytes # (Manual)   


 


ABG pH   


 


ABG pO2   


 


ABG HCO3   


 


ABG O2 Saturation   


 


ABG Base Excess   


 


ABG Hemoglobin   


 


Oxyhemoglobin   


 


Sodium   


 


Potassium   


 


Chloride   


 


Carbon Dioxide   


 


BUN   


 


Creatinine   


 


Glucose   197 H 


 


POC Glucose    230 H


 


Hemoglobin A1c   


 


Calcium   


 


Phosphorus   


 


Magnesium   


 


Iron   


 


TIBC   


 


Ferritin   


 


AST   


 


Alkaline Phosphatase   


 


Total Creatine Kinase   816 H 


 


NT-Pro-B Natriuret Pep   


 


Total Protein   


 


Albumin   


 


LDL Cholesterol Direct   


 


HDL Cholesterol   


 


Vitamin B12   


 


Crossmatch   














  09/13/22 09/13/22 09/13/22





  15:31 15:33 17:52


 


WBC   


 


RBC   


 


Hgb   


 


Hct   


 


MCV   


 


MCH   


 


MCHC   


 


RDW   


 


Plt Count   


 


Lymph % (Auto)   


 


Mono % (Auto)   


 


Lymph # (Auto)   


 


Mono # (Auto)   


 


Baso # (Auto)   


 


Seg Neutrophils %   


 


Seg Neuts % (Manual)   


 


Lymphocytes % (Manual)   


 


Monocytes % (Manual)   


 


Nucleated RBC %   


 


Seg Neutrophils #   


 


Seg Neutrophils # Man   


 


Lymphocytes # (Manual)   


 


Monocytes # (Manual)   


 


ABG pH   


 


ABG pO2   


 


ABG HCO3   


 


ABG O2 Saturation   


 


ABG Base Excess   


 


ABG Hemoglobin   


 


Oxyhemoglobin   


 


Sodium   


 


Potassium   


 


Chloride   


 


Carbon Dioxide   


 


BUN   


 


Creatinine   


 


Glucose   


 


POC Glucose  319 H  335 H  334 H


 


Hemoglobin A1c   


 


Calcium   


 


Phosphorus   


 


Magnesium   


 


Iron   


 


TIBC   


 


Ferritin   


 


AST   


 


Alkaline Phosphatase   


 


Total Creatine Kinase   


 


NT-Pro-B Natriuret Pep   


 


Total Protein   


 


Albumin   


 


LDL Cholesterol Direct   


 


HDL Cholesterol   


 


Vitamin B12   


 


Crossmatch   














  09/13/22 09/14/22 09/14/22





  20:20 06:04 06:04


 


WBC   27.9 H 


 


RBC   2.48 L 


 


Hgb   7.9 L 


 


Hct   24.7 L 


 


MCV   100 H 


 


MCH   


 


MCHC   


 


RDW   


 


Plt Count   51 L 


 


Lymph % (Auto)   


 


Mono % (Auto)   


 


Lymph # (Auto)   


 


Mono # (Auto)   


 


Baso # (Auto)   


 


Seg Neutrophils %   


 


Seg Neuts % (Manual)   82.0 H 


 


Lymphocytes % (Manual)   8.0 L 


 


Monocytes % (Manual)   10.0 H 


 


Nucleated RBC %   


 


Seg Neutrophils #   


 


Seg Neutrophils # Man   22.9 H 


 


Lymphocytes # (Manual)   


 


Monocytes # (Manual)   2.8 H 


 


ABG pH   


 


ABG pO2   


 


ABG HCO3   


 


ABG O2 Saturation   


 


ABG Base Excess   


 


ABG Hemoglobin   


 


Oxyhemoglobin   


 


Sodium   


 


Potassium   


 


Chloride   


 


Carbon Dioxide   


 


BUN   


 


Creatinine   


 


Glucose    64 L


 


POC Glucose  250 H  


 


Hemoglobin A1c   


 


Calcium    8.3 L


 


Phosphorus   


 


Magnesium   


 


Iron   


 


TIBC   


 


Ferritin   


 


AST   


 


Alkaline Phosphatase   


 


Total Creatine Kinase    483 H


 


NT-Pro-B Natriuret Pep   


 


Total Protein   


 


Albumin   


 


LDL Cholesterol Direct   


 


HDL Cholesterol   


 


Vitamin B12   


 


Crossmatch   














  09/14/22 09/14/22 09/14/22





  08:35 13:06 16:00


 


WBC   


 


RBC   


 


Hgb   


 


Hct   


 


MCV   


 


MCH   


 


MCHC   


 


RDW   


 


Plt Count   


 


Lymph % (Auto)   


 


Mono % (Auto)   


 


Lymph # (Auto)   


 


Mono # (Auto)   


 


Baso # (Auto)   


 


Seg Neutrophils %   


 


Seg Neuts % (Manual)   


 


Lymphocytes % (Manual)   


 


Monocytes % (Manual)   


 


Nucleated RBC %   


 


Seg Neutrophils #   


 


Seg Neutrophils # Man   


 


Lymphocytes # (Manual)   


 


Monocytes # (Manual)   


 


ABG pH   


 


ABG pO2   


 


ABG HCO3   


 


ABG O2 Saturation   


 


ABG Base Excess   


 


ABG Hemoglobin   


 


Oxyhemoglobin   


 


Sodium   


 


Potassium   


 


Chloride   


 


Carbon Dioxide   


 


BUN   


 


Creatinine   


 


Glucose   


 


POC Glucose  111 H  220 H  107 H


 


Hemoglobin A1c   


 


Calcium   


 


Phosphorus   


 


Magnesium   


 


Iron   


 


TIBC   


 


Ferritin   


 


AST   


 


Alkaline Phosphatase   


 


Total Creatine Kinase   


 


NT-Pro-B Natriuret Pep   


 


Total Protein   


 


Albumin   


 


LDL Cholesterol Direct   


 


HDL Cholesterol   


 


Vitamin B12   


 


Crossmatch   














  09/14/22 09/15/22 09/15/22





  22:37 04:51 05:15


 


WBC   


 


RBC   


 


Hgb   


 


Hct   


 


MCV   


 


MCH   


 


MCHC   


 


RDW   


 


Plt Count   


 


Lymph % (Auto)   


 


Mono % (Auto)   


 


Lymph # (Auto)   


 


Mono # (Auto)   


 


Baso # (Auto)   


 


Seg Neutrophils %   


 


Seg Neuts % (Manual)   


 


Lymphocytes % (Manual)   


 


Monocytes % (Manual)   


 


Nucleated RBC %   


 


Seg Neutrophils #   


 


Seg Neutrophils # Man   


 


Lymphocytes # (Manual)   


 


Monocytes # (Manual)   


 


ABG pH   


 


ABG pO2   


 


ABG HCO3   


 


ABG O2 Saturation   


 


ABG Base Excess   


 


ABG Hemoglobin   


 


Oxyhemoglobin   


 


Sodium   


 


Potassium   


 


Chloride   


 


Carbon Dioxide   


 


BUN   


 


Creatinine   


 


Glucose   


 


POC Glucose  109 H  189 H 


 


Hemoglobin A1c   


 


Calcium   


 


Phosphorus   


 


Magnesium   


 


Iron   


 


TIBC   


 


Ferritin   


 


AST   


 


Alkaline Phosphatase   


 


Total Creatine Kinase    304 H


 


NT-Pro-B Natriuret Pep   


 


Total Protein   


 


Albumin   


 


LDL Cholesterol Direct   


 


HDL Cholesterol   


 


Vitamin B12   


 


Crossmatch   














  09/15/22 09/15/22 09/15/22





  07:53 11:09 14:04


 


WBC   


 


RBC   


 


Hgb   


 


Hct   


 


MCV   


 


MCH   


 


MCHC   


 


RDW   


 


Plt Count   


 


Lymph % (Auto)   


 


Mono % (Auto)   


 


Lymph # (Auto)   


 


Mono # (Auto)   


 


Baso # (Auto)   


 


Seg Neutrophils %   


 


Seg Neuts % (Manual)   


 


Lymphocytes % (Manual)   


 


Monocytes % (Manual)   


 


Nucleated RBC %   


 


Seg Neutrophils #   


 


Seg Neutrophils # Man   


 


Lymphocytes # (Manual)   


 


Monocytes # (Manual)   


 


ABG pH   


 


ABG pO2   


 


ABG HCO3   


 


ABG O2 Saturation   


 


ABG Base Excess   


 


ABG Hemoglobin   


 


Oxyhemoglobin   


 


Sodium   


 


Potassium   


 


Chloride   


 


Carbon Dioxide   


 


BUN   


 


Creatinine   


 


Glucose   


 


POC Glucose  242 H  269 H  285 H


 


Hemoglobin A1c   


 


Calcium   


 


Phosphorus   


 


Magnesium   


 


Iron   


 


TIBC   


 


Ferritin   


 


AST   


 


Alkaline Phosphatase   


 


Total Creatine Kinase   


 


NT-Pro-B Natriuret Pep   


 


Total Protein   


 


Albumin   


 


LDL Cholesterol Direct   


 


HDL Cholesterol   


 


Vitamin B12   


 


Crossmatch   














  09/15/22 09/15/22 09/15/22





  17:11 17:50 21:18


 


WBC   


 


RBC   


 


Hgb   


 


Hct   


 


MCV   


 


MCH   


 


MCHC   


 


RDW   


 


Plt Count   


 


Lymph % (Auto)   


 


Mono % (Auto)   


 


Lymph # (Auto)   


 


Mono # (Auto)   


 


Baso # (Auto)   


 


Seg Neutrophils %   


 


Seg Neuts % (Manual)   


 


Lymphocytes % (Manual)   


 


Monocytes % (Manual)   


 


Nucleated RBC %   


 


Seg Neutrophils #   


 


Seg Neutrophils # Man   


 


Lymphocytes # (Manual)   


 


Monocytes # (Manual)   


 


ABG pH   


 


ABG pO2   


 


ABG HCO3   


 


ABG O2 Saturation   


 


ABG Base Excess   


 


ABG Hemoglobin   


 


Oxyhemoglobin   


 


Sodium   


 


Potassium   


 


Chloride   


 


Carbon Dioxide   


 


BUN   


 


Creatinine   


 


Glucose   


 


POC Glucose  301 H  299 H  331 H


 


Hemoglobin A1c   


 


Calcium   


 


Phosphorus   


 


Magnesium   


 


Iron   


 


TIBC   


 


Ferritin   


 


AST   


 


Alkaline Phosphatase   


 


Total Creatine Kinase   


 


NT-Pro-B Natriuret Pep   


 


Total Protein   


 


Albumin   


 


LDL Cholesterol Direct   


 


HDL Cholesterol   


 


Vitamin B12   


 


Crossmatch   














  09/16/22 09/16/22 09/16/22





  07:02 08:10 11:37


 


WBC   


 


RBC   


 


Hgb   


 


Hct   


 


MCV   


 


MCH   


 


MCHC   


 


RDW   


 


Plt Count   


 


Lymph % (Auto)   


 


Mono % (Auto)   


 


Lymph # (Auto)   


 


Mono # (Auto)   


 


Baso # (Auto)   


 


Seg Neutrophils %   


 


Seg Neuts % (Manual)   


 


Lymphocytes % (Manual)   


 


Monocytes % (Manual)   


 


Nucleated RBC %   


 


Seg Neutrophils #   


 


Seg Neutrophils # Man   


 


Lymphocytes # (Manual)   


 


Monocytes # (Manual)   


 


ABG pH   


 


ABG pO2   


 


ABG HCO3   


 


ABG O2 Saturation   


 


ABG Base Excess   


 


ABG Hemoglobin   


 


Oxyhemoglobin   


 


Sodium   


 


Potassium   


 


Chloride   


 


Carbon Dioxide   


 


BUN   


 


Creatinine   


 


Glucose   


 


POC Glucose  134 H  158 H  226 H


 


Hemoglobin A1c   


 


Calcium   


 


Phosphorus   


 


Magnesium   


 


Iron   


 


TIBC   


 


Ferritin   


 


AST   


 


Alkaline Phosphatase   


 


Total Creatine Kinase   


 


NT-Pro-B Natriuret Pep   


 


Total Protein   


 


Albumin   


 


LDL Cholesterol Direct   


 


HDL Cholesterol   


 


Vitamin B12   


 


Crossmatch   














  09/16/22 09/16/22 09/16/22





  17:12 20:17 23:34


 


WBC   


 


RBC   


 


Hgb   


 


Hct   


 


MCV   


 


MCH   


 


MCHC   


 


RDW   


 


Plt Count   


 


Lymph % (Auto)   


 


Mono % (Auto)   


 


Lymph # (Auto)   


 


Mono # (Auto)   


 


Baso # (Auto)   


 


Seg Neutrophils %   


 


Seg Neuts % (Manual)   


 


Lymphocytes % (Manual)   


 


Monocytes % (Manual)   


 


Nucleated RBC %   


 


Seg Neutrophils #   


 


Seg Neutrophils # Man   


 


Lymphocytes # (Manual)   


 


Monocytes # (Manual)   


 


ABG pH   


 


ABG pO2   


 


ABG HCO3   


 


ABG O2 Saturation   


 


ABG Base Excess   


 


ABG Hemoglobin   


 


Oxyhemoglobin   


 


Sodium   


 


Potassium   


 


Chloride   


 


Carbon Dioxide   


 


BUN   


 


Creatinine   


 


Glucose   


 


POC Glucose  122 H  235 H  283 H


 


Hemoglobin A1c   


 


Calcium   


 


Phosphorus   


 


Magnesium   


 


Iron   


 


TIBC   


 


Ferritin   


 


AST   


 


Alkaline Phosphatase   


 


Total Creatine Kinase   


 


NT-Pro-B Natriuret Pep   


 


Total Protein   


 


Albumin   


 


LDL Cholesterol Direct   


 


HDL Cholesterol   


 


Vitamin B12   


 


Crossmatch   














  09/17/22 09/17/22 09/17/22





  06:01 07:58 07:58


 


WBC    29.4 H


 


RBC    2.11 L


 


Hgb    6.7 L


 


Hct    21.0 L


 


MCV    100 H


 


MCH   


 


MCHC   


 


RDW   


 


Plt Count    34 L


 


Lymph % (Auto)   


 


Mono % (Auto)   


 


Lymph # (Auto)   


 


Mono # (Auto)   


 


Baso # (Auto)   


 


Seg Neutrophils %   


 


Seg Neuts % (Manual)    86.0 H


 


Lymphocytes % (Manual)    4.0 L


 


Monocytes % (Manual)    10.0 H


 


Nucleated RBC %   


 


Seg Neutrophils #   


 


Seg Neutrophils # Man    25.3 H


 


Lymphocytes # (Manual)   


 


Monocytes # (Manual)    2.9 H


 


ABG pH   


 


ABG pO2   


 


ABG HCO3   


 


ABG O2 Saturation   


 


ABG Base Excess   


 


ABG Hemoglobin   


 


Oxyhemoglobin   


 


Sodium   


 


Potassium   


 


Chloride   


 


Carbon Dioxide   


 


BUN   


 


Creatinine   


 


Glucose   


 


POC Glucose  175 H  


 


Hemoglobin A1c   


 


Calcium   


 


Phosphorus   


 


Magnesium   


 


Iron   


 


TIBC   


 


Ferritin   


 


AST   


 


Alkaline Phosphatase   


 


Total Creatine Kinase   


 


NT-Pro-B Natriuret Pep   


 


Total Protein   


 


Albumin   


 


LDL Cholesterol Direct   


 


HDL Cholesterol   


 


Vitamin B12   > 2000 H 


 


Crossmatch   














  09/17/22 09/17/22 09/17/22





  07:58 08:11 10:51


 


WBC   


 


RBC   


 


Hgb   


 


Hct   


 


MCV   


 


MCH   


 


MCHC   


 


RDW   


 


Plt Count   


 


Lymph % (Auto)   


 


Mono % (Auto)   


 


Lymph # (Auto)   


 


Mono # (Auto)   


 


Baso # (Auto)   


 


Seg Neutrophils %   


 


Seg Neuts % (Manual)   


 


Lymphocytes % (Manual)   


 


Monocytes % (Manual)   


 


Nucleated RBC %   


 


Seg Neutrophils #   


 


Seg Neutrophils # Man   


 


Lymphocytes # (Manual)   


 


Monocytes # (Manual)   


 


ABG pH   


 


ABG pO2   


 


ABG HCO3   


 


ABG O2 Saturation   


 


ABG Base Excess   


 


ABG Hemoglobin   


 


Oxyhemoglobin   


 


Sodium  136 L  


 


Potassium  2.9 L* D  


 


Chloride   


 


Carbon Dioxide   


 


BUN   


 


Creatinine   


 


Glucose  217 H  


 


POC Glucose   218 H  222 H


 


Hemoglobin A1c   


 


Calcium  7.5 L  


 


Phosphorus   


 


Magnesium   


 


Iron   


 


TIBC   


 


Ferritin   


 


AST   


 


Alkaline Phosphatase   


 


Total Creatine Kinase   


 


NT-Pro-B Natriuret Pep   


 


Total Protein   


 


Albumin   


 


LDL Cholesterol Direct   


 


HDL Cholesterol   


 


Vitamin B12   


 


Crossmatch   














  09/17/22 09/17/22 09/18/22





  15:25 20:32 08:22


 


WBC   


 


RBC   


 


Hgb   


 


Hct   


 


MCV   


 


MCH   


 


MCHC   


 


RDW   


 


Plt Count   


 


Lymph % (Auto)   


 


Mono % (Auto)   


 


Lymph # (Auto)   


 


Mono # (Auto)   


 


Baso # (Auto)   


 


Seg Neutrophils %   


 


Seg Neuts % (Manual)   


 


Lymphocytes % (Manual)   


 


Monocytes % (Manual)   


 


Nucleated RBC %   


 


Seg Neutrophils #   


 


Seg Neutrophils # Man   


 


Lymphocytes # (Manual)   


 


Monocytes # (Manual)   


 


ABG pH   


 


ABG pO2   


 


ABG HCO3   


 


ABG O2 Saturation   


 


ABG Base Excess   


 


ABG Hemoglobin   


 


Oxyhemoglobin   


 


Sodium   


 


Potassium    3.5 L D


 


Chloride   


 


Carbon Dioxide   


 


BUN   


 


Creatinine   


 


Glucose    30 L*


 


POC Glucose  174 H  159 H 


 


Hemoglobin A1c   


 


Calcium    7.2 L


 


Phosphorus   


 


Magnesium   


 


Iron   


 


TIBC   


 


Ferritin   


 


AST   


 


Alkaline Phosphatase   


 


Total Creatine Kinase   


 


NT-Pro-B Natriuret Pep   


 


Total Protein   


 


Albumin   


 


LDL Cholesterol Direct   


 


HDL Cholesterol   


 


Vitamin B12   


 


Crossmatch   














  09/18/22 09/18/22 09/18/22





  11:23 11:56 16:00


 


WBC   


 


RBC   


 


Hgb   


 


Hct   


 


MCV   


 


MCH   


 


MCHC   


 


RDW   


 


Plt Count   


 


Lymph % (Auto)   


 


Mono % (Auto)   


 


Lymph # (Auto)   


 


Mono # (Auto)   


 


Baso # (Auto)   


 


Seg Neutrophils %   


 


Seg Neuts % (Manual)   


 


Lymphocytes % (Manual)   


 


Monocytes % (Manual)   


 


Nucleated RBC %   


 


Seg Neutrophils #   


 


Seg Neutrophils # Man   


 


Lymphocytes # (Manual)   


 


Monocytes # (Manual)   


 


ABG pH   7.528 H 


 


ABG pO2   125.0 H 


 


ABG HCO3   


 


ABG O2 Saturation   


 


ABG Base Excess   


 


ABG Hemoglobin   6.9 L 


 


Oxyhemoglobin   


 


Sodium   


 


Potassium   


 


Chloride   


 


Carbon Dioxide   


 


BUN   


 


Creatinine   


 


Glucose   


 


POC Glucose  117 H   43 L


 


Hemoglobin A1c   


 


Calcium   


 


Phosphorus   


 


Magnesium   


 


Iron   


 


TIBC   


 


Ferritin   


 


AST   


 


Alkaline Phosphatase   


 


Total Creatine Kinase   


 


NT-Pro-B Natriuret Pep   


 


Total Protein   


 


Albumin   


 


LDL Cholesterol Direct   


 


HDL Cholesterol   


 


Vitamin B12   


 


Crossmatch   














  09/18/22 09/18/22 09/18/22





  16:19 16:54 20:44


 


WBC   


 


RBC   


 


Hgb   


 


Hct   


 


MCV   


 


MCH   


 


MCHC   


 


RDW   


 


Plt Count   


 


Lymph % (Auto)   


 


Mono % (Auto)   


 


Lymph # (Auto)   


 


Mono # (Auto)   


 


Baso # (Auto)   


 


Seg Neutrophils %   


 


Seg Neuts % (Manual)   


 


Lymphocytes % (Manual)   


 


Monocytes % (Manual)   


 


Nucleated RBC %   


 


Seg Neutrophils #   


 


Seg Neutrophils # Man   


 


Lymphocytes # (Manual)   


 


Monocytes # (Manual)   


 


ABG pH   


 


ABG pO2   


 


ABG HCO3   


 


ABG O2 Saturation   


 


ABG Base Excess   


 


ABG Hemoglobin   


 


Oxyhemoglobin   


 


Sodium   


 


Potassium   


 


Chloride   


 


Carbon Dioxide   


 


BUN   


 


Creatinine   


 


Glucose   


 


POC Glucose   150 H  188 H


 


Hemoglobin A1c   


 


Calcium   


 


Phosphorus   


 


Magnesium   


 


Iron   


 


TIBC   


 


Ferritin   


 


AST   


 


Alkaline Phosphatase   


 


Total Creatine Kinase   


 


NT-Pro-B Natriuret Pep   


 


Total Protein   


 


Albumin   


 


LDL Cholesterol Direct   


 


HDL Cholesterol   


 


Vitamin B12   


 


Crossmatch  See Detail  














  09/19/22 09/19/22 09/19/22





  07:37 08:31 08:31


 


WBC   33.1 H 


 


RBC   2.58 L 


 


Hgb   7.8 L 


 


Hct   25.1 L 


 


MCV   97 H 


 


MCH   


 


MCHC   31 L 


 


RDW   16.8 H 


 


Plt Count   14 L* 


 


Lymph % (Auto)   


 


Mono % (Auto)   


 


Lymph # (Auto)   1.1 L 


 


Mono # (Auto)   1.8 H 


 


Baso # (Auto)   0.2 H 


 


Seg Neutrophils %   89.3 H 


 


Seg Neuts % (Manual)   


 


Lymphocytes % (Manual)   


 


Monocytes % (Manual)   


 


Nucleated RBC %   


 


Seg Neutrophils #   28.0 H 


 


Seg Neutrophils # Man   


 


Lymphocytes # (Manual)   


 


Monocytes # (Manual)   


 


ABG pH   


 


ABG pO2   


 


ABG HCO3   


 


ABG O2 Saturation   


 


ABG Base Excess   


 


ABG Hemoglobin   


 


Oxyhemoglobin   


 


Sodium    136 L


 


Potassium   


 


Chloride   


 


Carbon Dioxide    19 L


 


BUN   


 


Creatinine   


 


Glucose    374 H


 


POC Glucose  283 H  


 


Hemoglobin A1c   


 


Calcium    7.3 L


 


Phosphorus   


 


Magnesium   


 


Iron   


 


TIBC   


 


Ferritin   


 


AST   


 


Alkaline Phosphatase   


 


Total Creatine Kinase   


 


NT-Pro-B Natriuret Pep   


 


Total Protein   


 


Albumin   


 


LDL Cholesterol Direct   


 


HDL Cholesterol   


 


Vitamin B12   


 


Crossmatch   














  09/19/22 09/19/22 09/19/22





  09:50 11:31 16:13


 


WBC   


 


RBC   


 


Hgb   


 


Hct   


 


MCV   


 


MCH   


 


MCHC   


 


RDW   


 


Plt Count   


 


Lymph % (Auto)   


 


Mono % (Auto)   


 


Lymph # (Auto)   


 


Mono # (Auto)   


 


Baso # (Auto)   


 


Seg Neutrophils %   


 


Seg Neuts % (Manual)   


 


Lymphocytes % (Manual)   


 


Monocytes % (Manual)   


 


Nucleated RBC %   


 


Seg Neutrophils #   


 


Seg Neutrophils # Man   


 


Lymphocytes # (Manual)   


 


Monocytes # (Manual)   


 


ABG pH   


 


ABG pO2   


 


ABG HCO3   


 


ABG O2 Saturation   


 


ABG Base Excess   


 


ABG Hemoglobin   


 


Oxyhemoglobin   


 


Sodium   


 


Potassium   


 


Chloride   


 


Carbon Dioxide   


 


BUN   


 


Creatinine   


 


Glucose   


 


POC Glucose   310 H  229 H


 


Hemoglobin A1c   


 


Calcium   


 


Phosphorus   


 


Magnesium   


 


Iron   


 


TIBC   


 


Ferritin   


 


AST   


 


Alkaline Phosphatase   


 


Total Creatine Kinase   


 


NT-Pro-B Natriuret Pep  3313 H  


 


Total Protein   


 


Albumin   


 


LDL Cholesterol Direct   


 


HDL Cholesterol   


 


Vitamin B12   


 


Crossmatch   














  09/19/22 09/20/22 09/20/22





  21:50 08:24 09:33


 


WBC   


 


RBC   


 


Hgb   


 


Hct   


 


MCV   


 


MCH   


 


MCHC   


 


RDW   


 


Plt Count   


 


Lymph % (Auto)   


 


Mono % (Auto)   


 


Lymph # (Auto)   


 


Mono # (Auto)   


 


Baso # (Auto)   


 


Seg Neutrophils %   


 


Seg Neuts % (Manual)   


 


Lymphocytes % (Manual)   


 


Monocytes % (Manual)   


 


Nucleated RBC %   


 


Seg Neutrophils #   


 


Seg Neutrophils # Man   


 


Lymphocytes # (Manual)   


 


Monocytes # (Manual)   


 


ABG pH   


 


ABG pO2   


 


ABG HCO3   


 


ABG O2 Saturation   


 


ABG Base Excess   


 


ABG Hemoglobin   


 


Oxyhemoglobin   


 


Sodium   


 


Potassium   


 


Chloride   


 


Carbon Dioxide   


 


BUN   


 


Creatinine   


 


Glucose   


 


POC Glucose  181 H  200 H  230 H


 


Hemoglobin A1c   


 


Calcium   


 


Phosphorus   


 


Magnesium   


 


Iron   


 


TIBC   


 


Ferritin   


 


AST   


 


Alkaline Phosphatase   


 


Total Creatine Kinase   


 


NT-Pro-B Natriuret Pep   


 


Total Protein   


 


Albumin   


 


LDL Cholesterol Direct   


 


HDL Cholesterol   


 


Vitamin B12   


 


Crossmatch   














  09/20/22 09/20/22 09/20/22





  12:02 15:03 15:03


 


WBC   35.1 H 


 


RBC   2.32 L 


 


Hgb   7.1 L 


 


Hct   22.4 L 


 


MCV   97 H 


 


MCH   


 


MCHC   


 


RDW   17.3 H 


 


Plt Count   30 L D 


 


Lymph % (Auto)   


 


Mono % (Auto)   


 


Lymph # (Auto)   


 


Mono # (Auto)   


 


Baso # (Auto)   


 


Seg Neutrophils %   


 


Seg Neuts % (Manual)   92.0 H 


 


Lymphocytes % (Manual)   2.0 L 


 


Monocytes % (Manual)   


 


Nucleated RBC %   2.0 H 


 


Seg Neutrophils #   


 


Seg Neutrophils # Man   32.3 H 


 


Lymphocytes # (Manual)   0.7 L 


 


Monocytes # (Manual)   1.1 H 


 


ABG pH   


 


ABG pO2   


 


ABG HCO3   


 


ABG O2 Saturation   


 


ABG Base Excess   


 


ABG Hemoglobin   


 


Oxyhemoglobin   


 


Sodium   


 


Potassium    3.2 L


 


Chloride   


 


Carbon Dioxide    20 L


 


BUN    30 H


 


Creatinine    1.5 H


 


Glucose    377 H


 


POC Glucose  325 H  


 


Hemoglobin A1c   


 


Calcium    7.4 L


 


Phosphorus   


 


Magnesium   


 


Iron   


 


TIBC   


 


Ferritin   


 


AST   


 


Alkaline Phosphatase    180 H


 


Total Creatine Kinase   


 


NT-Pro-B Natriuret Pep   


 


Total Protein    5.3 L


 


Albumin    2.3 L


 


LDL Cholesterol Direct   


 


HDL Cholesterol   


 


Vitamin B12   


 


Crossmatch   














  09/20/22 09/20/22 09/21/22





  16:20 21:23 08:07


 


WBC    30.3 H


 


RBC    2.41 L


 


Hgb    7.5 L


 


Hct    22.7 L


 


MCV   


 


MCH   


 


MCHC   


 


RDW    17.3 H


 


Plt Count    22 L


 


Lymph % (Auto)    8.6 L


 


Mono % (Auto)   


 


Lymph # (Auto)   


 


Mono # (Auto)   


 


Baso # (Auto)    0.2 H


 


Seg Neutrophils %    87.6 H


 


Seg Neuts % (Manual)   


 


Lymphocytes % (Manual)   


 


Monocytes % (Manual)   


 


Nucleated RBC %   


 


Seg Neutrophils #    26.5 H


 


Seg Neutrophils # Man   


 


Lymphocytes # (Manual)   


 


Monocytes # (Manual)   


 


ABG pH   


 


ABG pO2   


 


ABG HCO3   


 


ABG O2 Saturation   


 


ABG Base Excess   


 


ABG Hemoglobin   


 


Oxyhemoglobin   


 


Sodium   


 


Potassium   


 


Chloride   


 


Carbon Dioxide   


 


BUN   


 


Creatinine   


 


Glucose   


 


POC Glucose  356 H  305 H 


 


Hemoglobin A1c   


 


Calcium   


 


Phosphorus   


 


Magnesium   


 


Iron   


 


TIBC   


 


Ferritin   


 


AST   


 


Alkaline Phosphatase   


 


Total Creatine Kinase   


 


NT-Pro-B Natriuret Pep   


 


Total Protein   


 


Albumin   


 


LDL Cholesterol Direct   


 


HDL Cholesterol   


 


Vitamin B12   


 


Crossmatch   














  09/21/22 09/21/22 09/21/22





  08:07 08:20 11:10


 


WBC   


 


RBC   


 


Hgb   


 


Hct   


 


MCV   


 


MCH   


 


MCHC   


 


RDW   


 


Plt Count   


 


Lymph % (Auto)   


 


Mono % (Auto)   


 


Lymph # (Auto)   


 


Mono # (Auto)   


 


Baso # (Auto)   


 


Seg Neutrophils %   


 


Seg Neuts % (Manual)   


 


Lymphocytes % (Manual)   


 


Monocytes % (Manual)   


 


Nucleated RBC %   


 


Seg Neutrophils #   


 


Seg Neutrophils # Man   


 


Lymphocytes # (Manual)   


 


Monocytes # (Manual)   


 


ABG pH    7.270 L


 


ABG pO2   46.4 L  54.4 L


 


ABG HCO3    19.5 L


 


ABG O2 Saturation   78.6 L  78.1 L


 


ABG Base Excess    -6.9 L


 


ABG Hemoglobin   7.7 L  7.6 L


 


Oxyhemoglobin   76.7 L  76.3 L


 


Sodium   


 


Potassium  3.1 L  


 


Chloride   


 


Carbon Dioxide   


 


BUN  35 H  


 


Creatinine  1.7 H  


 


Glucose  165 H  


 


POC Glucose   


 


Hemoglobin A1c   


 


Calcium  7.5 L  


 


Phosphorus   


 


Magnesium   


 


Iron   


 


TIBC   


 


Ferritin   


 


AST   


 


Alkaline Phosphatase  191 H  


 


Total Creatine Kinase   


 


NT-Pro-B Natriuret Pep   


 


Total Protein  5.3 L  


 


Albumin  2.0 L  


 


LDL Cholesterol Direct   


 


HDL Cholesterol   


 


Vitamin B12   


 


Crossmatch   














  09/21/22 09/21/22 09/21/22





  11:12 13:30 16:47


 


WBC   


 


RBC   


 


Hgb   


 


Hct   


 


MCV   


 


MCH   


 


MCHC   


 


RDW   


 


Plt Count   


 


Lymph % (Auto)   


 


Mono % (Auto)   


 


Lymph # (Auto)   


 


Mono # (Auto)   


 


Baso # (Auto)   


 


Seg Neutrophils %   


 


Seg Neuts % (Manual)   


 


Lymphocytes % (Manual)   


 


Monocytes % (Manual)   


 


Nucleated RBC %   


 


Seg Neutrophils #   


 


Seg Neutrophils # Man   


 


Lymphocytes # (Manual)   


 


Monocytes # (Manual)   


 


ABG pH   7.292 L 


 


ABG pO2   


 


ABG HCO3   19.8 L 


 


ABG O2 Saturation   


 


ABG Base Excess   -6.2 L 


 


ABG Hemoglobin   7.0 L 


 


Oxyhemoglobin   93.8 L 


 


Sodium   


 


Potassium   


 


Chloride   


 


Carbon Dioxide   


 


BUN   


 


Creatinine   


 


Glucose   


 


POC Glucose  193 H   212 H


 


Hemoglobin A1c   


 


Calcium   


 


Phosphorus   


 


Magnesium   


 


Iron   


 


TIBC   


 


Ferritin   


 


AST   


 


Alkaline Phosphatase   


 


Total Creatine Kinase   


 


NT-Pro-B Natriuret Pep   


 


Total Protein   


 


Albumin   


 


LDL Cholesterol Direct   


 


HDL Cholesterol   


 


Vitamin B12   


 


Crossmatch   














  09/21/22 09/22/22 09/22/22





  22:35 03:35 05:00


 


WBC    22.4 H


 


RBC    2.16 L


 


Hgb    6.5 L


 


Hct    21.2 L


 


MCV    99 H


 


MCH   


 


MCHC    30 L


 


RDW    17.8 H


 


Plt Count    44 L D


 


Lymph % (Auto)   


 


Mono % (Auto)   


 


Lymph # (Auto)   


 


Mono # (Auto)   


 


Baso # (Auto)   


 


Seg Neutrophils %   


 


Seg Neuts % (Manual)   


 


Lymphocytes % (Manual)   


 


Monocytes % (Manual)   


 


Nucleated RBC %   


 


Seg Neutrophils #   


 


Seg Neutrophils # Man   


 


Lymphocytes # (Manual)   


 


Monocytes # (Manual)   


 


ABG pH   7.198 L* 


 


ABG pO2   115.9 H 


 


ABG HCO3   19.7 L 


 


ABG O2 Saturation   


 


ABG Base Excess   -7.7 L 


 


ABG Hemoglobin   6.0 L 


 


Oxyhemoglobin   


 


Sodium   


 


Potassium   


 


Chloride   


 


Carbon Dioxide   


 


BUN   


 


Creatinine   


 


Glucose   


 


POC Glucose  260 H  


 


Hemoglobin A1c   


 


Calcium   


 


Phosphorus   


 


Magnesium   


 


Iron   


 


TIBC   


 


Ferritin   


 


AST   


 


Alkaline Phosphatase   


 


Total Creatine Kinase   


 


NT-Pro-B Natriuret Pep   


 


Total Protein   


 


Albumin   


 


LDL Cholesterol Direct   


 


HDL Cholesterol   


 


Vitamin B12   


 


Crossmatch   














  09/22/22





  05:00


 


WBC 


 


RBC 


 


Hgb 


 


Hct 


 


MCV 


 


MCH 


 


MCHC 


 


RDW 


 


Plt Count 


 


Lymph % (Auto) 


 


Mono % (Auto) 


 


Lymph # (Auto) 


 


Mono # (Auto) 


 


Baso # (Auto) 


 


Seg Neutrophils % 


 


Seg Neuts % (Manual) 


 


Lymphocytes % (Manual) 


 


Monocytes % (Manual) 


 


Nucleated RBC % 


 


Seg Neutrophils # 


 


Seg Neutrophils # Man 


 


Lymphocytes # (Manual) 


 


Monocytes # (Manual) 


 


ABG pH 


 


ABG pO2 


 


ABG HCO3 


 


ABG O2 Saturation 


 


ABG Base Excess 


 


ABG Hemoglobin 


 


Oxyhemoglobin 


 


Sodium 


 


Potassium 


 


Chloride 


 


Carbon Dioxide  18 L


 


BUN  50 H


 


Creatinine  2.7 H D


 


Glucose  177 H


 


POC Glucose 


 


Hemoglobin A1c 


 


Calcium  7.5 L


 


Phosphorus  7.90 H D


 


Magnesium  2.40 H


 


Iron 


 


TIBC 


 


Ferritin 


 


AST 


 


Alkaline Phosphatase 


 


Total Creatine Kinase 


 


NT-Pro-B Natriuret Pep 


 


Total Protein 


 


Albumin 


 


LDL Cholesterol Direct 


 


HDL Cholesterol 


 


Vitamin B12 


 


Crossmatch

## 2022-09-23 VITALS — SYSTOLIC BLOOD PRESSURE: 95 MMHG | DIASTOLIC BLOOD PRESSURE: 34 MMHG

## 2022-09-23 PROCEDURE — 5A12012 PERFORMANCE OF CARDIAC OUTPUT, SINGLE, MANUAL: ICD-10-PCS | Performed by: STUDENT IN AN ORGANIZED HEALTH CARE EDUCATION/TRAINING PROGRAM

## 2022-09-23 RX ADMIN — VANCOMYCIN HYDROCHLORIDE SCH MG: KIT at 00:00

## 2022-09-23 RX ADMIN — METOPROLOL TARTRATE SCH: 5 INJECTION INTRAVENOUS at 00:00

## 2022-09-23 NOTE — EVENT NOTE
Date: 09/23/22





CODE AKI is called.  Patient is found asystole.  CPR was given as per ACLS 

protocol for 12 minutes.  5 epinephrine, 2 bicarb, 1 calcium and 1 D50 was 

given.  Patient regained ROSC.  We are going to put the patient on epinephrine 

drip.  Prognosis is poor.  Family is notified

## 2022-09-23 NOTE — DEATH NOTE
Death Note


Date of Death: 22


Time of Death: :34


Time Pronounced: :34





- Preliminary Cause of Death (problem)


(1) Cardiac arrest


Preliminary cause of death





CODE BLUE was called.  Patient was found asystole.  CPR was given as per ACLS 

protocol.  3 epinephrine bicarb was given.  There is a second code but patient 

failed to regain ROSC.  Patient  at 1:30 AM on 2022 due to 

cardiopulmonary arrest, acute hypoxic respiratory failure.  Hypertension.  JASMEET. 

Prognosis is poor.  Talk with the  Gabriel Cotter and notify about the 

patient death.  She will talk to the brother Abdiaziz Cotter.








(2) Acute and chronic respiratory failure with hypoxia


Preliminary cause of death














(3) Foot osteomyelitis, left


Qualifiers: 


   Osteomyelitis type: other acute   Qualified Code(s): M86.172 - Other acute 

osteomyelitis, left ankle and foot   


Preliminary cause of death

## 2022-09-23 NOTE — DEATH SUMMARY
Death Summary





- Providers


Date of service: 22


Consults: 


                                        





09/10/22 05:19


Consult to Physician [CONS] Stat 


   Comment: 


   Consulting Provider: TRUMAN COLVIN


   Physician Instructions: 


   Reason For Exam: Osteomyelitis Left Foot





09/10/22 05:24


Consult to Physician [CONS] Stat 


   Comment: done


   Consulting Provider: TONYA GUALLPA


   Physician Instructions: 


   Reason For Exam: osteomyelitis foot





09/10/22 14:33


Consult to Physician [CONS] Stat 


   Comment: 


   Consulting Provider: TYRONE PARKINSON


   Physician Instructions: 


   Reason For Exam: linezolid / septic 3rd toe





22 11:12


Midline [Consult to PICC Line RN] [CONS] Routine 


   Reason For Exam: Difficulty with recent line infiltration


   Type Line:: Midline





22 14:01


Physical Therapy Evaluation and Treat [CONS] Routine 


   Comment: 


   Reason For Exam: s/p R great toe amp





22 09:40


Consult to Physician [CONS] Routine 


   Comment: 


   Consulting Provider: NORTH RAY


   Physician Instructions: 


   Reason For Exam: thrombocytopenia





22 09:52


Consult to Physician [CONS] Urgent 


   Comment: 


   Consulting Provider: ELLIS BARTLETT


   Physician Instructions: 


   Reason For Exam: resp failure /intubated/CC /pulm consult





22 09:26


Consult to Dietitian/Nutrition [CONS] Routine 


   Physician Instructions: 


   Reason For Exam: 


   Reason for Consult: Write/Manage Tube Feeding





22 13:43


Consult to PICC Line RN [CONS] Routine 


   Reason For Exam: long term access


   Type Line:: PICC





22 07:55


Consult to Physician [CONS] Routine 


   Comment: 


   Consulting Provider: IZABELA MARTINEZ


   Physician Instructions: 


   Reason For Exam: larissa











Attending: 


SYEDA WRIGHT MD








- Death summary


Date of admission: 


09/10/22 08:30





Date of Death: 22


Significant findings: 


This is a 67-year-old male with DM, PVD s/p right lower extremity bypass for 

occluded popliteal to dorsalis pedis, s/p transmetatarsal amputation of right 

foot, s/p left lower extremity SFA/popliteal stenting, CKD who presented to 

emergency department on 9/10 with complaints of infected diabetic foot.  

Recommend the emergency department revealed white gangrene of left third distal 

phalanx and middle phalanx, diabetic foot infection with ulceration of the left 

third digit, osteomyelitis of left foot, hyperglycemia, pseudohyponatremia, 

metabolic acidosis, leukocytosis, thrombocytopenia and acute kidney injury.  

Patient was admitted to the hospitalist service with consults to vascular and 

general surgery. Patient was transferred to ICU on  as he was hypoxic 

requiring supplemental oxygen 15 L/high flow, BiPAP as needed suddenly went into

 acute respiratory failure, and unresponsiveness, code met was called, Upon 

responding to code met, patient was severely hypoxemic on BiPAP respiratory 

therapist recommended intubation and ventilatory support. On auscultation 

bilateral basal crackles, advised 40 mg of IV Lasix x1 dose and on arrival to 

ICU, patient was more alert, slightly improved, intubation held and pulmonary 

critical recommended BiPAP, Set of labs, chest x-ray, EKG and ABG was requested.

 On  patient was responsive and ABG was ran which showed hypoxemia and 

patient was intubated, deonna and PICC placed, hypotensive for which 1 L bolus wa

s given with minimal response and patient was ultimately placed on vasopressors.

  On , Patient was  noted with sluggish pupils, no cough/gag and stat CT 

ordered which showed no acute events, noted to be anemic and received 2 units 

PRBC, nephrology consulted due to worsening renal function and CCM updated 

family at bedside. During night shift, patient suffered cardiac arrest and ROSC 

was not achieved. Patient  and time of death was called by night 

hospitalist, Dr. Barnes. According to noted, family was notified.





Acute metabolic encephalopathy


Hypotension, h/o hypertension, CAD


PVD s/p revascularization followed by amputation


Acute hypoxemic respiratory failure, ARDS


Moderate protein calorie malnutrition


Acute on possible chronic kidney disease


Sepsis secondary to osteomyelitis of left foot, wet gangrene of the left third 

distal and middle phalanx, diabetic foot infection with abscess of left third 

digit, r/o c diff


h/o DM


Microcytic anemia secondary to liver dysfunction and sepsis, thrombocytopenia 

secondary to sepsis/diabetic foot infection/osteomyelitis, Leukocytosis

## 2022-09-26 LAB
ALBUMIN SERPL-MCNC: 1.5 G/DL (ref 3.8–4.8)
GAMMA GLOB SERPL ELPH-MCNC: 1.3 G/DL (ref 0.8–1.7)

## 2024-12-03 NOTE — CONSULTATION
History of Present Illness


Consult date: 09/20/22


Requesting physician: AMY J KOCHERLA


History of present illness: 





66 y/o male transferred from the floor on this am with acute respiratory failure

and altered mental status.  Patient is more awake now so will try bipap therapy.

 Admitted several days ago secondary to gangrene.  Had toes amputated and was on

the floor on abx therapy.  Subsequently developed acute respiratory failure and 

bilateral alveolar infiltrates concerning for pulmonary edema.  





Past History


Past Medical History: diabetes, PVD (s/p RLE bypass 10+ years ago, occluded 

(pop-dp) ; s/p LLE SFA/pop stenting at outside facility years ago), renal 

failure (CKD)


Past Surgical History: Other (PVD surgery; transmetatarsal amputation of right 

foot)


Social history: no significant social history


Family history: no significant family history





Medications and Allergies


                                    Allergies











Allergy/AdvReac Type Severity Reaction Status Date / Time


 


No Known Allergies Allergy   Verified 09/13/22 13:17











                                Home Medications











 Medication  Instructions  Recorded  Confirmed  Last Taken  Type


 


Tamsulosin [Flomax] 0.4 mg PO QDAY 09/10/22 09/13/22 Unknown History


 


traZODone [Desyrel] 100 mg PO QHS 09/10/22 09/13/22 Unknown History


 


AtorvaSTATin [Lipitor] 40 mg PO QHS 09/13/22 09/13/22 Unknown History


 


Chlorthalidone [Thalitone] 25 mg PO QDAY 09/13/22 09/13/22 Unknown History


 


Insulin Glargine,Hum.rec.anlog 60 units SQ QDAY 09/13/22 09/13/22 Unknown 

History





[Lantus Solostar]     


 


Lisinopril [Zestril TAB] 30 mg PO QDAY 09/13/22 09/13/22 Unknown History


 


Lispro Insulin [HumaLOG] 10 units SQ TIDWM 09/13/22 09/13/22 Unknown History


 


Metoprolol [Lopressor] 100 mg PO BID 09/13/22 09/13/22 Unknown History


 


Multivitamin 1 each PO QDAY 09/13/22 09/13/22 Unknown History


 


Sertraline [Zoloft] 100 mg PO QDAY 09/13/22 09/13/22 Unknown History


 


Timolol Maleate/Pf [Timolol 1 drop OU BID 09/13/22 09/13/22 Unknown History





Maleate 0.5% Eye Drop]     


 


cilostazoL [Pletal] 100 mg PO BID 09/13/22 09/13/22 Unknown History











Active Meds: 


Active Medications





Acetaminophen (Acetaminophen 325 Mg Tab)  650 mg PO Q4H PRN


   PRN Reason: Pain MILD(1-3)/Fever >100.5/HA


   Last Admin: 09/18/22 20:37 Dose:  650 mg


   


Atorvastatin Calcium (Atorvastatin 40 Mg Tab)  40 mg PO DAILY ELVIE


   Last Admin: 09/19/22 09:35 Dose:  40 mg


   


Cilostazol (Cilostazol 100 Mg Tab)  100 mg PO BID ELVIE


   Last Admin: 09/19/22 21:15 Dose:  100 mg


   


Clopidogrel Bisulfate (Clopidogrel 75 Mg Tab)  75 mg PO QDAY ELVIE


   Last Admin: 09/19/22 09:35 Dose:  75 mg


   


Dextrose (Dextrose 50% In Water (25gm) 50 Ml Syringe)  50 ml IV Q30MIN PRN; 

Protocol


   PRN Reason: Hypoglycemia


   Last Admin: 09/18/22 16:13 Dose:  25 ml


   


Sodium Chloride (Nacl 0.9% 1000 Ml)  1,000 mls @ 100 mls/hr IV AS DIRECT ELVIE


   Last Admin: 09/19/22 13:57 Dose:  100 mls/hr


   


Daptomycin 500 mg/ Sodium (Chloride)  100 mls @ 200 mls/hr IV Q24H ELVIE; Protocol


   Last Admin: 09/19/22 21:15 Dose:  200 mls/hr


   


Metronidazole (Flagyl 500 Mg/100 Ml)  500 mg in 100 mls @ 100 mls/hr IV Q8H ELVIE;

 Protocol


   Last Admin: 09/20/22 04:07 Dose:  100 mls/hr


   


Cefepime HCl (Cefepime/Ns 1 Gm/100 Ml)  1 gm in 100 mls @ 200 mls/hr IV Q8H ELVIE;

 Protocol


   Last Admin: 09/20/22 05:57 Dose:  200 mls/hr


   


Insulin Human Isoph/Insulin Regular (Insulin Nph/Regular 70/30 Inj)  18 unit 

SUB-Q BIDDIAB ELVIE


   Last Admin: 09/19/22 16:27 Dose:  Not Given


   


Insulin Human Regular (Insulin Regular, Human 100 Units/1 Ml)  0 units SUB-Q 

ACHS ELVIE; Protocol


   Last Admin: 09/20/22 08:30 Dose:  Not Given


   


Metoclopramide HCl (Metoclopramide 10 Mg/2 Ml Inj)  10 mg IV Q6H PRN


   PRN Reason: Nausea And Vomiting


   Last Admin: 09/18/22 20:54 Dose:  10 mg


   


Metoprolol Tartrate (Metoprolol Tartrate 100 Mg Tab)  100 mg PO BID [facility]


   Last Admin: 09/19/22 21:15 Dose:  100 mg


   


Metoprolol Tartrate (Metoprolol Tartrate 5 Mg/5 Ml Inj)  5 mg IV Q6HR [facility]


Metoprolol Tartrate (Metoprolol Tartrate 5 Mg/5 Ml Inj)  2.5 mg IV ONCE ONE


   Stop: 09/20/22 10:48


Morphine Sulfate (Morphine 4 Mg/1 Ml Inj)  2 mg IV Q4H PRN


   PRN Reason: Pain , Severe (7-10)


   Last Admin: 09/18/22 21:53 Dose:  2 mg


   


Nifedipine (Nifedipine Xl 30 Mg Tab)  30 mg PO QDAY [facility]


   Last Admin: 09/19/22 09:35 Dose:  30 mg


   


Ondansetron HCl (Ondansetron 4 Mg/2 Ml Inj)  4 mg IV Q8H PRN


   PRN Reason: Nausea And Vomiting


   Last Admin: 09/18/22 19:18 Dose:  4 mg


   


Oxycodone/Acetaminophen (Oxycodone /Acetaminophen 5-325mg Tab)  1 tab PO Q6H PRN


   PRN Reason: Pain, Moderate (4-6)


   Last Admin: 09/19/22 16:24 Dose:  1 tab


   


Phenol (Phenol 1.4% 177 Ml Bottle)  1 spray MM PRN PRN


   PRN Reason: Sore Throat


   Last Admin: 09/20/22 01:43 Dose:  1 spray


   


Sodium Chloride (Sodium Chloride 0.9% 10 Ml Flush Syringe)  10 ml IV BID [facility]


   Last Admin: 09/20/22 08:26 Dose:  Not Given


   


Sodium Chloride (Sodium Chloride 0.9% 10 Ml Flush Syringe)  10 ml IV PRN PRN


   PRN Reason: LINE FLUSH


Sodium Hypochlorite (Sodium Hypochlorite, Dakin's 1/2 Strength (0.25%) 473 Ml 

Topical Soln)  1 applic TP BID [facility]


   Last Admin: 09/20/22 08:26 Dose:  Not Given


   











Physical Examination


Vital signs: 


                                   Vital Signs











Temp Pulse Resp BP Pulse Ox


 


 99.0 F   109 H  16   124/49   99 


 


 09/09/22 21:14  09/09/22 21:14  09/09/22 21:14  09/09/22 21:14  09/09/22 21:14














Results





- Laboratory Findings


CBC and BMP: 


                                 09/21/22 08:07





                                 09/21/22 08:07


ABG











ABG pH  7.528 pH Units (7.350-7.450)  H  09/18/22  11:56    


 


ABG pCO2  30.0 mm Hg  09/18/22  11:56    


 


ABG pO2  125.0 mm Hg (80.0-90.0)  H  09/18/22  11:56    


 


ABG O2 Saturation  98.7 % (95.0-99.0)   09/18/22  11:56    





PT/INR, D-dimer











PT  14.5 Sec. (12.2-14.9)   09/12/22  Unknown


 


INR  0.99  (0.87-1.13)   09/12/22  Unknown








Abnormal lab findings: 


                                  Abnormal Labs











  09/10/22 09/10/22 09/10/22





  02:26 02:26 08:41


 


WBC  27.0 H  


 


RBC  2.52 L  


 


Hgb  8.2 L  


 


Hct  25.5 L  


 


MCV  101 H  


 


MCH  33 H  


 


MCHC   


 


RDW  13.1 L  


 


Plt Count  31 L  


 


Mono % (Auto)   


 


Lymph # (Auto)   


 


Mono # (Auto)   


 


Baso # (Auto)   


 


Seg Neutrophils %   


 


Seg Neuts % (Manual)  88.0 H  


 


Lymphocytes % (Manual)  7.0 L  


 


Monocytes % (Manual)   


 


Seg Neutrophils #   


 


Seg Neutrophils # Man  23.8 H  


 


Monocytes # (Manual)   


 


ABG pH   


 


ABG pO2   


 


ABG Hemoglobin   


 


Sodium   129 L 


 


Potassium   


 


Chloride   91.5 L 


 


Carbon Dioxide   16 L 


 


BUN   57 H 


 


Creatinine   1.7 H 


 


Glucose   509 H* 


 


POC Glucose   


 


Hemoglobin A1c    6.5 H


 


Calcium   


 


Iron   


 


TIBC   


 


Ferritin   


 


AST   41 H 


 


Total Creatine Kinase   


 


NT-Pro-B Natriuret Pep   


 


Albumin   3.6 L 


 


LDL Cholesterol Direct   


 


HDL Cholesterol   


 


Vitamin B12   


 


Crossmatch   














  09/10/22 09/10/22 09/10/22





  08:41 09:19 11:22


 


WBC   


 


RBC   


 


Hgb   


 


Hct   


 


MCV   


 


MCH   


 


MCHC   


 


RDW   


 


Plt Count   


 


Mono % (Auto)   


 


Lymph # (Auto)   


 


Mono # (Auto)   


 


Baso # (Auto)   


 


Seg Neutrophils %   


 


Seg Neuts % (Manual)   


 


Lymphocytes % (Manual)   


 


Monocytes % (Manual)   


 


Seg Neutrophils #   


 


Seg Neutrophils # Man   


 


Monocytes # (Manual)   


 


ABG pH   


 


ABG pO2   


 


ABG Hemoglobin   


 


Sodium   


 


Potassium   


 


Chloride   


 


Carbon Dioxide   


 


BUN   


 


Creatinine   


 


Glucose   


 


POC Glucose   394 H  343 H


 


Hemoglobin A1c   


 


Calcium   


 


Iron   


 


TIBC   


 


Ferritin   


 


AST   


 


Total Creatine Kinase   


 


NT-Pro-B Natriuret Pep   


 


Albumin   


 


LDL Cholesterol Direct  46 L  


 


HDL Cholesterol  37 L  


 


Vitamin B12   


 


Crossmatch   














  09/10/22 09/10/22 09/10/22





  15:52 17:44 18:16


 


WBC   


 


RBC   


 


Hgb   


 


Hct   


 


MCV   


 


MCH   


 


MCHC   


 


RDW   


 


Plt Count   


 


Mono % (Auto)   


 


Lymph # (Auto)   


 


Mono # (Auto)   


 


Baso # (Auto)   


 


Seg Neutrophils %   


 


Seg Neuts % (Manual)   


 


Lymphocytes % (Manual)   


 


Monocytes % (Manual)   


 


Seg Neutrophils #   


 


Seg Neutrophils # Man   


 


Monocytes # (Manual)   


 


ABG pH   


 


ABG pO2   


 


ABG Hemoglobin   


 


Sodium   


 


Potassium   


 


Chloride   


 


Carbon Dioxide   


 


BUN   


 


Creatinine   


 


Glucose   


 


POC Glucose  515 H  558 H  464 H


 


Hemoglobin A1c   


 


Calcium   


 


Iron   


 


TIBC   


 


Ferritin   


 


AST   


 


Total Creatine Kinase   


 


NT-Pro-B Natriuret Pep   


 


Albumin   


 


LDL Cholesterol Direct   


 


HDL Cholesterol   


 


Vitamin B12   


 


Crossmatch   














  09/10/22 09/11/22 09/11/22





  20:35 05:35 05:35


 


WBC   21.2 H 


 


RBC   2.49 L 


 


Hgb   7.9 L 


 


Hct   25.3 L 


 


MCV   102 H 


 


MCH   


 


MCHC   31 L 


 


RDW   13.1 L 


 


Plt Count   27 L 


 


Mono % (Auto)   


 


Lymph # (Auto)   


 


Mono # (Auto)   


 


Baso # (Auto)   


 


Seg Neutrophils %   


 


Seg Neuts % (Manual)   81.5 H 


 


Lymphocytes % (Manual)   12.0 L 


 


Monocytes % (Manual)   


 


Seg Neutrophils #   


 


Seg Neutrophils # Man   17.3 H 


 


Monocytes # (Manual)   1.0 H 


 


ABG pH   


 


ABG pO2   


 


ABG Hemoglobin   


 


Sodium    134 L


 


Potassium   


 


Chloride   


 


Carbon Dioxide    21 L


 


BUN    29 H


 


Creatinine   


 


Glucose    179 H


 


POC Glucose  359 H  


 


Hemoglobin A1c   


 


Calcium   


 


Iron    16 L


 


TIBC    133 L


 


Ferritin   


 


AST   


 


Total Creatine Kinase   


 


NT-Pro-B Natriuret Pep   


 


Albumin   


 


LDL Cholesterol Direct   


 


HDL Cholesterol   


 


Vitamin B12   


 


Crossmatch   














  09/11/22 09/11/22 09/11/22





  05:35 05:50 07:39


 


WBC   


 


RBC   


 


Hgb   


 


Hct   


 


MCV   


 


MCH   


 


MCHC   


 


RDW   


 


Plt Count   


 


Mono % (Auto)   


 


Lymph # (Auto)   


 


Mono # (Auto)   


 


Baso # (Auto)   


 


Seg Neutrophils %   


 


Seg Neuts % (Manual)   


 


Lymphocytes % (Manual)   


 


Monocytes % (Manual)   


 


Seg Neutrophils #   


 


Seg Neutrophils # Man   


 


Monocytes # (Manual)   


 


ABG pH   


 


ABG pO2   


 


ABG Hemoglobin   


 


Sodium   


 


Potassium   


 


Chloride   


 


Carbon Dioxide   


 


BUN   


 


Creatinine   


 


Glucose   


 


POC Glucose   164 H  180 H


 


Hemoglobin A1c   


 


Calcium   


 


Iron   


 


TIBC   


 


Ferritin  2888.0 H  


 


AST   


 


Total Creatine Kinase   


 


NT-Pro-B Natriuret Pep   


 


Albumin   


 


LDL Cholesterol Direct   


 


HDL Cholesterol   


 


Vitamin B12   


 


Crossmatch   














  09/11/22 09/11/22 09/12/22





  11:39 16:33 06:01


 


WBC   


 


RBC   


 


Hgb   


 


Hct   


 


MCV   


 


MCH   


 


MCHC   


 


RDW   


 


Plt Count   


 


Mono % (Auto)   


 


Lymph # (Auto)   


 


Mono # (Auto)   


 


Baso # (Auto)   


 


Seg Neutrophils %   


 


Seg Neuts % (Manual)   


 


Lymphocytes % (Manual)   


 


Monocytes % (Manual)   


 


Seg Neutrophils #   


 


Seg Neutrophils # Man   


 


Monocytes # (Manual)   


 


ABG pH   


 


ABG pO2   


 


ABG Hemoglobin   


 


Sodium   


 


Potassium   


 


Chloride   


 


Carbon Dioxide   


 


BUN   


 


Creatinine   


 


Glucose   


 


POC Glucose  202 H  182 H  150 H


 


Hemoglobin A1c   


 


Calcium   


 


Iron   


 


TIBC   


 


Ferritin   


 


AST   


 


Total Creatine Kinase   


 


NT-Pro-B Natriuret Pep   


 


Albumin   


 


LDL Cholesterol Direct   


 


HDL Cholesterol   


 


Vitamin B12   


 


Crossmatch   














  09/12/22 09/12/22 09/12/22





  08:50 12:09 13:02


 


WBC   22.5 H 


 


RBC   2.70 L 


 


Hgb   8.8 L 


 


Hct   27.2 L 


 


MCV   101 H 


 


MCH   33 H 


 


MCHC   


 


RDW   


 


Plt Count   44 L 


 


Mono % (Auto)   


 


Lymph # (Auto)   


 


Mono # (Auto)   


 


Baso # (Auto)   


 


Seg Neutrophils %   


 


Seg Neuts % (Manual)   77.0 H 


 


Lymphocytes % (Manual)   11.0 L 


 


Monocytes % (Manual)   12.0 H 


 


Seg Neutrophils #   


 


Seg Neutrophils # Man   17.3 H 


 


Monocytes # (Manual)   2.7 H 


 


ABG pH   


 


ABG pO2   


 


ABG Hemoglobin   


 


Sodium   


 


Potassium   


 


Chloride   


 


Carbon Dioxide   


 


BUN   


 


Creatinine   


 


Glucose   


 


POC Glucose  191 H   228 H


 


Hemoglobin A1c   


 


Calcium   


 


Iron   


 


TIBC   


 


Ferritin   


 


AST   


 


Total Creatine Kinase   


 


NT-Pro-B Natriuret Pep   


 


Albumin   


 


LDL Cholesterol Direct   


 


HDL Cholesterol   


 


Vitamin B12   


 


Crossmatch   














  09/12/22 09/12/22 09/12/22





  20:41 Unknown Unknown


 


WBC   17.9 H 


 


RBC   2.71 L 


 


Hgb   8.6 L 


 


Hct   27.3 L 


 


MCV   101 H 


 


MCH   


 


MCHC   31 L 


 


RDW   13.1 L 


 


Plt Count   47 L 


 


Mono % (Auto)   11.9 H 


 


Lymph # (Auto)   


 


Mono # (Auto)   2.1 H 


 


Baso # (Auto)   


 


Seg Neutrophils %   70.7 H 


 


Seg Neuts % (Manual)   


 


Lymphocytes % (Manual)   


 


Monocytes % (Manual)   


 


Seg Neutrophils #   12.6 H 


 


Seg Neutrophils # Man   


 


Monocytes # (Manual)   


 


ABG pH   


 


ABG pO2   


 


ABG Hemoglobin   


 


Sodium    136 L


 


Potassium   


 


Chloride   


 


Carbon Dioxide   


 


BUN   


 


Creatinine   


 


Glucose    174 H


 


POC Glucose  380 H  


 


Hemoglobin A1c   


 


Calcium   


 


Iron   


 


TIBC   


 


Ferritin   


 


AST   


 


Total Creatine Kinase   


 


NT-Pro-B Natriuret Pep   


 


Albumin   


 


LDL Cholesterol Direct   


 


HDL Cholesterol   


 


Vitamin B12   


 


Crossmatch   














  09/13/22 09/13/22 09/13/22





  05:10 05:10 05:49


 


WBC  25.0 H  


 


RBC  2.51 L  


 


Hgb  8.1 L  


 


Hct  24.9 L  


 


MCV  99 H  


 


MCH   


 


MCHC   


 


RDW   


 


Plt Count  41 L  


 


Mono % (Auto)   


 


Lymph # (Auto)   


 


Mono # (Auto)   


 


Baso # (Auto)   


 


Seg Neutrophils %   


 


Seg Neuts % (Manual)  91.0 H  


 


Lymphocytes % (Manual)  6.0 L  


 


Monocytes % (Manual)   


 


Seg Neutrophils #   


 


Seg Neutrophils # Man  22.8 H  


 


Monocytes # (Manual)   


 


ABG pH   


 


ABG pO2   


 


ABG Hemoglobin   


 


Sodium   


 


Potassium   


 


Chloride   


 


Carbon Dioxide   


 


BUN   


 


Creatinine   


 


Glucose   197 H 


 


POC Glucose    230 H


 


Hemoglobin A1c   


 


Calcium   


 


Iron   


 


TIBC   


 


Ferritin   


 


AST   


 


Total Creatine Kinase   816 H 


 


NT-Pro-B Natriuret Pep   


 


Albumin   


 


LDL Cholesterol Direct   


 


HDL Cholesterol   


 


Vitamin B12   


 


Crossmatch   














  09/13/22 09/13/22 09/13/22





  15:31 15:33 17:52


 


WBC   


 


RBC   


 


Hgb   


 


Hct   


 


MCV   


 


MCH   


 


MCHC   


 


RDW   


 


Plt Count   


 


Mono % (Auto)   


 


Lymph # (Auto)   


 


Mono # (Auto)   


 


Baso # (Auto)   


 


Seg Neutrophils %   


 


Seg Neuts % (Manual)   


 


Lymphocytes % (Manual)   


 


Monocytes % (Manual)   


 


Seg Neutrophils #   


 


Seg Neutrophils # Man   


 


Monocytes # (Manual)   


 


ABG pH   


 


ABG pO2   


 


ABG Hemoglobin   


 


Sodium   


 


Potassium   


 


Chloride   


 


Carbon Dioxide   


 


BUN   


 


Creatinine   


 


Glucose   


 


POC Glucose  319 H  335 H  334 H


 


Hemoglobin A1c   


 


Calcium   


 


Iron   


 


TIBC   


 


Ferritin   


 


AST   


 


Total Creatine Kinase   


 


NT-Pro-B Natriuret Pep   


 


Albumin   


 


LDL Cholesterol Direct   


 


HDL Cholesterol   


 


Vitamin B12   


 


Crossmatch   














  09/13/22 09/14/22 09/14/22





  20:20 06:04 06:04


 


WBC   27.9 H 


 


RBC   2.48 L 


 


Hgb   7.9 L 


 


Hct   24.7 L 


 


MCV   100 H 


 


MCH   


 


MCHC   


 


RDW   


 


Plt Count   51 L 


 


Mono % (Auto)   


 


Lymph # (Auto)   


 


Mono # (Auto)   


 


Baso # (Auto)   


 


Seg Neutrophils %   


 


Seg Neuts % (Manual)   82.0 H 


 


Lymphocytes % (Manual)   8.0 L 


 


Monocytes % (Manual)   10.0 H 


 


Seg Neutrophils #   


 


Seg Neutrophils # Man   22.9 H 


 


Monocytes # (Manual)   2.8 H 


 


ABG pH   


 


ABG pO2   


 


ABG Hemoglobin   


 


Sodium   


 


Potassium   


 


Chloride   


 


Carbon Dioxide   


 


BUN   


 


Creatinine   


 


Glucose    64 L


 


POC Glucose  250 H  


 


Hemoglobin A1c   


 


Calcium    8.3 L


 


Iron   


 


TIBC   


 


Ferritin   


 


AST   


 


Total Creatine Kinase    483 H


 


NT-Pro-B Natriuret Pep   


 


Albumin   


 


LDL Cholesterol Direct   


 


HDL Cholesterol   


 


Vitamin B12   


 


Crossmatch   














  09/14/22 09/14/22 09/14/22





  08:35 13:06 16:00


 


WBC   


 


RBC   


 


Hgb   


 


Hct   


 


MCV   


 


MCH   


 


MCHC   


 


RDW   


 


Plt Count   


 


Mono % (Auto)   


 


Lymph # (Auto)   


 


Mono # (Auto)   


 


Baso # (Auto)   


 


Seg Neutrophils %   


 


Seg Neuts % (Manual)   


 


Lymphocytes % (Manual)   


 


Monocytes % (Manual)   


 


Seg Neutrophils #   


 


Seg Neutrophils # Man   


 


Monocytes # (Manual)   


 


ABG pH   


 


ABG pO2   


 


ABG Hemoglobin   


 


Sodium   


 


Potassium   


 


Chloride   


 


Carbon Dioxide   


 


BUN   


 


Creatinine   


 


Glucose   


 


POC Glucose  111 H  220 H  107 H


 


Hemoglobin A1c   


 


Calcium   


 


Iron   


 


TIBC   


 


Ferritin   


 


AST   


 


Total Creatine Kinase   


 


NT-Pro-B Natriuret Pep   


 


Albumin   


 


LDL Cholesterol Direct   


 


HDL Cholesterol   


 


Vitamin B12   


 


Crossmatch   














  09/14/22 09/15/22 09/15/22





  22:37 04:51 05:15


 


WBC   


 


RBC   


 


Hgb   


 


Hct   


 


MCV   


 


MCH   


 


MCHC   


 


RDW   


 


Plt Count   


 


Mono % (Auto)   


 


Lymph # (Auto)   


 


Mono # (Auto)   


 


Baso # (Auto)   


 


Seg Neutrophils %   


 


Seg Neuts % (Manual)   


 


Lymphocytes % (Manual)   


 


Monocytes % (Manual)   


 


Seg Neutrophils #   


 


Seg Neutrophils # Man   


 


Monocytes # (Manual)   


 


ABG pH   


 


ABG pO2   


 


ABG Hemoglobin   


 


Sodium   


 


Potassium   


 


Chloride   


 


Carbon Dioxide   


 


BUN   


 


Creatinine   


 


Glucose   


 


POC Glucose  109 H  189 H 


 


Hemoglobin A1c   


 


Calcium   


 


Iron   


 


TIBC   


 


Ferritin   


 


AST   


 


Total Creatine Kinase    304 H


 


NT-Pro-B Natriuret Pep   


 


Albumin   


 


LDL Cholesterol Direct   


 


HDL Cholesterol   


 


Vitamin B12   


 


Crossmatch   














  09/15/22 09/15/22 09/15/22





  07:53 11:09 14:04


 


WBC   


 


RBC   


 


Hgb   


 


Hct   


 


MCV   


 


MCH   


 


MCHC   


 


RDW   


 


Plt Count   


 


Mono % (Auto)   


 


Lymph # (Auto)   


 


Mono # (Auto)   


 


Baso # (Auto)   


 


Seg Neutrophils %   


 


Seg Neuts % (Manual)   


 


Lymphocytes % (Manual)   


 


Monocytes % (Manual)   


 


Seg Neutrophils #   


 


Seg Neutrophils # Man   


 


Monocytes # (Manual)   


 


ABG pH   


 


ABG pO2   


 


ABG Hemoglobin   


 


Sodium   


 


Potassium   


 


Chloride   


 


Carbon Dioxide   


 


BUN   


 


Creatinine   


 


Glucose   


 


POC Glucose  242 H  269 H  285 H


 


Hemoglobin A1c   


 


Calcium   


 


Iron   


 


TIBC   


 


Ferritin   


 


AST   


 


Total Creatine Kinase   


 


NT-Pro-B Natriuret Pep   


 


Albumin   


 


LDL Cholesterol Direct   


 


HDL Cholesterol   


 


Vitamin B12   


 


Crossmatch   














  09/15/22 09/15/22 09/15/22





  17:11 17:50 21:18


 


WBC   


 


RBC   


 


Hgb   


 


Hct   


 


MCV   


 


MCH   


 


MCHC   


 


RDW   


 


Plt Count   


 


Mono % (Auto)   


 


Lymph # (Auto)   


 


Mono # (Auto)   


 


Baso # (Auto)   


 


Seg Neutrophils %   


 


Seg Neuts % (Manual)   


 


Lymphocytes % (Manual)   


 


Monocytes % (Manual)   


 


Seg Neutrophils #   


 


Seg Neutrophils # Man   


 


Monocytes # (Manual)   


 


ABG pH   


 


ABG pO2   


 


ABG Hemoglobin   


 


Sodium   


 


Potassium   


 


Chloride   


 


Carbon Dioxide   


 


BUN   


 


Creatinine   


 


Glucose   


 


POC Glucose  301 H  299 H  331 H


 


Hemoglobin A1c   


 


Calcium   


 


Iron   


 


TIBC   


 


Ferritin   


 


AST   


 


Total Creatine Kinase   


 


NT-Pro-B Natriuret Pep   


 


Albumin   


 


LDL Cholesterol Direct   


 


HDL Cholesterol   


 


Vitamin B12   


 


Crossmatch   














  09/16/22 09/16/22 09/16/22





  07:02 08:10 11:37


 


WBC   


 


RBC   


 


Hgb   


 


Hct   


 


MCV   


 


MCH   


 


MCHC   


 


RDW   


 


Plt Count   


 


Mono % (Auto)   


 


Lymph # (Auto)   


 


Mono # (Auto)   


 


Baso # (Auto)   


 


Seg Neutrophils %   


 


Seg Neuts % (Manual)   


 


Lymphocytes % (Manual)   


 


Monocytes % (Manual)   


 


Seg Neutrophils #   


 


Seg Neutrophils # Man   


 


Monocytes # (Manual)   


 


ABG pH   


 


ABG pO2   


 


ABG Hemoglobin   


 


Sodium   


 


Potassium   


 


Chloride   


 


Carbon Dioxide   


 


BUN   


 


Creatinine   


 


Glucose   


 


POC Glucose  134 H  158 H  226 H


 


Hemoglobin A1c   


 


Calcium   


 


Iron   


 


TIBC   


 


Ferritin   


 


AST   


 


Total Creatine Kinase   


 


NT-Pro-B Natriuret Pep   


 


Albumin   


 


LDL Cholesterol Direct   


 


HDL Cholesterol   


 


Vitamin B12   


 


Crossmatch   














  09/16/22 09/16/22 09/16/22





  17:12 20:17 23:34


 


WBC   


 


RBC   


 


Hgb   


 


Hct   


 


MCV   


 


MCH   


 


MCHC   


 


RDW   


 


Plt Count   


 


Mono % (Auto)   


 


Lymph # (Auto)   


 


Mono # (Auto)   


 


Baso # (Auto)   


 


Seg Neutrophils %   


 


Seg Neuts % (Manual)   


 


Lymphocytes % (Manual)   


 


Monocytes % (Manual)   


 


Seg Neutrophils #   


 


Seg Neutrophils # Man   


 


Monocytes # (Manual)   


 


ABG pH   


 


ABG pO2   


 


ABG Hemoglobin   


 


Sodium   


 


Potassium   


 


Chloride   


 


Carbon Dioxide   


 


BUN   


 


Creatinine   


 


Glucose   


 


POC Glucose  122 H  235 H  283 H


 


Hemoglobin A1c   


 


Calcium   


 


Iron   


 


TIBC   


 


Ferritin   


 


AST   


 


Total Creatine Kinase   


 


NT-Pro-B Natriuret Pep   


 


Albumin   


 


LDL Cholesterol Direct   


 


HDL Cholesterol   


 


Vitamin B12   


 


Crossmatch   














  09/17/22 09/17/22 09/17/22





  06:01 07:58 07:58


 


WBC    29.4 H


 


RBC    2.11 L


 


Hgb    6.7 L


 


Hct    21.0 L


 


MCV    100 H


 


MCH   


 


MCHC   


 


RDW   


 


Plt Count    34 L


 


Mono % (Auto)   


 


Lymph # (Auto)   


 


Mono # (Auto)   


 


Baso # (Auto)   


 


Seg Neutrophils %   


 


Seg Neuts % (Manual)    86.0 H


 


Lymphocytes % (Manual)    4.0 L


 


Monocytes % (Manual)    10.0 H


 


Seg Neutrophils #   


 


Seg Neutrophils # Man    25.3 H


 


Monocytes # (Manual)    2.9 H


 


ABG pH   


 


ABG pO2   


 


ABG Hemoglobin   


 


Sodium   


 


Potassium   


 


Chloride   


 


Carbon Dioxide   


 


BUN   


 


Creatinine   


 


Glucose   


 


POC Glucose  175 H  


 


Hemoglobin A1c   


 


Calcium   


 


Iron   


 


TIBC   


 


Ferritin   


 


AST   


 


Total Creatine Kinase   


 


NT-Pro-B Natriuret Pep   


 


Albumin   


 


LDL Cholesterol Direct   


 


HDL Cholesterol   


 


Vitamin B12   > 2000 H 


 


Crossmatch   














  09/17/22 09/17/22 09/17/22





  07:58 08:11 10:51


 


WBC   


 


RBC   


 


Hgb   


 


Hct   


 


MCV   


 


MCH   


 


MCHC   


 


RDW   


 


Plt Count   


 


Mono % (Auto)   


 


Lymph # (Auto)   


 


Mono # (Auto)   


 


Baso # (Auto)   


 


Seg Neutrophils %   


 


Seg Neuts % (Manual)   


 


Lymphocytes % (Manual)   


 


Monocytes % (Manual)   


 


Seg Neutrophils #   


 


Seg Neutrophils # Man   


 


Monocytes # (Manual)   


 


ABG pH   


 


ABG pO2   


 


ABG Hemoglobin   


 


Sodium  136 L  


 


Potassium  2.9 L* D  


 


Chloride   


 


Carbon Dioxide   


 


BUN   


 


Creatinine   


 


Glucose  217 H  


 


POC Glucose   218 H  222 H


 


Hemoglobin A1c   


 


Calcium  7.5 L  


 


Iron   


 


TIBC   


 


Ferritin   


 


AST   


 


Total Creatine Kinase   


 


NT-Pro-B Natriuret Pep   


 


Albumin   


 


LDL Cholesterol Direct   


 


HDL Cholesterol   


 


Vitamin B12   


 


Crossmatch   














  09/17/22 09/17/22 09/18/22





  15:25 20:32 08:22


 


WBC   


 


RBC   


 


Hgb   


 


Hct   


 


MCV   


 


MCH   


 


MCHC   


 


RDW   


 


Plt Count   


 


Mono % (Auto)   


 


Lymph # (Auto)   


 


Mono # (Auto)   


 


Baso # (Auto)   


 


Seg Neutrophils %   


 


Seg Neuts % (Manual)   


 


Lymphocytes % (Manual)   


 


Monocytes % (Manual)   


 


Seg Neutrophils #   


 


Seg Neutrophils # Man   


 


Monocytes # (Manual)   


 


ABG pH   


 


ABG pO2   


 


ABG Hemoglobin   


 


Sodium   


 


Potassium    3.5 L D


 


Chloride   


 


Carbon Dioxide   


 


BUN   


 


Creatinine   


 


Glucose    30 L*


 


POC Glucose  174 H  159 H 


 


Hemoglobin A1c   


 


Calcium    7.2 L


 


Iron   


 


TIBC   


 


Ferritin   


 


AST   


 


Total Creatine Kinase   


 


NT-Pro-B Natriuret Pep   


 


Albumin   


 


LDL Cholesterol Direct   


 


HDL Cholesterol   


 


Vitamin B12   


 


Crossmatch   














  09/18/22 09/18/22 09/18/22





  11:23 11:56 16:00


 


WBC   


 


RBC   


 


Hgb   


 


Hct   


 


MCV   


 


MCH   


 


MCHC   


 


RDW   


 


Plt Count   


 


Mono % (Auto)   


 


Lymph # (Auto)   


 


Mono # (Auto)   


 


Baso # (Auto)   


 


Seg Neutrophils %   


 


Seg Neuts % (Manual)   


 


Lymphocytes % (Manual)   


 


Monocytes % (Manual)   


 


Seg Neutrophils #   


 


Seg Neutrophils # Man   


 


Monocytes # (Manual)   


 


ABG pH   7.528 H 


 


ABG pO2   125.0 H 


 


ABG Hemoglobin   6.9 L 


 


Sodium   


 


Potassium   


 


Chloride   


 


Carbon Dioxide   


 


BUN   


 


Creatinine   


 


Glucose   


 


POC Glucose  117 H   43 L


 


Hemoglobin A1c   


 


Calcium   


 


Iron   


 


TIBC   


 


Ferritin   


 


AST   


 


Total Creatine Kinase   


 


NT-Pro-B Natriuret Pep   


 


Albumin   


 


LDL Cholesterol Direct   


 


HDL Cholesterol   


 


Vitamin B12   


 


Crossmatch   














  09/18/22 09/18/22 09/18/22





  16:19 16:54 20:44


 


WBC   


 


RBC   


 


Hgb   


 


Hct   


 


MCV   


 


MCH   


 


MCHC   


 


RDW   


 


Plt Count   


 


Mono % (Auto)   


 


Lymph # (Auto)   


 


Mono # (Auto)   


 


Baso # (Auto)   


 


Seg Neutrophils %   


 


Seg Neuts % (Manual)   


 


Lymphocytes % (Manual)   


 


Monocytes % (Manual)   


 


Seg Neutrophils #   


 


Seg Neutrophils # Man   


 


Monocytes # (Manual)   


 


ABG pH   


 


ABG pO2   


 


ABG Hemoglobin   


 


Sodium   


 


Potassium   


 


Chloride   


 


Carbon Dioxide   


 


BUN   


 


Creatinine   


 


Glucose   


 


POC Glucose   150 H  188 H


 


Hemoglobin A1c   


 


Calcium   


 


Iron   


 


TIBC   


 


Ferritin   


 


AST   


 


Total Creatine Kinase   


 


NT-Pro-B Natriuret Pep   


 


Albumin   


 


LDL Cholesterol Direct   


 


HDL Cholesterol   


 


Vitamin B12   


 


Crossmatch  See Detail  














  09/19/22 09/19/22 09/19/22





  07:37 08:31 08:31


 


WBC   33.1 H 


 


RBC   2.58 L 


 


Hgb   7.8 L 


 


Hct   25.1 L 


 


MCV   97 H 


 


MCH   


 


MCHC   31 L 


 


RDW   16.8 H 


 


Plt Count   14 L* 


 


Mono % (Auto)   


 


Lymph # (Auto)   1.1 L 


 


Mono # (Auto)   1.8 H 


 


Baso # (Auto)   0.2 H 


 


Seg Neutrophils %   89.3 H 


 


Seg Neuts % (Manual)   


 


Lymphocytes % (Manual)   


 


Monocytes % (Manual)   


 


Seg Neutrophils #   28.0 H 


 


Seg Neutrophils # Man   


 


Monocytes # (Manual)   


 


ABG pH   


 


ABG pO2   


 


ABG Hemoglobin   


 


Sodium    136 L


 


Potassium   


 


Chloride   


 


Carbon Dioxide    19 L


 


BUN   


 


Creatinine   


 


Glucose    374 H


 


POC Glucose  283 H  


 


Hemoglobin A1c   


 


Calcium    7.3 L


 


Iron   


 


TIBC   


 


Ferritin   


 


AST   


 


Total Creatine Kinase   


 


NT-Pro-B Natriuret Pep   


 


Albumin   


 


LDL Cholesterol Direct   


 


HDL Cholesterol   


 


Vitamin B12   


 


Crossmatch   














  09/19/22 09/19/22 09/19/22





  09:50 11:31 16:13


 


WBC   


 


RBC   


 


Hgb   


 


Hct   


 


MCV   


 


MCH   


 


MCHC   


 


RDW   


 


Plt Count   


 


Mono % (Auto)   


 


Lymph # (Auto)   


 


Mono # (Auto)   


 


Baso # (Auto)   


 


Seg Neutrophils %   


 


Seg Neuts % (Manual)   


 


Lymphocytes % (Manual)   


 


Monocytes % (Manual)   


 


Seg Neutrophils #   


 


Seg Neutrophils # Man   


 


Monocytes # (Manual)   


 


ABG pH   


 


ABG pO2   


 


ABG Hemoglobin   


 


Sodium   


 


Potassium   


 


Chloride   


 


Carbon Dioxide   


 


BUN   


 


Creatinine   


 


Glucose   


 


POC Glucose   310 H  229 H


 


Hemoglobin A1c   


 


Calcium   


 


Iron   


 


TIBC   


 


Ferritin   


 


AST   


 


Total Creatine Kinase   


 


NT-Pro-B Natriuret Pep  3313 H  


 


Albumin   


 


LDL Cholesterol Direct   


 


HDL Cholesterol   


 


Vitamin B12   


 


Crossmatch   














  09/19/22 09/20/22





  21:50 08:24


 


WBC  


 


RBC  


 


Hgb  


 


Hct  


 


MCV  


 


MCH  


 


MCHC  


 


RDW  


 


Plt Count  


 


Mono % (Auto)  


 


Lymph # (Auto)  


 


Mono # (Auto)  


 


Baso # (Auto)  


 


Seg Neutrophils %  


 


Seg Neuts % (Manual)  


 


Lymphocytes % (Manual)  


 


Monocytes % (Manual)  


 


Seg Neutrophils #  


 


Seg Neutrophils # Man  


 


Monocytes # (Manual)  


 


ABG pH  


 


ABG pO2  


 


ABG Hemoglobin  


 


Sodium  


 


Potassium  


 


Chloride  


 


Carbon Dioxide  


 


BUN  


 


Creatinine  


 


Glucose  


 


POC Glucose  181 H  200 H


 


Hemoglobin A1c  


 


Calcium  


 


Iron  


 


TIBC  


 


Ferritin  


 


AST  


 


Total Creatine Kinase  


 


NT-Pro-B Natriuret Pep  


 


Albumin  


 


LDL Cholesterol Direct  


 


HDL Cholesterol  


 


Vitamin B12  


 


Crossmatch  














- Diagnostic Findings


Chest x-ray: image reviewed





Assessment and Plan





66 y/o male with gangrene of foot, osteomyelitis s/p amputation now with acute 

respiratory failure concern for volume overload





1.  Agree with trial of lasix


2.  Given that mental state has improved, will hold on intubation for now and 

attempt PPV with bipap


3.  Per patient has severe anxiety, but want to wait given his recent 

improvement in mental state


4.  Abx per ID


5.  PRognosis is guarded.





CCT 31 minutes. show